# Patient Record
Sex: FEMALE | Race: WHITE | NOT HISPANIC OR LATINO | Employment: FULL TIME | URBAN - METROPOLITAN AREA
[De-identification: names, ages, dates, MRNs, and addresses within clinical notes are randomized per-mention and may not be internally consistent; named-entity substitution may affect disease eponyms.]

---

## 2020-06-12 ENCOUNTER — OFFICE VISIT (OUTPATIENT)
Dept: URGENT CARE | Facility: CLINIC | Age: 50
End: 2020-06-12
Payer: COMMERCIAL

## 2020-06-12 VITALS
HEART RATE: 63 BPM | OXYGEN SATURATION: 100 % | WEIGHT: 145 LBS | TEMPERATURE: 98 F | HEIGHT: 68 IN | DIASTOLIC BLOOD PRESSURE: 91 MMHG | BODY MASS INDEX: 21.98 KG/M2 | RESPIRATION RATE: 19 BRPM | SYSTOLIC BLOOD PRESSURE: 133 MMHG

## 2020-06-12 DIAGNOSIS — Z20.828 EXPOSURE TO VIRAL DISEASE: Primary | ICD-10-CM

## 2020-06-12 DIAGNOSIS — Z76.89 ENCOUNTER TO ESTABLISH CARE: ICD-10-CM

## 2020-06-12 PROCEDURE — 99201 PR OFFICE/OUTPT VISIT,NEW,LEVL I: ICD-10-PCS | Mod: S$GLB,,, | Performed by: NURSE PRACTITIONER

## 2020-06-12 PROCEDURE — 99201 PR OFFICE/OUTPT VISIT,NEW,LEVL I: CPT | Mod: S$GLB,,, | Performed by: NURSE PRACTITIONER

## 2020-06-12 PROCEDURE — U0003 INFECTIOUS AGENT DETECTION BY NUCLEIC ACID (DNA OR RNA); SEVERE ACUTE RESPIRATORY SYNDROME CORONAVIRUS 2 (SARS-COV-2) (CORONAVIRUS DISEASE [COVID-19]), AMPLIFIED PROBE TECHNIQUE, MAKING USE OF HIGH THROUGHPUT TECHNOLOGIES AS DESCRIBED BY CMS-2020-01-R: HCPCS

## 2020-06-12 RX ORDER — ESCITALOPRAM OXALATE 10 MG/1
TABLET ORAL
COMMUNITY
Start: 2020-05-28 | End: 2020-07-24 | Stop reason: SDUPTHER

## 2020-06-12 NOTE — PROGRESS NOTES
"Subjective:       Patient ID: Naima Jeffries is a 49 y.o. female.    Vitals:  height is 5' 7.5" (1.715 m) and weight is 65.8 kg (145 lb). Her temperature is 98.1 °F (36.7 °C). Her blood pressure is 133/91 (abnormal) and her pulse is 63. Her respiration is 19 and oxygen saturation is 100%.     Chief Complaint: COVID-19 Concerns    Pt states she was exposed on Wednesday by a friend. She denies having any symptoms. Pt saw them 5 days ago.    No fever or chills. No cough or SOB. No GI related symptoms, including, N/v/D or constipation. No anosmia or ageusia.    Patient requests referral to Family Provider to establish PCP.      Constitution: Negative for chills, fatigue and fever.   HENT: Negative for congestion and sore throat.    Neck: Negative for painful lymph nodes.   Cardiovascular: Negative for chest pain and leg swelling.   Eyes: Negative for double vision and blurred vision.   Respiratory: Negative for cough and shortness of breath.    Gastrointestinal: Negative for nausea, vomiting and diarrhea.   Genitourinary: Negative for dysuria, frequency, urgency and history of kidney stones.   Musculoskeletal: Negative for joint pain, joint swelling, muscle cramps and muscle ache.   Skin: Negative for color change, pale, rash and bruising.   Allergic/Immunologic: Negative for seasonal allergies.   Neurological: Negative for dizziness, history of vertigo, light-headedness, passing out and headaches.   Hematologic/Lymphatic: Negative for swollen lymph nodes.   Psychiatric/Behavioral: Negative for nervous/anxious, sleep disturbance and depression. The patient is not nervous/anxious.        Objective:      Physical Exam   Constitutional: She is oriented to person, place, and time. Vital signs are normal. No distress.   HENT:   Right Ear: Hearing normal.   Left Ear: Hearing normal.   Neck: Trachea normal and phonation normal.   Cardiovascular: Normal rate.   Pulmonary/Chest: Effort normal and breath sounds normal. She has no " decreased breath sounds. She has no wheezes.   No decreased breath sounds or audible wheezes on phone exam   Neurological: She is alert and oriented to person, place, and time. She is not disoriented. Gait normal.   Psychiatric: She has a normal mood and affect. Her speech is normal and behavior is normal. Judgment and thought content normal. Cognition and memory are normal.     Physical exam not performed due to potential for viral transmission during pandemic.  Patient seen telephonically.        Assessment:       1. Exposure to viral disease    2. Encounter to establish care        Plan:         Exposure to viral disease    Encounter to establish care  -     Ambulatory referral/consult to Family Practice      Patient Instructions   Instructions for Patients with Confirmed or Suspected COVID-19    If you are awaiting your test result, you will either be called or it will be released to the patient portal.  If you have any questions about your test, please visit [email or web address] or call our COVID-19 information line at [phone].      Preventing the Spread of Coronavirus Disease 2019 (COVID-19) in Homes and Residential Communities -- Patients     Prevention steps for people with confirmed or suspected COVID-19 (including persons under investigation) who do not need to be hospitalized and people with confirmed COVID-19 who were hospitalized and determined to be medically stable to go home.    Stay home except to get medical care.    Separate yourself from other people and animals in your home.    Call ahead before visiting your doctor.    Wear a face mask.    Cover your coughs and sneezes.    Clean your hands often.    Avoid sharing personal household items.    Clean all high-touch surfaces every day.    Monitor your symptoms. Seek prompt medical attention if your illness is worsening (e.g., difficulty breathing). Before seeking care, call your healthcare provider.    If you have a  medical emergency and must call 911, notify the dispatcher that you have or are being evaluated for COVID-19. If possible, put on a face mask before emergency medical services arrive.    Use the following symptom-based strategy to return to normal activity following a suspected or confirmed case of COVID-19. Continue isolation until:   o At least 3 days (72 hours) have passed since recovery defined as resolution of fever without the use of fever-reducing medications and improvement in respiratory symptoms (e.g. cough, shortness of breath), and   o At least 10 days have passed since symptoms first appeared.     Precautions for household members, intimate partners and caregivers in a non-healthcare setting of a patient with symptomatic laboratory-confirmed COVID-19 or a patient under investigation.     Household members, intimate partners and caregivers in a non-healthcare setting may have close contact with a person with symptomatic, laboratory-confirmed COVID-19 or a person under investigation. Close contacts should monitor their health; they should call their healthcare provider right away if they develop symptoms suggestive of COVID-19 (e.g., fever, cough, shortness of breath). Close contacts should also follow these recommendations:      Make sure that you understand and can help the patient follow their healthcare providers instructions for medication(s) and care. You should help the patient with basic needs in the home and provide support for getting groceries, prescriptions, and other personal needs.    Monitor the patients symptoms. If the patient is getting sicker, call his or her healthcare provider and tell them that the patient has laboratory-confirmed COVID-19. This will help the healthcare providers office take steps to keep people in the office or waiting room from getting infected. Ask the healthcare provider to call the local or [facility] health department for additional guidance. If the patient  has a medical emergency and you need to call 911, notify the dispatch personnel that the patient has or is being evaluated for COVID-19.    Household members should stay in another room or be  from the patient as much as possible. Household members should use a separate bedroom and bathroom, if available.    Prohibit visitors who do not have an essential need to be in the home.    Household members should care for any pets. Do not handle pets or other animals while sick.    Make sure that shared spaces in the home have good air flow, such as by an air conditioner.    Perform hand hygiene frequently. Wash your hands often with soap and water for at least 20 seconds or use an alcohol-based hand  that contains 60 to 95% alcohol, covering all surfaces of your hands and rubbing them together until they feel dry. Soap and water are preferred if hands are visibly dirty.    Avoid touching your eyes, nose and mouth with unwashed hands.    The patient should wear a face mask when you are around other people. If the patient is not able to wear a face mask (for example, because it causes trouble breathing), you, as the caregiver, should wear a mask when you are in the same room as the patient.    Wear a disposable face mask and gloves when you touch or have contact with the patients blood, stool or body fluids, such as saliva, sputum, nasal mucus, vomit and urine.   o Throw out disposable face masks and gloves after using them. Do not reuse.   o When removing personal protective equipment, first remove and dispose of gloves. Then, immediately clean your hands with soap and water or alcohol-based hand . Next, remove and dispose of face mask, and immediately clean your hands again with soap and water or alcohol-based hand .    Avoid sharing household items with the patient. You should not share dishes, drinking glasses, cups, eating utensils, towels, bedding or other items. After the  patient uses these items, you should wash them thoroughly (see below Wash laundry thoroughly).    Clean all high-touch surfaces, such as counters, tabletops, doorknobs, bathroom fixtures, toilets, phones, keyboards, tablets and bedside tables, every day. Also, clean any surfaces that may have blood, stool or body fluids on them.   o Use a household cleaning spray or wipe, according to the label instructions. Labels contain instructions for safe and effective use of the cleaning product including precautions you should take when applying the product, such as wearing gloves and making sure you have good ventilation during use of the product.    Wash laundry thoroughly.   o Immediately remove and wash clothes or bedding that have blood, stool or body fluids on them.  o Wear disposable gloves while handling soiled items and keep soiled items away from your body. Clean your hands (with soap and water or an alcohol-based hand ) immediately after removing your gloves.   o Read and follow directions on labels of laundry or clothing items and detergent. In general, using a normal laundry detergent according to washing machine instructions and dry thoroughly using the warmest temperatures recommended on the clothing label.    Place all used disposable gloves, face masks and other contaminated items in a lined container before disposing of them with other household waste. Clean your hands (with soap and water or an alcohol-based hand ) immediately after handling these items. Soap and water should be used preferentially if hands are visibly dirty.   Discuss any additional questions with your state or local health department

## 2020-06-12 NOTE — PATIENT INSTRUCTIONS
Instructions for Patients with Confirmed or Suspected COVID-19    If you are awaiting your test result, you will either be called or it will be released to the patient portal.  If you have any questions about your test, please visit [email or web address] or call our COVID-19 information line at [phone].      Preventing the Spread of Coronavirus Disease 2019 (COVID-19) in Homes and Residential Communities -- Patients     Prevention steps for people with confirmed or suspected COVID-19 (including persons under investigation) who do not need to be hospitalized and people with confirmed COVID-19 who were hospitalized and determined to be medically stable to go home.    Stay home except to get medical care.    Separate yourself from other people and animals in your home.    Call ahead before visiting your doctor.    Wear a face mask.    Cover your coughs and sneezes.    Clean your hands often.    Avoid sharing personal household items.    Clean all high-touch surfaces every day.    Monitor your symptoms. Seek prompt medical attention if your illness is worsening (e.g., difficulty breathing). Before seeking care, call your healthcare provider.    If you have a medical emergency and must call 911, notify the dispatcher that you have or are being evaluated for COVID-19. If possible, put on a face mask before emergency medical services arrive.    Use the following symptom-based strategy to return to normal activity following a suspected or confirmed case of COVID-19. Continue isolation until:   o At least 3 days (72 hours) have passed since recovery defined as resolution of fever without the use of fever-reducing medications and improvement in respiratory symptoms (e.g. cough, shortness of breath), and   o At least 10 days have passed since symptoms first appeared.     Precautions for household members, intimate partners and caregivers in a non-healthcare setting of a patient with symptomatic  laboratory-confirmed COVID-19 or a patient under investigation.     Household members, intimate partners and caregivers in a non-healthcare setting may have close contact with a person with symptomatic, laboratory-confirmed COVID-19 or a person under investigation. Close contacts should monitor their health; they should call their healthcare provider right away if they develop symptoms suggestive of COVID-19 (e.g., fever, cough, shortness of breath). Close contacts should also follow these recommendations:      Make sure that you understand and can help the patient follow their healthcare providers instructions for medication(s) and care. You should help the patient with basic needs in the home and provide support for getting groceries, prescriptions, and other personal needs.    Monitor the patients symptoms. If the patient is getting sicker, call his or her healthcare provider and tell them that the patient has laboratory-confirmed COVID-19. This will help the healthcare providers office take steps to keep people in the office or waiting room from getting infected. Ask the healthcare provider to call the local or [facility] health department for additional guidance. If the patient has a medical emergency and you need to call 911, notify the dispatch personnel that the patient has or is being evaluated for COVID-19.    Household members should stay in another room or be  from the patient as much as possible. Household members should use a separate bedroom and bathroom, if available.    Prohibit visitors who do not have an essential need to be in the home.    Household members should care for any pets. Do not handle pets or other animals while sick.    Make sure that shared spaces in the home have good air flow, such as by an air conditioner.    Perform hand hygiene frequently. Wash your hands often with soap and water for at least 20 seconds or use an alcohol-based hand  that contains 60 to  95% alcohol, covering all surfaces of your hands and rubbing them together until they feel dry. Soap and water are preferred if hands are visibly dirty.    Avoid touching your eyes, nose and mouth with unwashed hands.    The patient should wear a face mask when you are around other people. If the patient is not able to wear a face mask (for example, because it causes trouble breathing), you, as the caregiver, should wear a mask when you are in the same room as the patient.    Wear a disposable face mask and gloves when you touch or have contact with the patients blood, stool or body fluids, such as saliva, sputum, nasal mucus, vomit and urine.   o Throw out disposable face masks and gloves after using them. Do not reuse.   o When removing personal protective equipment, first remove and dispose of gloves. Then, immediately clean your hands with soap and water or alcohol-based hand . Next, remove and dispose of face mask, and immediately clean your hands again with soap and water or alcohol-based hand .    Avoid sharing household items with the patient. You should not share dishes, drinking glasses, cups, eating utensils, towels, bedding or other items. After the patient uses these items, you should wash them thoroughly (see below Wash laundry thoroughly).    Clean all high-touch surfaces, such as counters, tabletops, doorknobs, bathroom fixtures, toilets, phones, keyboards, tablets and bedside tables, every day. Also, clean any surfaces that may have blood, stool or body fluids on them.   o Use a household cleaning spray or wipe, according to the label instructions. Labels contain instructions for safe and effective use of the cleaning product including precautions you should take when applying the product, such as wearing gloves and making sure you have good ventilation during use of the product.    Wash laundry thoroughly.   o Immediately remove and wash clothes or bedding that have  blood, stool or body fluids on them.  o Wear disposable gloves while handling soiled items and keep soiled items away from your body. Clean your hands (with soap and water or an alcohol-based hand ) immediately after removing your gloves.   o Read and follow directions on labels of laundry or clothing items and detergent. In general, using a normal laundry detergent according to washing machine instructions and dry thoroughly using the warmest temperatures recommended on the clothing label.    Place all used disposable gloves, face masks and other contaminated items in a lined container before disposing of them with other household waste. Clean your hands (with soap and water or an alcohol-based hand ) immediately after handling these items. Soap and water should be used preferentially if hands are visibly dirty.   Discuss any additional questions with your state or local health department

## 2020-06-13 ENCOUNTER — TELEPHONE (OUTPATIENT)
Dept: URGENT CARE | Facility: CLINIC | Age: 50
End: 2020-06-13

## 2020-06-13 ENCOUNTER — NURSE TRIAGE (OUTPATIENT)
Dept: ADMINISTRATIVE | Facility: CLINIC | Age: 50
End: 2020-06-13

## 2020-06-13 LAB — SARS-COV-2 RNA RESP QL NAA+PROBE: NOT DETECTED

## 2020-06-13 NOTE — TELEPHONE ENCOUNTER
Pt states she was exposed, but tested negative for covid about a week ago.  Per protocol, advice given.  Pt stated understanding,    Reason for Disposition   [1] COVID-19 exposure AND [2] NO symptoms   [1] COVID-19 EXPOSURE (Close Contact) AND [2] within last 14 days BUT [3] NO symptoms    Additional Information   Negative: SEVERE difficulty breathing (e.g., struggling for each breath, speaks in single words)   Negative: Difficult to awaken or acting confused (e.g., disoriented, slurred speech)   Negative: Bluish (or gray) lips or face now   Negative: Shock suspected (e.g., cold/pale/clammy skin, too weak to stand, low BP, rapid pulse)   Negative: Sounds like a life-threatening emergency to the triager   Negative: COVID-19 has been diagnosed by a healthcare provider (HCP)   Negative: COVID-19 lab test positive   Negative: [1] Symptoms of COVID-19 (e.g., cough, fever, SOB, or others) AND [2] lives in an area with community spread   Negative: [1] Symptoms of COVID-19 (e.g., cough, fever, SOB, or others) AND [2] within 14 days of EXPOSURE (close contact) with diagnosed or suspected COVID-19 patient   Negative: [1] Symptoms of COVID-19 (e.g., cough, fever, SOB, or others) AND [2] within 14 days of travel from high-risk area for COVID-19 community spread (identified by CDC)   Negative: [1] Difficulty breathing (shortness of breath) occurs AND [2] onset > 14 days after COVID-19 EXPOSURE (Close Contact) AND [3] no community spread where patient lives   Negative: [1] Dry cough occurs AND [2] onset > 14 days after COVID-19 EXPOSURE AND [3] no community spread where patient lives   Negative: [1] Wet cough (i.e., white-yellow, yellow, green, or janey colored sputum) AND [2] onset > 14 days after COVID-19 EXPOSURE AND [3] no community spread where patient lives   Negative: [1] Common cold symptoms AND [2] onset > 14 days after COVID-19 EXPOSURE AND [3] no community spread where patient lives   Negative: [1]  COVID-19 EXPOSURE (Close Contact) within last 14 days AND [2] needs COVID-19 lab test to return to work AND [3] NO symptoms   Negative: [1] COVID-19 EXPOSURE (Close Contact) within last 14 days AND [2] 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

## 2020-06-14 ENCOUNTER — TELEPHONE (OUTPATIENT)
Dept: URGENT CARE | Facility: CLINIC | Age: 50
End: 2020-06-14

## 2020-06-18 ENCOUNTER — TELEPHONE (OUTPATIENT)
Dept: INTERNAL MEDICINE | Facility: CLINIC | Age: 50
End: 2020-06-18

## 2020-06-18 NOTE — TELEPHONE ENCOUNTER
----- Message from Naima Wang sent at 6/18/2020 12:24 PM CDT -----  Regarding: Encounter to establish care    Pt has a referral for a PCP. Pt is requesting Dr Florian as her PCP. Pt's mom Darcy Jeffries and uncle Cesario Douglas both see Dr Florian. Pt can be reached [phone]. Thanks

## 2020-07-24 ENCOUNTER — OFFICE VISIT (OUTPATIENT)
Dept: INTERNAL MEDICINE | Facility: CLINIC | Age: 50
End: 2020-07-24
Payer: COMMERCIAL

## 2020-07-24 VITALS
SYSTOLIC BLOOD PRESSURE: 118 MMHG | HEART RATE: 62 BPM | HEIGHT: 68 IN | OXYGEN SATURATION: 99 % | BODY MASS INDEX: 23.36 KG/M2 | WEIGHT: 154.13 LBS | DIASTOLIC BLOOD PRESSURE: 80 MMHG

## 2020-07-24 DIAGNOSIS — Z00.00 WELLNESS EXAMINATION: Primary | ICD-10-CM

## 2020-07-24 DIAGNOSIS — Z97.5 IUD (INTRAUTERINE DEVICE) IN PLACE: ICD-10-CM

## 2020-07-24 DIAGNOSIS — F43.21 SITUATIONAL DEPRESSION: ICD-10-CM

## 2020-07-24 DIAGNOSIS — R92.8 MAMMOGRAM ABNORMAL: ICD-10-CM

## 2020-07-24 PROCEDURE — 99396 PREV VISIT EST AGE 40-64: CPT | Mod: S$GLB,,, | Performed by: INTERNAL MEDICINE

## 2020-07-24 PROCEDURE — 99999 PR PBB SHADOW E&M-EST. PATIENT-LVL V: ICD-10-PCS | Mod: PBBFAC,,, | Performed by: INTERNAL MEDICINE

## 2020-07-24 PROCEDURE — 99999 PR PBB SHADOW E&M-EST. PATIENT-LVL V: CPT | Mod: PBBFAC,,, | Performed by: INTERNAL MEDICINE

## 2020-07-24 PROCEDURE — 99396 PR PREVENTIVE VISIT,EST,40-64: ICD-10-PCS | Mod: S$GLB,,, | Performed by: INTERNAL MEDICINE

## 2020-07-24 RX ORDER — ESCITALOPRAM OXALATE 10 MG/1
10 TABLET ORAL DAILY
Qty: 90 TABLET | Refills: 1 | Status: SHIPPED | OUTPATIENT
Start: 2020-07-24 | End: 2021-07-28 | Stop reason: SDUPTHER

## 2020-07-24 RX ORDER — MELOXICAM 15 MG/1
15 TABLET ORAL DAILY
Qty: 20 TABLET | Status: SHIPPED | OUTPATIENT
Start: 2020-07-24 | End: 2021-11-05 | Stop reason: SDUPTHER

## 2020-07-24 NOTE — PROGRESS NOTES
Subjective:       Patient ID: Naima Jeffries is a 49 y.o. female.    Chief Complaint: Establish Care    [place] is new to me.  She is moving here.  I care for her mother and uncle.  SHe just went thru a divorce and her mother had a significant stroke.  She is concerned about her own stroke risk.  Reports a possible abn MMG in the past but no biopsy needed.  No CP or SOB.    Review of Systems   Respiratory: Negative for shortness of breath (PND or orthopnea).    Cardiovascular: Negative for chest pain (arm pain or jaw pain).   Gastrointestinal: Negative for abdominal pain, diarrhea, nausea and vomiting.   Genitourinary: Negative for dysuria.   Neurological: Negative for seizures, syncope and headaches.       Objective:      Physical Exam  Constitutional:       General: She is not in acute distress.     Appearance: She is well-developed.   HENT:      Head: Normocephalic.   Eyes:      Pupils: Pupils are equal, round, and reactive to light.   Neck:      Musculoskeletal: Neck supple.      Thyroid: No thyromegaly.      Vascular: No JVD.   Cardiovascular:      Rate and Rhythm: Normal rate and regular rhythm.      Heart sounds: Normal heart sounds. No murmur. No friction rub. No gallop.    Pulmonary:      Effort: Pulmonary effort is normal.      Breath sounds: Normal breath sounds. No wheezing or rales.   Abdominal:      General: Bowel sounds are normal. There is no distension.      Palpations: Abdomen is soft. There is no mass.      Tenderness: There is no abdominal tenderness. There is no guarding or rebound.   Lymphadenopathy:      Cervical: No cervical adenopathy.   Skin:     General: Skin is warm and dry.   Neurological:      Mental Status: She is alert and oriented to person, place, and time.      Deep Tendon Reflexes: Reflexes are normal and symmetric.   Psychiatric:         Behavior: Behavior normal.         Thought Content: Thought content normal.         Judgment: Judgment normal.         Assessment:       1. Wellness  examination    2. Mammogram abnormal    3. Situational depression    4. IUD (intrauterine device) in place        Plan:   Wellness examination  -     CBC auto differential; Future; Expected date: 07/24/2020  -     Comprehensive metabolic panel; Future; Expected date: 07/24/2020  -     Lipid Panel; Future; Expected date: 07/24/2020  -     TSH; Future; Expected date: 07/24/2020  -     Hemoglobin A1C; Future; Expected date: 07/24/2020  -     Vitamin D; Future; Expected date: 07/24/2020  -     Ambulatory referral/consult to Gynecology; Future; Expected date: 07/31/2020  -     Ambulatory referral/consult to Dermatology; Future; Expected date: 07/31/2020  -     Ambulatory referral/consult to Plastic Surgery; Future; Expected date: 07/31/2020  -     Ambulatory referral/consult to Optometry; Future; Expected date: 07/31/2020    Mammogram abnormal  -     Mammo Digital Diagnostic Bilateral With CAD; Future; Expected date: 07/24/2020    Situational depression  Controlled - continue current meds    IUD (intrauterine device) in place    Other orders  -     escitalopram oxalate (LEXAPRO) 10 MG tablet; Take 1 tablet (10 mg total) by mouth once daily.  Dispense: 90 tablet; Refill: 1  -     levonorgestreL (MIRENA) 20 mcg/24 hours (5 yrs) 52 mg IUD; 1 Intra Uterine Device by Intrauterine route once. for 1 dose  Dispense: 1 Intra Uterine Device; Refill: 0  -     meloxicam (MOBIC) 15 MG tablet; Take 1 tablet (15 mg total) by mouth once daily. (Patient not taking: Reported on 8/10/2020)  Dispense: 20 tablet; Refill: -    Needs to see GYN because of Mirena    Both mother and uncle have Atrial fibrillation - advised that stroke was due to that and risk of Afib increase over time not genetic - we will need to watch BP and cholesterol - she was reassured    Get records

## 2020-08-10 ENCOUNTER — OFFICE VISIT (OUTPATIENT)
Dept: OBSTETRICS AND GYNECOLOGY | Facility: CLINIC | Age: 50
End: 2020-08-10
Payer: COMMERCIAL

## 2020-08-10 VITALS — WEIGHT: 158.5 LBS | SYSTOLIC BLOOD PRESSURE: 112 MMHG | BODY MASS INDEX: 24.46 KG/M2 | DIASTOLIC BLOOD PRESSURE: 68 MMHG

## 2020-08-10 DIAGNOSIS — Z12.4 SCREENING FOR MALIGNANT NEOPLASM OF CERVIX: ICD-10-CM

## 2020-08-10 DIAGNOSIS — B00.1 COLD SORE: ICD-10-CM

## 2020-08-10 DIAGNOSIS — Z00.00 WELLNESS EXAMINATION: Primary | ICD-10-CM

## 2020-08-10 DIAGNOSIS — Z12.39 SCREENING FOR MALIGNANT NEOPLASM OF BREAST: ICD-10-CM

## 2020-08-10 DIAGNOSIS — Z12.11 SCREENING FOR MALIGNANT NEOPLASM OF COLON: ICD-10-CM

## 2020-08-10 PROCEDURE — 99386 PREV VISIT NEW AGE 40-64: CPT | Mod: 25,S$GLB,, | Performed by: NURSE PRACTITIONER

## 2020-08-10 PROCEDURE — 99999 PR PBB SHADOW E&M-EST. PATIENT-LVL III: ICD-10-PCS | Mod: PBBFAC,,, | Performed by: NURSE PRACTITIONER

## 2020-08-10 PROCEDURE — 87624 HPV HI-RISK TYP POOLED RSLT: CPT

## 2020-08-10 PROCEDURE — 88141 PR  CYTOPATH CERV/VAG INTERPRET: ICD-10-PCS | Mod: ,,, | Performed by: PATHOLOGY

## 2020-08-10 PROCEDURE — 99386 PR PREVENTIVE VISIT,NEW,40-64: ICD-10-PCS | Mod: 25,S$GLB,, | Performed by: NURSE PRACTITIONER

## 2020-08-10 PROCEDURE — 58301 REMOVE INTRAUTERINE DEVICE: CPT | Mod: S$GLB,,, | Performed by: NURSE PRACTITIONER

## 2020-08-10 PROCEDURE — 58301 PR REMOVE, INTRAUTERINE DEVICE: ICD-10-PCS | Mod: S$GLB,,, | Performed by: NURSE PRACTITIONER

## 2020-08-10 PROCEDURE — 88141 CYTOPATH C/V INTERPRET: CPT | Mod: ,,, | Performed by: PATHOLOGY

## 2020-08-10 PROCEDURE — 99999 PR PBB SHADOW E&M-EST. PATIENT-LVL III: CPT | Mod: PBBFAC,,, | Performed by: NURSE PRACTITIONER

## 2020-08-10 PROCEDURE — 88175 CYTOPATH C/V AUTO FLUID REDO: CPT | Performed by: PATHOLOGY

## 2020-08-10 RX ORDER — VALACYCLOVIR HYDROCHLORIDE 500 MG/1
500 TABLET, FILM COATED ORAL 2 TIMES DAILY
Qty: 6 TABLET | Refills: 0 | Status: SHIPPED | OUTPATIENT
Start: 2020-08-10 | End: 2021-11-05

## 2020-08-10 NOTE — PROCEDURES
Procedures     PROCEDURE:     PRE-IUD REMOVAL COUNSELING:  The patient was advised of minimal risks of bleeding and pain and she agrees to proceed.    PROCEDURE:  TIME OUT PERFORMED.  IUD strings were visualized at the os and grasped. IUD removed with gentle traction.  The patient tolerated the procedure well      POST IUD REMOVAL COUNSELING:  Expect period-like flow to occur after Mirena IUD removal and periods to return to pre-IUD pattern.  Manage post IUD removal cramping with NSAIDs, Tylenol or Rx per [employer].    POST IUD REMOVAL CONTRACEPTION: Nothing    Counseling lasted approximately 15 minutes and all her questions were answered.    FOLLOW-UP: With me for annual gyn exam or prn.

## 2020-08-10 NOTE — LETTER
August 10, 2020      Isela Florian MD  [address]  [facility] [address]           [facility] - Obstetrics and Gynecology  [address]  [facility] [address]  Phone: [phone]  Fax: [phone]          Patient: Naima Jeffries   MR Number: [id number]   YOB: 1970   Date of Visit: 8/10/2020       Dear Dr. Isela Florian:    Thank you for referring Naima Jeffries to me for evaluation. Attached you will find relevant portions of my assessment and plan of care.    If you have questions, please do not hesitate to call me. I look forward to following Naima Jeffries along with you.    Sincerely,    Anne Gonzalez, NP    Enclosure  CC:  No Recipients    If you would like to receive this communication electronically, please contact [email or web address] or [phone] to request more information on [employer] Link access.    For providers and/or their staff who would like to refer a patient to [facility], please contact us through our one-stop-shop provider referral line, [facility], at [phone].    If you feel you have received this communication in error or would no longer like to receive these types of communications, please e-mail [email or web address]

## 2020-08-10 NOTE — PROGRESS NOTES
CC: Well woman exam    SUBJECTIVE:   Naima Jeffries is a 49 y.o. female   for annual routine Pap and checkup. Patient's last menstrual period was 2020..  She has no unusual complaints.      Pt reports h/o of a cold sore and thinks she is having a flare up. Requests valtrex.     She describes her periods as regular with normal flow.  denies break through bleeding.   denies vaginal itching or irritation.  denies vaginal discharge.    She is not currently sexually active. Recently went through a divorce.   She uses IUD for contraception. Mirena was placed in .     History of abnormal pap: Yes - in , pt unsure of the abnormality. None since.   Last Pap: Pt cannot remember  Last MMG: Yes - 1 year ago  Last Colonoscopy:  No    Past Medical History:   Diagnosis Date    S/P D&C (status post dilation and curettage)        Past Surgical History:   Procedure Laterality Date    EXCISION OF PTERYGIUM Right        OB History    Para Term  AB Living   4       2 2   SAB TAB Ectopic Multiple Live Births                  # Outcome Date GA Lbr Solomon/2nd Weight Sex Delivery Anes PTL Lv   4             3             2 AB            1 AB                Family History   Problem Relation Age of Onset    Stroke Mother     Breast cancer Paternal Aunt     Colon cancer Neg Hx     Ovarian cancer Neg Hx        Social History     Tobacco Use    Smoking status: Never Smoker    Smokeless tobacco: Never Used   Substance Use Topics    Alcohol use: Yes     Comment: socially    Drug use: Never       /68   Wt 71.9 kg (158 lb 8.2 oz)   LMP 2020   BMI 24.46 kg/m²     ROS:  GENERAL: Denies weight gain or weight loss. Feeling well overall.   SKIN: Denies rash or lesions.   HEAD: Denies head injury or headache.   NODES: Denies enlarged lymph nodes.   CHEST: Denies chest pain or shortness of breath.   CARDIOVASCULAR: Denies palpitations or left sided chest pain.   ABDOMEN: No abdominal  pain, constipation, diarrhea, nausea, vomiting or rectal bleeding.   URINARY: No frequency, dysuria, hematuria, or burning on urination.  REPRODUCTIVE: See HPI.   BREASTS: The patient performs breast self-examination and denies pain, lumps, or nipple discharge.   HEMATOLOGIC: No easy bruisability or excessive bleeding.  MUSCULOSKELETAL: Denies joint pain or swelling.   NEUROLOGIC: Denies syncope or weakness.   PSYCHIATRIC: Denies depression, anxiety or mood swings.    Physical Exam:  APPEARANCE: Well nourished, well developed, in no acute distress.  AFFECT: WNL, alert and oriented x 3  SKIN: No acne or hirsutism  NECK: Neck symmetric without masses or thyromegaly  NODES: No inguinal, cervical, axillary, or femoral lymph node enlargement  CHEST: Good respiratory effect  ABDOMEN: Soft.  No tenderness or masses.  No hepatosplenomegaly.  No hernias.  BREASTS: Symmetrical, no skin changes or visible lesions.  No palpable masses, nipple discharge bilaterally.  PELVIC: Normal external genitalia without lesions.  Normal hair distribution.  Adequate perineal body, normal urethral meatus.  Vagina moist and well rugated without lesions or discharge.  Cervix pink, without lesions, discharge or tenderness.  IUD strings visualized extending about 3 cm from cervical os. Iud removed- see procedure note.   No significant cystocele or rectocele.  Bimanual exam shows uterus to be normal size, regular, mobile and nontender.  Adnexa without masses or tenderness.    EXTREMITIES: No edema.    ASSESSMENT AND PLAN  1. Wellness examination  Ambulatory referral/consult to Gynecology   2. Cold sore  valACYclovir (VALTREX) 500 MG tablet   3. Screening for malignant neoplasm of cervix  Liquid-Based Pap Smear, Screening    HPV High Risk Genotypes, PCR   4. Screening for malignant neoplasm of breast  Mammo Digital Screening Bilat w/ Eder   5. Screening for malignant neoplasm of colon  Case request GI: COLONOSCOPY       -Patient was counseled today  on diet, exercise and A.C.S. Pap guidelines and recommendations for yearly pelvic exams, mammograms and monthly self breast exams; to see her PCP for other health maintenance.  -Pap with HPV co-testing today. If NILM HPV - repeat in 5 years per current ASCCP guidelines.   -Screening mmg ordered  -IUD removed as it is . Pt does not wish to use any type of birth control at this time.   -Rx for valtrex sent  -Colonoscopy ordered as pt will be 50 in a few days      F/u 1 year or PRN      KRISTIE Degroot      Answers for HPI/ROS submitted by the patient on 8/10/2020   Gynecologic exam  genital itching: No  genital lesions: No  genital odor: No  genital rash: No  missed menses: No  pelvic pain: Yes  vaginal bleeding: No  vaginal discharge: No  Chronicity: new  Onset: in the past 7 days  Frequency: rarely  Progression since onset: gradually improving  Pain severity: mild  Pregnant now?: No  abdominal pain: Yes  anorexia: No  back pain: Yes  chills: No  constipation: No  diarrhea: No  discolored urine: No  dysuria: No  fever: No  flank pain: Yes  frequency: No  headaches: No  hematuria: No  nausea: No  painful intercourse: No  rash: No  urgency: No  vomiting: No  Please select the characteristics of your discharge: : normal  Vaginal bleeding: no bleeding  Passing clots?: No  Passing tissue?: No  Aggravated by: tactile pressure  treatments tried: nothing  Sexual activity: sexually active  Partner with STD symptoms: no  Birth control: an IUD  Menstrual history: irregular  STD: No  abdominal surgery: No   section: No  Ectopic pregnancy: No  Endometriosis: No  herpes simplex: No  gynecological surgery: Yes  menorrhagia: No  metrorrhagia: No  miscarriage: Yes  ovarian cysts: No  perineal abscess: No  PID: No  terminated pregnancy: No  vaginosis: No

## 2020-08-14 ENCOUNTER — OFFICE VISIT (OUTPATIENT)
Dept: DERMATOLOGY | Facility: CLINIC | Age: 50
End: 2020-08-14
Payer: COMMERCIAL

## 2020-08-14 DIAGNOSIS — L81.4 LENTIGO: ICD-10-CM

## 2020-08-14 DIAGNOSIS — L53.9 ERYTHEMA: ICD-10-CM

## 2020-08-14 DIAGNOSIS — L80 VITILIGO: ICD-10-CM

## 2020-08-14 DIAGNOSIS — L24.9 IRRITANT CONTACT DERMATITIS, UNSPECIFIED TRIGGER: ICD-10-CM

## 2020-08-14 DIAGNOSIS — L82.1 SK (SEBORRHEIC KERATOSIS): Primary | ICD-10-CM

## 2020-08-14 DIAGNOSIS — Z12.83 SCREENING EXAM FOR SKIN CANCER: ICD-10-CM

## 2020-08-14 LAB
HPV HR 12 DNA SPEC QL NAA+PROBE: NEGATIVE
HPV16 AG SPEC QL: NEGATIVE
HPV18 DNA SPEC QL NAA+PROBE: NEGATIVE

## 2020-08-14 PROCEDURE — 99999 PR PBB SHADOW E&M-EST. PATIENT-LVL III: CPT | Mod: PBBFAC,,, | Performed by: DERMATOLOGY

## 2020-08-14 PROCEDURE — 99999 PR PBB SHADOW E&M-EST. PATIENT-LVL III: ICD-10-PCS | Mod: PBBFAC,,, | Performed by: DERMATOLOGY

## 2020-08-14 PROCEDURE — 99203 OFFICE O/P NEW LOW 30 MIN: CPT | Mod: S$GLB,,, | Performed by: DERMATOLOGY

## 2020-08-14 PROCEDURE — 99203 PR OFFICE/OUTPT VISIT, NEW, LEVL III, 30-44 MIN: ICD-10-PCS | Mod: S$GLB,,, | Performed by: DERMATOLOGY

## 2020-08-14 RX ORDER — ALCLOMETASONE DIPROPIONATE 0.5 MG/G
OINTMENT TOPICAL
Qty: 30 G | Refills: 1 | Status: SHIPPED | OUTPATIENT
Start: 2020-08-14 | End: 2021-12-07

## 2020-08-14 NOTE — PROGRESS NOTES
Subjective:       Patient ID:  Naima Jeffries is a 49 y.o. 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   Hematologic/Lymphatic: Does not bruise/bleed easily.        Objective:    Physical Exam   Constitutional: She appears well-developed and well-nourished. No distress.   Neurological: She is alert and oriented to person, place, and time. She is not disoriented.   Psychiatric: She has a normal mood and affect.   Skin:   Areas Examined (abnormalities noted in diagram):   Scalp / Hair Palpated and Inspected  Head / Face Inspection Performed  Neck Inspection Performed  Chest / Axilla Inspection Performed  Abdomen Inspection Performed  Genitals / Buttocks / Groin Inspection Performed  Back Inspection Performed  RUE Inspected  LUE Inspection Performed  RLE Inspected  LLE Inspection Performed  Nails and Digits Inspection Performed                       Diagram Legend     Erythematous scaling macule/papule c/w actinic keratosis       Vascular papule c/w angioma      Pigmented verrucoid papule/plaque c/w seborrheic keratosis      Yellow umbilicated papule c/w sebaceous hyperplasia      Irregularly shaped tan macule c/w lentigo     1-2 mm smooth white papules consistent with Milia      Movable subcutaneous cyst with punctum c/w epidermal inclusion cyst      Subcutaneous movable cyst c/w pilar cyst      Firm pink to brown papule c/w dermatofibroma       Pedunculated fleshy papule(s) c/w skin tag(s)      Evenly pigmented macule c/w junctional nevus     Mildly variegated pigmented, slightly irregular-bordered macule c/w mildly atypical nevus      Flesh colored to evenly pigmented papule c/w intradermal nevus       Pink pearly papule/plaque c/w basal cell carcinoma      Erythematous hyperkeratotic cursted plaque c/w SCC      Surgical scar with no sign of skin cancer recurrence      Open and closed comedones      Inflammatory papules and pustules      Verrucoid papule consistent consistent with wart     Erythematous eczematous patches and plaques     Dystrophic onycholytic nail with subungual debris c/w onychomycosis     Umbilicated papule    Erythematous-base heme-crusted tan verrucoid plaque consistent with inflamed seborrheic keratosis     Erythematous Silvery Scaling Plaque c/w Psoriasis     See annotation      Assessment / Plan:        SK (seborrheic keratosis)  -     Ambulatory referral/consult to Dermatology  These are benign inherited growths without a malignant potential. Reassurance given to patient. No treatment is necessary.       Lentigo  This is a benign hyperpigmented sun induced lesion. Daily sun protection will reduce the number of new lesions. Treatment of these benign lesions are considered cosmetic.      Erythema 2/2 pressure from glasses  Need to have glasses readjusted    Irritant contact dermatitis, unspecified trigger  -     alclomethasone (ACLOVATE) 0.05 % ointment; AAA lip bid  Dispense: 30 g; Refill: 1  It is recommended that patient discontinue all lip balms and use only Vaseline petroleum jelly applied frequently to lips.    Screening exam for skin cancer    Total body skin examination performed today including at least 12 points as noted in physical examination. No lesions suspicious for malignancy noted.      Vitiligo  Defer               Follow up if symptoms worsen or fail to improve.

## 2020-08-14 NOTE — PATIENT INSTRUCTIONS
It is recommended that patient discontinue all lip balms and use only Vaseline petroleum jelly applied frequently to lips.

## 2020-08-14 NOTE — LETTER
August 14, 2020      Isela Florian MD  [address]  [facility] [address]           [facility] - Dermatology [address]  [address]  [facility] [address]  Phone: [phone]  Fax: [phone]          Patient: Naima Jeffries   MR Number: [id number]   YOB: 1970   Date of Visit: 8/14/2020       Dear Dr. Isela Florian:    Thank you for referring Naima Jeffries to me for evaluation. Attached you will find relevant portions of my assessment and plan of care.    If you have questions, please do not hesitate to call me. I look forward to following Naima Jeffries along with you.    Sincerely,    Nhi Dang MD    Enclosure  CC:  No Recipients    If you would like to receive this communication electronically, please contact [email or web address] or [phone] to request more information on [employer] Link access.    For providers and/or their staff who would like to refer a patient to [facility], please contact us through our one-stop-shop provider referral line, [facility], at [phone].    If you feel you have received this communication in error or would no longer like to receive these types of communications, please e-mail [email or web address]

## 2020-08-24 LAB
FINAL PATHOLOGIC DIAGNOSIS: NORMAL
Lab: NORMAL

## 2020-08-25 ENCOUNTER — HOSPITAL ENCOUNTER (OUTPATIENT)
Dept: RADIOLOGY | Facility: HOSPITAL | Age: 50
Discharge: HOME | End: 2020-08-25
Attending: NURSE PRACTITIONER
Payer: COMMERCIAL

## 2020-08-25 DIAGNOSIS — Z12.39 SCREENING FOR MALIGNANT NEOPLASM OF BREAST: ICD-10-CM

## 2020-08-25 PROCEDURE — 77067 SCR MAMMO BI INCL CAD: CPT | Mod: TC,PN

## 2020-08-25 PROCEDURE — 77067 MAMMO DIGITAL SCREENING BILAT WITH TOMOSYNTHESIS_CAD: ICD-10-PCS | Mod: 26,,, | Performed by: RADIOLOGY

## 2020-08-25 PROCEDURE — 77063 MAMMO DIGITAL SCREENING BILAT WITH TOMOSYNTHESIS_CAD: ICD-10-PCS | Mod: 26,,, | Performed by: RADIOLOGY

## 2020-08-25 PROCEDURE — 77067 SCR MAMMO BI INCL CAD: CPT | Mod: 26,,, | Performed by: RADIOLOGY

## 2020-08-25 PROCEDURE — 77063 BREAST TOMOSYNTHESIS BI: CPT | Mod: 26,,, | Performed by: RADIOLOGY

## 2020-09-02 ENCOUNTER — TELEPHONE (OUTPATIENT)
Dept: RADIOLOGY | Facility: HOSPITAL | Age: 50
End: 2020-09-02

## 2020-09-03 ENCOUNTER — TELEPHONE (OUTPATIENT)
Dept: RADIOLOGY | Facility: HOSPITAL | Age: 50
End: 2020-09-03

## 2020-09-03 NOTE — TELEPHONE ENCOUNTER
Spoke with patient and explained mammogram findings.Patient expressed understanding of results. Patient scheduled abnormal mammogram follow up appointment at [facility] on 9/16/2020.

## 2020-09-08 ENCOUNTER — LAB VISIT (OUTPATIENT)
Dept: LAB | Facility: HOSPITAL | Age: 50
End: 2020-09-08
Attending: INTERNAL MEDICINE
Payer: COMMERCIAL

## 2020-09-08 DIAGNOSIS — Z00.00 WELLNESS EXAMINATION: ICD-10-CM

## 2020-09-08 LAB
25(OH)D3+25(OH)D2 SERPL-MCNC: 36 NG/ML (ref 30–96)
ALBUMIN SERPL BCP-MCNC: 4.6 G/DL (ref 3.5–5.2)
ALP SERPL-CCNC: 38 U/L (ref 55–135)
ALT SERPL W/O P-5'-P-CCNC: 16 U/L (ref 10–44)
ANION GAP SERPL CALC-SCNC: 9 MMOL/L (ref 8–16)
AST SERPL-CCNC: 22 U/L (ref 10–40)
BASOPHILS # BLD AUTO: 0.03 K/UL (ref 0–0.2)
BASOPHILS NFR BLD: 0.7 % (ref 0–1.9)
BILIRUB SERPL-MCNC: 0.5 MG/DL (ref 0.1–1)
BUN SERPL-MCNC: 13 MG/DL (ref 6–20)
CALCIUM SERPL-MCNC: 9.7 MG/DL (ref 8.7–10.5)
CHLORIDE SERPL-SCNC: 102 MMOL/L (ref 95–110)
CHOLEST SERPL-MCNC: 216 MG/DL (ref 120–199)
CHOLEST/HDLC SERPL: 2.1 {RATIO} (ref 2–5)
CO2 SERPL-SCNC: 28 MMOL/L (ref 23–29)
CREAT SERPL-MCNC: 0.9 MG/DL (ref 0.5–1.4)
DIFFERENTIAL METHOD: NORMAL
EOSINOPHIL # BLD AUTO: 0.1 K/UL (ref 0–0.5)
EOSINOPHIL NFR BLD: 2.5 % (ref 0–8)
ERYTHROCYTE [DISTWIDTH] IN BLOOD BY AUTOMATED COUNT: 13.1 % (ref 11.5–14.5)
EST. GFR  (AFRICAN AMERICAN): >60 ML/MIN/1.73 M^2
EST. GFR  (NON AFRICAN AMERICAN): >60 ML/MIN/1.73 M^2
ESTIMATED AVG GLUCOSE: 100 MG/DL (ref 68–131)
GLUCOSE SERPL-MCNC: 93 MG/DL (ref 70–110)
HBA1C MFR BLD HPLC: 5.1 % (ref 4–5.6)
HCT VFR BLD AUTO: 43.9 % (ref 37–48.5)
HDLC SERPL-MCNC: 104 MG/DL (ref 40–75)
HDLC SERPL: 48.1 % (ref 20–50)
HGB BLD-MCNC: 14.1 G/DL (ref 12–16)
IMM GRANULOCYTES # BLD AUTO: 0.01 K/UL (ref 0–0.04)
IMM GRANULOCYTES NFR BLD AUTO: 0.2 % (ref 0–0.5)
LDLC SERPL CALC-MCNC: 100.2 MG/DL (ref 63–159)
LYMPHOCYTES # BLD AUTO: 1.2 K/UL (ref 1–4.8)
LYMPHOCYTES NFR BLD: 26.7 % (ref 18–48)
MCH RBC QN AUTO: 30.2 PG (ref 27–31)
MCHC RBC AUTO-ENTMCNC: 32.1 G/DL (ref 32–36)
MCV RBC AUTO: 94 FL (ref 82–98)
MONOCYTES # BLD AUTO: 0.4 K/UL (ref 0.3–1)
MONOCYTES NFR BLD: 8.4 % (ref 4–15)
NEUTROPHILS # BLD AUTO: 2.7 K/UL (ref 1.8–7.7)
NEUTROPHILS NFR BLD: 61.5 % (ref 38–73)
NONHDLC SERPL-MCNC: 112 MG/DL
NRBC BLD-RTO: 0 /100 WBC
PLATELET # BLD AUTO: 203 K/UL (ref 150–350)
PMV BLD AUTO: 11.6 FL (ref 9.2–12.9)
POTASSIUM SERPL-SCNC: 4.5 MMOL/L (ref 3.5–5.1)
PROT SERPL-MCNC: 7.9 G/DL (ref 6–8.4)
RBC # BLD AUTO: 4.67 M/UL (ref 4–5.4)
SODIUM SERPL-SCNC: 139 MMOL/L (ref 136–145)
TRIGL SERPL-MCNC: 59 MG/DL (ref 30–150)
TSH SERPL DL<=0.005 MIU/L-ACNC: 2.03 UIU/ML (ref 0.4–4)
WBC # BLD AUTO: 4.39 K/UL (ref 3.9–12.7)

## 2020-09-08 PROCEDURE — 85025 COMPLETE CBC W/AUTO DIFF WBC: CPT

## 2020-09-08 PROCEDURE — 82306 VITAMIN D 25 HYDROXY: CPT

## 2020-09-08 PROCEDURE — 80053 COMPREHEN METABOLIC PANEL: CPT

## 2020-09-08 PROCEDURE — 84443 ASSAY THYROID STIM HORMONE: CPT

## 2020-09-08 PROCEDURE — 80061 LIPID PANEL: CPT

## 2020-09-08 PROCEDURE — 36415 COLL VENOUS BLD VENIPUNCTURE: CPT

## 2020-09-08 PROCEDURE — 83036 HEMOGLOBIN GLYCOSYLATED A1C: CPT

## 2020-09-10 ENCOUNTER — OFFICE VISIT (OUTPATIENT)
Dept: URGENT CARE | Facility: CLINIC | Age: 50
End: 2020-09-10
Payer: COMMERCIAL

## 2020-09-10 VITALS
TEMPERATURE: 98 F | WEIGHT: 158 LBS | HEIGHT: 67 IN | SYSTOLIC BLOOD PRESSURE: 129 MMHG | HEART RATE: 67 BPM | OXYGEN SATURATION: 97 % | RESPIRATION RATE: 17 BRPM | DIASTOLIC BLOOD PRESSURE: 87 MMHG | BODY MASS INDEX: 24.8 KG/M2

## 2020-09-10 DIAGNOSIS — R11.0 NAUSEA: ICD-10-CM

## 2020-09-10 DIAGNOSIS — Z20.822 EXPOSURE TO COVID-19 VIRUS: Primary | ICD-10-CM

## 2020-09-10 LAB
CTP QC/QA: YES
SARS-COV-2 RDRP RESP QL NAA+PROBE: NEGATIVE

## 2020-09-10 PROCEDURE — 99213 OFFICE O/P EST LOW 20 MIN: CPT | Mod: S$GLB,,, | Performed by: STUDENT IN AN ORGANIZED HEALTH CARE EDUCATION/TRAINING PROGRAM

## 2020-09-10 PROCEDURE — U0002 COVID-19 LAB TEST NON-CDC: HCPCS | Mod: S$GLB,,, | Performed by: STUDENT IN AN ORGANIZED HEALTH CARE EDUCATION/TRAINING PROGRAM

## 2020-09-10 PROCEDURE — 99213 PR OFFICE/OUTPT VISIT, EST, LEVL III, 20-29 MIN: ICD-10-PCS | Mod: S$GLB,,, | Performed by: STUDENT IN AN ORGANIZED HEALTH CARE EDUCATION/TRAINING PROGRAM

## 2020-09-10 PROCEDURE — U0002: ICD-10-PCS | Mod: S$GLB,,, | Performed by: STUDENT IN AN ORGANIZED HEALTH CARE EDUCATION/TRAINING PROGRAM

## 2020-09-10 RX ORDER — ONDANSETRON 4 MG/1
4 TABLET, FILM COATED ORAL EVERY 6 HOURS PRN
Qty: 12 TABLET | Refills: 0 | Status: SHIPPED | OUTPATIENT
Start: 2020-09-10 | End: 2020-09-13

## 2020-09-10 NOTE — PROGRESS NOTES
"Subjective:       Patient ID: Naima Jeffries is a 50 y.o. female.    Vitals:  height is 5' 7" (1.702 m) and weight is 71.7 kg (158 lb). Her temperature is 98 °F (36.7 °C). Her blood pressure is 129/87 and her pulse is 67. Her respiration is 17 and oxygen saturation is 97%.     Chief Complaint: Nausea    Pt states nausea and body aches x 2 days. Pt states she came in contact with someone who tested positive for COVID. She was around this person twice in the past week and the lady just found out she was positive yesterday.     Nausea  This is a new problem. The current episode started in the past 7 days. The problem occurs constantly. The problem has been gradually improving. Associated symptoms include myalgias and nausea. Pertinent negatives include no abdominal pain, arthralgias, chest pain, chills, congestion, coughing, fatigue, fever, headaches, joint swelling, rash, sore throat, vertigo, visual change, vomiting or weakness. Associated symptoms comments: One episode of loose stools yesterday. Nothing aggravates the symptoms. She has tried nothing for the symptoms.       Constitution: Negative for chills, fatigue and fever.   HENT: Negative for congestion and sore throat.    Neck: Negative for painful lymph nodes.   Cardiovascular: Negative for chest pain and leg swelling.   Eyes: Negative for double vision and blurred vision.   Respiratory: Negative for cough and shortness of breath.    Gastrointestinal: Positive for nausea. Negative for abdominal pain, vomiting and diarrhea.   Genitourinary: Negative for dysuria, frequency, urgency and history of kidney stones.   Musculoskeletal: Positive for muscle ache. Negative for joint pain, joint swelling and muscle cramps.   Skin: Negative for color change, pale, rash and bruising.   Allergic/Immunologic: Negative for seasonal allergies.   Neurological: Negative for dizziness, history of vertigo, light-headedness, passing out and headaches.   Hematologic/Lymphatic: Negative " for swollen lymph nodes.   Psychiatric/Behavioral: Negative for nervous/anxious, sleep disturbance and depression. The patient is not nervous/anxious.        Objective:      Physical Exam   Constitutional: She is oriented to person, place, and time.  Non-toxic appearance. She does not appear ill. No distress.   HENT:   Head: Normocephalic.   Eyes: Conjunctivae are normal. Right eye exhibits no discharge.   Neck: Normal range of motion.   Pulmonary/Chest: Effort normal. No respiratory distress.   Abdominal: Normal appearance.   Neurological: She is alert and oriented to person, place, and time. Psychiatric: Her behavior is normal. Mood normal.   Nursing note and vitals reviewed.        Assessment:       1. Exposure to Covid-19 Virus    2. Nausea        Plan:         Exposure to Covid-19 Virus  -     POCT COVID-19 Rapid Screening    Nausea  -     ondansetron (ZOFRAN) 4 MG tablet; Take 1 tablet (4 mg total) by mouth every 6 (six) hours as needed for Nausea.  Dispense: 12 tablet; Refill: 0         Vitals stable. No evidence of respiratory distress noted, able to speak in complete sentences without pause.    Requesting COVID-19 testing post exposure and given symptoms and risk factors.   Tested for COVID-19 today. Rapid test was Negative. Educated on COVID-19 and COVID-19 testing. Advised on COVID-19 precautions. Discussed supportive care and OTC meds for symptom relief. Prescribed zofran for nausea, use as directed. Advised on return/follow-up precautions. Advised on ER precautions. Answered all patient questions. Patient verbalized understanding and voiced agreement with current treatment plan.

## 2020-09-10 NOTE — PATIENT INSTRUCTIONS
"  Viral Syndrome (Adult)  A viral illness may cause a number of symptoms. The symptoms depend on the part of the body that the virus affects. If it settles in your nose, throat, and lungs, it may cause cough, sore throat, congestion, and sometimes headache. If it settles in your stomach and intestinal tract, it may cause vomiting and diarrhea. Sometimes it causes vague symptoms like "aching all over," feeling tired, loss of appetite, or fever.  A viral illness usually lasts 1 to 2 weeks, but sometimes it lasts longer. In some cases, a more serious infection can look like a viral syndrome in the first few days of the illness. You may need another exam and additional tests to know the difference. Watch for the warning signs listed below.  Home care  Follow these guidelines for taking care of yourself at home:  · If symptoms are severe, rest at home for the first 2 to 3 days.  · Stay away from cigarette smoke - both your smoke and the smoke from others.  · You may use over-the-counter acetaminophen or ibuprofen for fever, muscle aching, and headache, unless another medicine was prescribed for this. If you have chronic liver or kidney disease or ever had a stomach ulcer or GI bleeding, talk with your doctor before using these medicines. No one who is younger than 18 and ill with a fever should take aspirin. It may cause severe disease or death.  · Your appetite may be poor, so a light diet is fine. Avoid dehydration by drinking 8 to 12 8-ounce glasses of fluids each day. This may include water; orange juice; lemonade; apple, grape, and cranberry juice; clear fruit drinks; electrolyte replacement and sports drinks; and decaffeinated teas and coffee. If you have been diagnosed with a kidney disease, ask your doctor how much and what types of fluids you should drink to prevent dehydration. If you have kidney disease, drinking too much fluid can cause it build up in the your body and be dangerous to your " health.  · Over-the-counter remedies won't shorten the length of the illness but may be helpful for cough, sore throat; and nasal and sinus congestion. Don't use decongestants if you have high blood pressure.  Follow-up care  Follow up with your healthcare provider if you do not improve over the next week.  Call 911  Get emergency medical care if any of the following occur:  · Convulsion  · Feeling weak, dizzy, or like you are going to faint  · Chest pain, shortness of breath, wheezing, or difficulty breathing  When to seek medical advice  Call your healthcare provider right away if any of these occur:  · Cough with lots of colored sputum (mucus) or blood in your sputum  · Chest pain, shortness of breath, wheezing, or difficulty breathing  · Severe headache; face, neck, or ear pain  · Severe, constant pain in the lower right side of your belly (abdominal)  · Continued vomiting (cant keep liquids down)  · Frequent diarrhea (more than 5 times a day); blood (red or black color) or mucus in diarrhea  · Feeling weak, dizzy, or like you are going to faint  · Extreme thirst  · Fever of 100.4°F (38°C) or higher, or as directed by your healthcare provider  Date Last Reviewed: 9/25/2015  © 0681-6693 [employer]. [address] [place] [address], [place] [address]. All rights reserved. This information is not intended as a substitute for professional medical care. Always follow your healthcare professional's instructions.

## 2020-09-16 ENCOUNTER — HOSPITAL ENCOUNTER (OUTPATIENT)
Dept: RADIOLOGY | Facility: HOSPITAL | Age: 50
Discharge: HOME | End: 2020-09-16
Attending: NURSE PRACTITIONER
Payer: COMMERCIAL

## 2020-09-16 DIAGNOSIS — R92.8 ABNORMAL MAMMOGRAM: ICD-10-CM

## 2020-09-16 PROCEDURE — 77065 DX MAMMO INCL CAD UNI: CPT | Mod: TC,LT

## 2020-09-16 PROCEDURE — 77061 MAMMO DIGITAL DIAGNOSTIC LEFT WITH TOMOSYNTHESIS_CAD: ICD-10-PCS | Mod: 26,LT,, | Performed by: RADIOLOGY

## 2020-09-16 PROCEDURE — 77061 BREAST TOMOSYNTHESIS UNI: CPT | Mod: TC,LT

## 2020-09-16 PROCEDURE — 77065 DX MAMMO INCL CAD UNI: CPT | Mod: 26,LT,, | Performed by: RADIOLOGY

## 2020-09-16 PROCEDURE — 77065 MAMMO DIGITAL DIAGNOSTIC LEFT WITH TOMOSYNTHESIS_CAD: ICD-10-PCS | Mod: 26,LT,, | Performed by: RADIOLOGY

## 2020-09-16 PROCEDURE — 77061 BREAST TOMOSYNTHESIS UNI: CPT | Mod: 26,LT,, | Performed by: RADIOLOGY

## 2020-09-21 ENCOUNTER — TELEPHONE (OUTPATIENT)
Dept: OPTOMETRY | Facility: CLINIC | Age: 50
End: 2020-09-21

## 2020-11-02 ENCOUNTER — PATIENT MESSAGE (OUTPATIENT)
Dept: INTERNAL MEDICINE | Facility: CLINIC | Age: 50
End: 2020-11-02

## 2020-11-12 ENCOUNTER — PATIENT MESSAGE (OUTPATIENT)
Dept: INTERNAL MEDICINE | Facility: CLINIC | Age: 50
End: 2020-11-12

## 2021-07-28 ENCOUNTER — PATIENT MESSAGE (OUTPATIENT)
Dept: INTERNAL MEDICINE | Facility: CLINIC | Age: 51
End: 2021-07-28

## 2021-07-28 ENCOUNTER — PATIENT MESSAGE (OUTPATIENT)
Dept: OBSTETRICS AND GYNECOLOGY | Facility: CLINIC | Age: 51
End: 2021-07-28

## 2021-07-28 RX ORDER — ESCITALOPRAM OXALATE 10 MG/1
10 TABLET ORAL DAILY
Qty: 90 TABLET | Refills: 0 | Status: SHIPPED | OUTPATIENT
Start: 2021-07-28 | End: 2021-11-05 | Stop reason: SDUPTHER

## 2021-07-29 ENCOUNTER — TELEPHONE (OUTPATIENT)
Dept: INTERNAL MEDICINE | Facility: CLINIC | Age: 51
End: 2021-07-29

## 2021-07-29 DIAGNOSIS — Z12.11 SCREENING FOR COLON CANCER: Primary | ICD-10-CM

## 2021-08-10 ENCOUNTER — OFFICE VISIT (OUTPATIENT)
Dept: OBSTETRICS AND GYNECOLOGY | Facility: CLINIC | Age: 51
End: 2021-08-10
Payer: COMMERCIAL

## 2021-08-10 ENCOUNTER — LAB VISIT (OUTPATIENT)
Dept: LAB | Facility: HOSPITAL | Age: 51
End: 2021-08-10
Attending: NURSE PRACTITIONER
Payer: COMMERCIAL

## 2021-08-10 VITALS — WEIGHT: 156 LBS | SYSTOLIC BLOOD PRESSURE: 126 MMHG | DIASTOLIC BLOOD PRESSURE: 70 MMHG | BODY MASS INDEX: 24.43 KG/M2

## 2021-08-10 DIAGNOSIS — N95.1 PERIMENOPAUSE: ICD-10-CM

## 2021-08-10 DIAGNOSIS — Z11.3 SCREEN FOR STD (SEXUALLY TRANSMITTED DISEASE): ICD-10-CM

## 2021-08-10 DIAGNOSIS — N89.8 VAGINAL DRYNESS: ICD-10-CM

## 2021-08-10 DIAGNOSIS — N93.0 PCB (POST COITAL BLEEDING): ICD-10-CM

## 2021-08-10 DIAGNOSIS — Z12.4 SCREENING FOR MALIGNANT NEOPLASM OF CERVIX: ICD-10-CM

## 2021-08-10 DIAGNOSIS — Z01.419 ENCOUNTER FOR ANNUAL ROUTINE GYNECOLOGICAL EXAMINATION: Primary | ICD-10-CM

## 2021-08-10 DIAGNOSIS — Z12.31 ENCOUNTER FOR SCREENING MAMMOGRAM FOR MALIGNANT NEOPLASM OF BREAST: ICD-10-CM

## 2021-08-10 DIAGNOSIS — Z30.011 ENCOUNTER FOR INITIAL PRESCRIPTION OF CONTRACEPTIVE PILLS: ICD-10-CM

## 2021-08-10 PROCEDURE — 99396 PR PREVENTIVE VISIT,EST,40-64: ICD-10-PCS | Mod: S$GLB,,, | Performed by: NURSE PRACTITIONER

## 2021-08-10 PROCEDURE — 83001 ASSAY OF GONADOTROPIN (FSH): CPT | Performed by: NURSE PRACTITIONER

## 2021-08-10 PROCEDURE — 99999 PR PBB SHADOW E&M-EST. PATIENT-LVL III: ICD-10-PCS | Mod: PBBFAC,,, | Performed by: NURSE PRACTITIONER

## 2021-08-10 PROCEDURE — 86592 SYPHILIS TEST NON-TREP QUAL: CPT | Performed by: NURSE PRACTITIONER

## 2021-08-10 PROCEDURE — 36415 COLL VENOUS BLD VENIPUNCTURE: CPT | Mod: PN | Performed by: NURSE PRACTITIONER

## 2021-08-10 PROCEDURE — 82670 ASSAY OF TOTAL ESTRADIOL: CPT | Performed by: NURSE PRACTITIONER

## 2021-08-10 PROCEDURE — 88175 CYTOPATH C/V AUTO FLUID REDO: CPT | Performed by: NURSE PRACTITIONER

## 2021-08-10 PROCEDURE — 80074 ACUTE HEPATITIS PANEL: CPT | Performed by: NURSE PRACTITIONER

## 2021-08-10 PROCEDURE — 87389 HIV-1 AG W/HIV-1&-2 AB AG IA: CPT | Performed by: NURSE PRACTITIONER

## 2021-08-10 PROCEDURE — 87591 N.GONORRHOEAE DNA AMP PROB: CPT | Performed by: NURSE PRACTITIONER

## 2021-08-10 PROCEDURE — 87624 HPV HI-RISK TYP POOLED RSLT: CPT | Performed by: NURSE PRACTITIONER

## 2021-08-10 PROCEDURE — 87491 CHLMYD TRACH DNA AMP PROBE: CPT | Performed by: NURSE PRACTITIONER

## 2021-08-10 PROCEDURE — 99999 PR PBB SHADOW E&M-EST. PATIENT-LVL III: CPT | Mod: PBBFAC,,, | Performed by: NURSE PRACTITIONER

## 2021-08-10 PROCEDURE — 99396 PREV VISIT EST AGE 40-64: CPT | Mod: S$GLB,,, | Performed by: NURSE PRACTITIONER

## 2021-08-10 PROCEDURE — 87481 CANDIDA DNA AMP PROBE: CPT | Mod: 59 | Performed by: NURSE PRACTITIONER

## 2021-08-10 RX ORDER — LEVONORGESTREL AND ETHINYL ESTRADIOL 0.1-0.02MG
1 KIT ORAL DAILY
Qty: 90 TABLET | Refills: 4 | Status: SHIPPED | OUTPATIENT
Start: 2021-08-10 | End: 2021-12-07

## 2021-08-10 RX ORDER — PRASTERONE 6.5 MG/1
6.5 INSERT VAGINAL NIGHTLY
Qty: 90 EACH | Refills: 4 | Status: SHIPPED | OUTPATIENT
Start: 2021-08-10 | End: 2021-11-05

## 2021-08-11 LAB
C TRACH DNA SPEC QL NAA+PROBE: NOT DETECTED
ESTRADIOL SERPL-MCNC: <10 PG/ML
FSH SERPL-ACNC: 37.78 MIU/ML
HAV IGM SERPL QL IA: NEGATIVE
HBV CORE IGM SERPL QL IA: NEGATIVE
HBV SURFACE AG SERPL QL IA: NEGATIVE
HCV AB SERPL QL IA: NEGATIVE
HIV 1+2 AB+HIV1 P24 AG SERPL QL IA: NEGATIVE
N GONORRHOEA DNA SPEC QL NAA+PROBE: NOT DETECTED
RPR SER QL: NORMAL

## 2021-08-13 LAB
BACTERIAL VAGINOSIS DNA: NEGATIVE
CANDIDA GLABRATA DNA: NEGATIVE
CANDIDA KRUSEI DNA: NEGATIVE
CANDIDA RRNA VAG QL PROBE: NEGATIVE
T VAGINALIS RRNA GENITAL QL PROBE: NEGATIVE

## 2021-08-17 LAB
FINAL PATHOLOGIC DIAGNOSIS: NORMAL
Lab: NORMAL

## 2021-09-21 ENCOUNTER — HOSPITAL ENCOUNTER (OUTPATIENT)
Dept: RADIOLOGY | Facility: HOSPITAL | Age: 51
Discharge: HOME | End: 2021-09-21
Attending: NURSE PRACTITIONER
Payer: COMMERCIAL

## 2021-09-21 VITALS — BODY MASS INDEX: 23.54 KG/M2 | WEIGHT: 150 LBS | HEIGHT: 67 IN

## 2021-09-21 DIAGNOSIS — Z12.31 ENCOUNTER FOR SCREENING MAMMOGRAM FOR MALIGNANT NEOPLASM OF BREAST: ICD-10-CM

## 2021-09-21 PROCEDURE — 77067 SCR MAMMO BI INCL CAD: CPT | Mod: TC,PN

## 2021-09-21 PROCEDURE — 77063 MAMMO DIGITAL SCREENING BILAT WITH TOMO: ICD-10-PCS | Mod: 26,,, | Performed by: RADIOLOGY

## 2021-09-21 PROCEDURE — 77067 SCR MAMMO BI INCL CAD: CPT | Mod: 26,,, | Performed by: RADIOLOGY

## 2021-09-21 PROCEDURE — 77067 MAMMO DIGITAL SCREENING BILAT WITH TOMO: ICD-10-PCS | Mod: 26,,, | Performed by: RADIOLOGY

## 2021-09-21 PROCEDURE — 77063 BREAST TOMOSYNTHESIS BI: CPT | Mod: 26,,, | Performed by: RADIOLOGY

## 2021-11-05 ENCOUNTER — LAB VISIT (OUTPATIENT)
Dept: LAB | Facility: HOSPITAL | Age: 51
End: 2021-11-05
Attending: INTERNAL MEDICINE
Payer: COMMERCIAL

## 2021-11-05 ENCOUNTER — IMMUNIZATION (OUTPATIENT)
Dept: INTERNAL MEDICINE | Facility: CLINIC | Age: 51
End: 2021-11-05
Payer: COMMERCIAL

## 2021-11-05 ENCOUNTER — OFFICE VISIT (OUTPATIENT)
Dept: INTERNAL MEDICINE | Facility: CLINIC | Age: 51
End: 2021-11-05
Payer: COMMERCIAL

## 2021-11-05 VITALS
WEIGHT: 156.5 LBS | TEMPERATURE: 98 F | DIASTOLIC BLOOD PRESSURE: 72 MMHG | HEART RATE: 63 BPM | SYSTOLIC BLOOD PRESSURE: 118 MMHG | BODY MASS INDEX: 24.56 KG/M2 | OXYGEN SATURATION: 99 % | HEIGHT: 67 IN

## 2021-11-05 DIAGNOSIS — Z00.00 WELLNESS EXAMINATION: ICD-10-CM

## 2021-11-05 DIAGNOSIS — R21 RASH: ICD-10-CM

## 2021-11-05 DIAGNOSIS — Z00.00 WELLNESS EXAMINATION: Primary | ICD-10-CM

## 2021-11-05 DIAGNOSIS — M54.9 DORSALGIA, UNSPECIFIED: ICD-10-CM

## 2021-11-05 LAB
25(OH)D3+25(OH)D2 SERPL-MCNC: 42 NG/ML (ref 30–96)
ALBUMIN SERPL BCP-MCNC: 4.1 G/DL (ref 3.5–5.2)
ALP SERPL-CCNC: 38 U/L (ref 55–135)
ALT SERPL W/O P-5'-P-CCNC: 13 U/L (ref 10–44)
ANION GAP SERPL CALC-SCNC: 9 MMOL/L (ref 8–16)
AST SERPL-CCNC: 21 U/L (ref 10–40)
BASOPHILS # BLD AUTO: 0.06 K/UL (ref 0–0.2)
BASOPHILS NFR BLD: 1.1 % (ref 0–1.9)
BILIRUB SERPL-MCNC: 0.2 MG/DL (ref 0.1–1)
BUN SERPL-MCNC: 13 MG/DL (ref 6–20)
CALCIUM SERPL-MCNC: 9.5 MG/DL (ref 8.7–10.5)
CHLORIDE SERPL-SCNC: 102 MMOL/L (ref 95–110)
CHOLEST SERPL-MCNC: 217 MG/DL (ref 120–199)
CHOLEST/HDLC SERPL: 2.1 {RATIO} (ref 2–5)
CO2 SERPL-SCNC: 27 MMOL/L (ref 23–29)
CREAT SERPL-MCNC: 0.9 MG/DL (ref 0.5–1.4)
DIFFERENTIAL METHOD: ABNORMAL
EOSINOPHIL # BLD AUTO: 0.2 K/UL (ref 0–0.5)
EOSINOPHIL NFR BLD: 4.1 % (ref 0–8)
ERYTHROCYTE [DISTWIDTH] IN BLOOD BY AUTOMATED COUNT: 13 % (ref 11.5–14.5)
EST. GFR  (AFRICAN AMERICAN): >60 ML/MIN/1.73 M^2
EST. GFR  (NON AFRICAN AMERICAN): >60 ML/MIN/1.73 M^2
ESTIMATED AVG GLUCOSE: 100 MG/DL (ref 68–131)
FSH SERPL-ACNC: 18.8 MIU/ML
GLUCOSE SERPL-MCNC: 87 MG/DL (ref 70–110)
HBA1C MFR BLD: 5.1 % (ref 4–5.6)
HCT VFR BLD AUTO: 42.7 % (ref 37–48.5)
HDLC SERPL-MCNC: 102 MG/DL (ref 40–75)
HDLC SERPL: 47 % (ref 20–50)
HGB BLD-MCNC: 13.4 G/DL (ref 12–16)
IMM GRANULOCYTES # BLD AUTO: 0.01 K/UL (ref 0–0.04)
IMM GRANULOCYTES NFR BLD AUTO: 0.2 % (ref 0–0.5)
LDLC SERPL CALC-MCNC: 102.6 MG/DL (ref 63–159)
LYMPHOCYTES # BLD AUTO: 1.9 K/UL (ref 1–4.8)
LYMPHOCYTES NFR BLD: 36.2 % (ref 18–48)
MCH RBC QN AUTO: 29.8 PG (ref 27–31)
MCHC RBC AUTO-ENTMCNC: 31.4 G/DL (ref 32–36)
MCV RBC AUTO: 95 FL (ref 82–98)
MONOCYTES # BLD AUTO: 0.6 K/UL (ref 0.3–1)
MONOCYTES NFR BLD: 10.7 % (ref 4–15)
NEUTROPHILS # BLD AUTO: 2.5 K/UL (ref 1.8–7.7)
NEUTROPHILS NFR BLD: 47.7 % (ref 38–73)
NONHDLC SERPL-MCNC: 115 MG/DL
NRBC BLD-RTO: 0 /100 WBC
PLATELET # BLD AUTO: 268 K/UL (ref 150–450)
PMV BLD AUTO: 11.5 FL (ref 9.2–12.9)
POTASSIUM SERPL-SCNC: 4.4 MMOL/L (ref 3.5–5.1)
PROT SERPL-MCNC: 7.4 G/DL (ref 6–8.4)
RBC # BLD AUTO: 4.5 M/UL (ref 4–5.4)
SODIUM SERPL-SCNC: 138 MMOL/L (ref 136–145)
TRIGL SERPL-MCNC: 62 MG/DL (ref 30–150)
TSH SERPL DL<=0.005 MIU/L-ACNC: 1.7 UIU/ML (ref 0.4–4)
WBC # BLD AUTO: 5.31 K/UL (ref 3.9–12.7)

## 2021-11-05 PROCEDURE — 90471 IMMUNIZATION ADMIN: CPT | Mod: S$GLB,,, | Performed by: INTERNAL MEDICINE

## 2021-11-05 PROCEDURE — 85025 COMPLETE CBC W/AUTO DIFF WBC: CPT | Performed by: INTERNAL MEDICINE

## 2021-11-05 PROCEDURE — 80053 COMPREHEN METABOLIC PANEL: CPT | Performed by: INTERNAL MEDICINE

## 2021-11-05 PROCEDURE — 80061 LIPID PANEL: CPT | Performed by: INTERNAL MEDICINE

## 2021-11-05 PROCEDURE — 84443 ASSAY THYROID STIM HORMONE: CPT | Performed by: INTERNAL MEDICINE

## 2021-11-05 PROCEDURE — 99999 PR PBB SHADOW E&M-EST. PATIENT-LVL V: CPT | Mod: PBBFAC,,, | Performed by: INTERNAL MEDICINE

## 2021-11-05 PROCEDURE — 90686 IIV4 VACC NO PRSV 0.5 ML IM: CPT | Mod: S$GLB,,, | Performed by: INTERNAL MEDICINE

## 2021-11-05 PROCEDURE — 99396 PREV VISIT EST AGE 40-64: CPT | Mod: 25,S$GLB,, | Performed by: INTERNAL MEDICINE

## 2021-11-05 PROCEDURE — 90686 FLU VACCINE (QUAD) GREATER THAN OR EQUAL TO 3YO PRESERVATIVE FREE IM: ICD-10-PCS | Mod: S$GLB,,, | Performed by: INTERNAL MEDICINE

## 2021-11-05 PROCEDURE — 90471 FLU VACCINE (QUAD) GREATER THAN OR EQUAL TO 3YO PRESERVATIVE FREE IM: ICD-10-PCS | Mod: S$GLB,,, | Performed by: INTERNAL MEDICINE

## 2021-11-05 PROCEDURE — 82306 VITAMIN D 25 HYDROXY: CPT | Performed by: INTERNAL MEDICINE

## 2021-11-05 PROCEDURE — 83001 ASSAY OF GONADOTROPIN (FSH): CPT | Performed by: INTERNAL MEDICINE

## 2021-11-05 PROCEDURE — 36415 COLL VENOUS BLD VENIPUNCTURE: CPT | Performed by: INTERNAL MEDICINE

## 2021-11-05 PROCEDURE — 99396 PR PREVENTIVE VISIT,EST,40-64: ICD-10-PCS | Mod: 25,S$GLB,, | Performed by: INTERNAL MEDICINE

## 2021-11-05 PROCEDURE — 99999 PR PBB SHADOW E&M-EST. PATIENT-LVL V: ICD-10-PCS | Mod: PBBFAC,,, | Performed by: INTERNAL MEDICINE

## 2021-11-05 PROCEDURE — 83036 HEMOGLOBIN GLYCOSYLATED A1C: CPT | Performed by: INTERNAL MEDICINE

## 2021-11-05 RX ORDER — MELOXICAM 15 MG/1
15 TABLET ORAL DAILY
Qty: 20 TABLET | Refills: 0 | Status: SHIPPED | OUTPATIENT
Start: 2021-11-05 | End: 2022-06-10

## 2021-11-05 RX ORDER — ESCITALOPRAM OXALATE 10 MG/1
10 TABLET ORAL DAILY
Qty: 90 TABLET | Refills: 3 | Status: SHIPPED | OUTPATIENT
Start: 2021-11-05 | End: 2023-01-05

## 2021-11-05 RX ORDER — NYSTATIN AND TRIAMCINOLONE ACETONIDE 100000; 1 [USP'U]/G; MG/G
CREAM TOPICAL 2 TIMES DAILY
Qty: 60 G | Refills: 0 | Status: SHIPPED | OUTPATIENT
Start: 2021-11-05 | End: 2022-06-10

## 2021-11-08 ENCOUNTER — HOSPITAL ENCOUNTER (OUTPATIENT)
Dept: RADIOLOGY | Facility: HOSPITAL | Age: 51
Discharge: HOME | End: 2021-11-08
Attending: INTERNAL MEDICINE
Payer: COMMERCIAL

## 2021-11-08 DIAGNOSIS — Z00.00 WELLNESS EXAMINATION: ICD-10-CM

## 2021-11-08 PROCEDURE — 70450 CT HEAD/BRAIN W/O DYE: CPT | Mod: 26,,, | Performed by: RADIOLOGY

## 2021-11-08 PROCEDURE — 70450 CT HEAD/BRAIN W/O DYE: CPT | Mod: TC

## 2021-11-08 PROCEDURE — 70450 CT HEAD WITHOUT CONTRAST: ICD-10-PCS | Mod: 26,,, | Performed by: RADIOLOGY

## 2021-11-09 ENCOUNTER — PATIENT MESSAGE (OUTPATIENT)
Dept: INTERNAL MEDICINE | Facility: CLINIC | Age: 51
End: 2021-11-09
Payer: COMMERCIAL

## 2021-11-10 ENCOUNTER — HOSPITAL ENCOUNTER (OUTPATIENT)
Dept: RADIOLOGY | Facility: HOSPITAL | Age: 51
Discharge: HOME | End: 2021-11-10
Attending: INTERNAL MEDICINE
Payer: COMMERCIAL

## 2021-11-10 DIAGNOSIS — Z00.00 WELLNESS EXAMINATION: ICD-10-CM

## 2021-11-10 PROCEDURE — 73521 XR HIPS BILATERAL 2 VIEW INCL AP PELVIS: ICD-10-PCS | Mod: 26,,, | Performed by: RADIOLOGY

## 2021-11-10 PROCEDURE — 73521 X-RAY EXAM HIPS BI 2 VIEWS: CPT | Mod: 26,,, | Performed by: RADIOLOGY

## 2021-11-10 PROCEDURE — 73521 X-RAY EXAM HIPS BI 2 VIEWS: CPT | Mod: TC,PN

## 2021-12-03 ENCOUNTER — HOSPITAL ENCOUNTER (OUTPATIENT)
Dept: RADIOLOGY | Facility: HOSPITAL | Age: 51
Discharge: HOME | End: 2021-12-03
Attending: INTERNAL MEDICINE
Payer: COMMERCIAL

## 2021-12-03 DIAGNOSIS — M54.9 DORSALGIA, UNSPECIFIED: ICD-10-CM

## 2021-12-03 PROCEDURE — 72148 MRI LUMBAR SPINE WITHOUT CONTRAST: ICD-10-PCS | Mod: 26,,, | Performed by: RADIOLOGY

## 2021-12-03 PROCEDURE — 72148 MRI LUMBAR SPINE W/O DYE: CPT | Mod: TC

## 2021-12-03 PROCEDURE — 72148 MRI LUMBAR SPINE W/O DYE: CPT | Mod: 26,,, | Performed by: RADIOLOGY

## 2021-12-07 ENCOUNTER — OFFICE VISIT (OUTPATIENT)
Dept: OBSTETRICS AND GYNECOLOGY | Facility: CLINIC | Age: 51
End: 2021-12-07
Payer: COMMERCIAL

## 2021-12-07 VITALS
HEIGHT: 67 IN | DIASTOLIC BLOOD PRESSURE: 74 MMHG | BODY MASS INDEX: 24.65 KG/M2 | WEIGHT: 157.06 LBS | SYSTOLIC BLOOD PRESSURE: 108 MMHG

## 2021-12-07 DIAGNOSIS — Z71.89 COUNSELING FOR HORMONE REPLACEMENT THERAPY: Primary | ICD-10-CM

## 2021-12-07 DIAGNOSIS — Z00.00 WELLNESS EXAMINATION: ICD-10-CM

## 2021-12-07 PROCEDURE — 99999 PR PBB SHADOW E&M-EST. PATIENT-LVL III: ICD-10-PCS | Mod: PBBFAC,,, | Performed by: OBSTETRICS & GYNECOLOGY

## 2021-12-07 PROCEDURE — 99214 PR OFFICE/OUTPT VISIT, EST, LEVL IV, 30-39 MIN: ICD-10-PCS | Mod: S$GLB,,, | Performed by: OBSTETRICS & GYNECOLOGY

## 2021-12-07 PROCEDURE — 99214 OFFICE O/P EST MOD 30 MIN: CPT | Mod: S$GLB,,, | Performed by: OBSTETRICS & GYNECOLOGY

## 2021-12-07 PROCEDURE — 99999 PR PBB SHADOW E&M-EST. PATIENT-LVL III: CPT | Mod: PBBFAC,,, | Performed by: OBSTETRICS & GYNECOLOGY

## 2022-01-08 ENCOUNTER — OFFICE VISIT (OUTPATIENT)
Dept: URGENT CARE | Facility: CLINIC | Age: 52
End: 2022-01-08
Payer: COMMERCIAL

## 2022-01-08 VITALS
DIASTOLIC BLOOD PRESSURE: 81 MMHG | TEMPERATURE: 98 F | RESPIRATION RATE: 18 BRPM | OXYGEN SATURATION: 98 % | HEART RATE: 80 BPM | SYSTOLIC BLOOD PRESSURE: 119 MMHG

## 2022-01-08 DIAGNOSIS — R05.9 COUGH: ICD-10-CM

## 2022-01-08 DIAGNOSIS — U07.1 COVID-19: ICD-10-CM

## 2022-01-08 DIAGNOSIS — J02.9 SORE THROAT: Primary | ICD-10-CM

## 2022-01-08 LAB
CTP QC/QA: YES
SARS-COV-2 RDRP RESP QL NAA+PROBE: POSITIVE

## 2022-01-08 PROCEDURE — 99213 PR OFFICE/OUTPT VISIT, EST, LEVL III, 20-29 MIN: ICD-10-PCS | Mod: S$GLB,,, | Performed by: FAMILY MEDICINE

## 2022-01-08 PROCEDURE — 1159F MED LIST DOCD IN RCRD: CPT | Mod: CPTII,S$GLB,, | Performed by: FAMILY MEDICINE

## 2022-01-08 PROCEDURE — 1160F RVW MEDS BY RX/DR IN RCRD: CPT | Mod: CPTII,S$GLB,, | Performed by: FAMILY MEDICINE

## 2022-01-08 PROCEDURE — 3074F PR MOST RECENT SYSTOLIC BLOOD PRESSURE < 130 MM HG: ICD-10-PCS | Mod: CPTII,S$GLB,, | Performed by: FAMILY MEDICINE

## 2022-01-08 PROCEDURE — 99213 OFFICE O/P EST LOW 20 MIN: CPT | Mod: S$GLB,,, | Performed by: FAMILY MEDICINE

## 2022-01-08 PROCEDURE — 3079F DIAST BP 80-89 MM HG: CPT | Mod: CPTII,S$GLB,, | Performed by: FAMILY MEDICINE

## 2022-01-08 PROCEDURE — 1160F PR REVIEW ALL MEDS BY PRESCRIBER/CLIN PHARMACIST DOCUMENTED: ICD-10-PCS | Mod: CPTII,S$GLB,, | Performed by: FAMILY MEDICINE

## 2022-01-08 PROCEDURE — 1159F PR MEDICATION LIST DOCUMENTED IN MEDICAL RECORD: ICD-10-PCS | Mod: CPTII,S$GLB,, | Performed by: FAMILY MEDICINE

## 2022-01-08 PROCEDURE — U0002: ICD-10-PCS | Mod: QW,S$GLB,, | Performed by: FAMILY MEDICINE

## 2022-01-08 PROCEDURE — 3079F PR MOST RECENT DIASTOLIC BLOOD PRESSURE 80-89 MM HG: ICD-10-PCS | Mod: CPTII,S$GLB,, | Performed by: FAMILY MEDICINE

## 2022-01-08 PROCEDURE — 3074F SYST BP LT 130 MM HG: CPT | Mod: CPTII,S$GLB,, | Performed by: FAMILY MEDICINE

## 2022-01-08 PROCEDURE — U0002 COVID-19 LAB TEST NON-CDC: HCPCS | Mod: QW,S$GLB,, | Performed by: FAMILY MEDICINE

## 2022-01-08 NOTE — PATIENT INSTRUCTIONS
Patient Education       COVID-19 Discharge Instructions   About this topic   Coronavirus disease 2019 is also known as COVID-19. It is a viral illness that infects the lungs. It is caused by a virus called SARS-associated coronavirus (SARS-CoV-2).  The signs of COVID-19 most often start a few days after you have been infected. In some people, it takes longer to show signs. Others never show signs of the infection. You may have a cough, fever, shaking chills and it may be hard to breathe. You may be very tired, have muscle aches, a headache or sore throat. Some people have an upset stomach or loose stools. Others lose their sense of smell or taste. You may not have these signs all the time and they may come and go while you are sick.  The virus spreads easily through droplets when you talk, sneeze, or cough. You can pass the virus to others when you are talking close together, singing, hugging, sharing food, or shaking hands. Doctors believe the germs also survive on surfaces like tables, door handles, and telephones. However, this is not a common way that COVID-19 spreads. Doctors believe you can also spread the infection even if you dont have any symptoms, but they do not know how that happens. This is why getting vaccinated is one of the best ways to keep you healthy and slow the spread of the virus.  Some people have a mild case of COVID-19 and are able to stay at home and away from others until they feel better. Others may need to be in the hospital if they are very sick. Some people with COVID-19 can have some symptoms for weeks or months. People with COVID-19 must isolate themselves. You can start to be around others when your doctor says it is safe to do so.       What care is needed at home?   · Ask your doctor what you need to do when you go home. Make sure you ask questions if you do not understand what the doctor says.  · Drink lots of water, juice, or broth to replace fluids lost from a fever.  · You  may use cool mist humidifiers to help ease congestion and coughing.  · Use 2 to 3 pillows to prop yourself up when you lie down to make it easier to breathe and sleep.  · Do not smoke and do not drink beer, wine, and mixed drinks (alcohol).  · To lower the chance of passing the infection to others, get a COVID-19 vaccine after your infection has resolved.  · If you have not been fully vaccinated:  ? Wear a mask over your mouth and nose if you are around others who are not sick. Cloth masks work best if they have more than one layer of fabric.  ? Wash your hands often.  ? Stay home in a separate room, if possible, away from others. Only go out to get medical care.  ? Use a separate bathroom if possible.  ? Do not make food for others.  What follow-up care is needed?   · Your doctor may ask you to make visits to the office to check on your progress. Be sure to keep these visits. Make sure you wear a mask at these visits.  · If you can, tell the staff you have COVID-19 ahead of time so they can take extra care to stop the disease from spreading.  · It may take a few weeks before your health returns to normal.  What drugs may be needed?   The doctor may order drugs to:  · Help with breathing  · Help with fever  · Help with swelling in your airways and lungs  · Control coughing  · Ease a sore throat  · Help a runny or stuffy nose  Will physical activity be limited?   You may have to limit your physical activity. Talk to your doctor about the right amount of activity for you. If you have been very sick with COVID-19, it can take some time to get your strength back.  Will there be any other care needed?   Doctors do not know how long you can pass the virus on to others after you are sick. This is why it is important to stay in a separate room, if possible, when you are sick. For now, doctors are giving general guidelines for you to follow after you have been sick. Before you go around other people, you should:  · Be fever  free for 24 hours without taking any drugs to lower the fever  · Have no symptoms of cough or shortness of breath  · Wait at least 10 days after first having symptoms or your first positive test, and you need to be symptom free as above. Some experts suggest waiting 20 days if you have had a more severe infection.  Talk with your doctor about getting a COVID-19 vaccine.  What problems could happen?   · Fluid loss. This is dehydration.  · Short-term or long-term lung damage  · Heart problems  · Death  When do I need to call the doctor?   · You are having so much trouble breathing that you can only say one or two words at a time.  · You need to sit upright at all times to be able to breathe and/or cannot lie down.  · You are very confused or cannot stay awake.  · Your lips or skin start to turn blue or grey.  · You think you might be having a medical emergency. Some examples of medical emergencies are:  ? Severe chest pain.  ? Not able to speak or move normally.  · You have trouble breathing when talking or sitting still.  · You have new shortness of breath.  · You become weak or dizzy.  · You have very dark urine or do not pass urine for more than 8 hours.  · You have new or worsening COVID-19 symptoms like:  ? Fever  ? Cough  ? Feeling very tired  ? Shaking chills  ? Headache  ? Trouble swallowing  ? Throwing up  ? Loose stools  ? Reddish purple spots on your fingers or toes  Teach Back: Helping You Understand   The Teach Back Method helps you understand the information we are giving you. After you talk with the staff, tell them in your own words what you learned. This helps to make sure the staff has described each thing clearly. It also helps to explain things that may have been confusing. Before going home, make sure you can do these:  · I can tell you about my condition.  · I can tell you what may help ease my breathing.  · I can tell you what I can do to help avoid passing the infection to others.  · I can tell  you what I will do if I have trouble breathing; feel sleepy or confused; or my fingertips, fingernails, skin, or lips are blue.  Where can I learn more?   Centers for Disease Control and Prevention  https://www.cdc.gov/coronavirus/2019-ncov/about/index.html   Centers for Disease Control and Prevention  https://www.cdc.gov/coronavirus/2019-ncov/hcp/disposition-in-home-patients.html   World Health Organization  https://www.who.int/news-room/q-a-detail/z-o-uwkuvyyrrumyh   Last Reviewed Date   2021-10-05  Consumer Information Use and Disclaimer   This information is not specific medical advice and does not replace information you receive from your health care provider. This is only a brief summary of general information. It does NOT include all information about conditions, illnesses, injuries, tests, procedures, treatments, therapies, discharge instructions or life-style choices that may apply to you. You must talk with your health care provider for complete information about your health and treatment options. This information should not be used to decide whether or not to accept your health care providers advice, instructions or recommendations. Only your health care provider has the knowledge and training to provide advice that is right for you.  Copyright   Copyright © 2021 [employer] and its affiliates and/or licensors. All rights reserved.

## 2022-01-08 NOTE — PROGRESS NOTES
Subjective:       Patient ID: Naima Jeffries is a 51 y.o. female.    Vitals:  temperature is 98.4 °F (36.9 °C). Her blood pressure is 119/81 and her pulse is 80. Her respiration is 18 and oxygen saturation is 98%.     Chief Complaint: Sore Throat    Patient presents with c/o sore throat, sinus congestion and cough that started a week ago. Pt reports a +ve home covid test 6 days ago. States the symptoms are gradually getting better. Deneis fever, chills, CP, SOB, HA, body aches, weakness/dizziness, N/V, diarrhea, abdominal pain, dysuria, loss of smell or taste.   She requests another covid test today as she needs a neg to go back to work.     Sore Throat   This is a new problem. The current episode started in the past 7 days. The problem has been unchanged. Associated symptoms include coughing. She has tried nothing for the symptoms.       HENT: Positive for sore throat.    Respiratory: Positive for cough.        Objective:      Physical Exam   Constitutional: She is oriented to person, place, and time. She appears well-developed and well-nourished. She is cooperative.  Non-toxic appearance. She does not appear ill. No distress.   HENT:   Head: Normocephalic and atraumatic.   Ears:   Right Ear: Hearing normal.   Left Ear: Hearing normal.   Nose: Congestion present. No mucosal edema, rhinorrhea or nasal deformity. No epistaxis. Right sinus exhibits no maxillary sinus tenderness and no frontal sinus tenderness. Left sinus exhibits no maxillary sinus tenderness and no frontal sinus tenderness.   Mouth/Throat: Uvula is midline, oropharynx is clear and moist and mucous membranes are normal. No trismus in the jaw. Normal dentition. No uvula swelling. No posterior oropharyngeal erythema.   Eyes: Conjunctivae and lids are normal. Right eye exhibits no discharge. Left eye exhibits no discharge. No scleral icterus.   Neck: Trachea normal and phonation normal. Neck supple.   Cardiovascular: Normal rate, regular rhythm, normal  heart sounds, intact distal pulses and normal pulses.   No murmur heard.  Pulmonary/Chest: Effort normal and breath sounds normal. No stridor. No respiratory distress. She has no wheezes. She has no rhonchi. She has no rales.   Abdominal: Normal appearance and bowel sounds are normal. She exhibits no distension and no mass. Soft. There is no abdominal tenderness.   Musculoskeletal: Normal range of motion.         General: No deformity or edema. Normal range of motion.      Cervical back: She exhibits no tenderness.   Lymphadenopathy:     She has no cervical adenopathy.   Neurological: She is alert and oriented to person, place, and time. She exhibits normal muscle tone. Coordination normal.   Skin: Skin is warm, dry, intact, not diaphoretic and not pale.   Psychiatric: She has a normal mood and affect. Her speech is normal and behavior is normal. Judgment and thought content normal.   Nursing note and vitals reviewed.        Assessment:       1. Sore throat    2. COVID-19    3. Cough          Plan:     Counseled patient and answered questions in regards to COVID-19 testing and diagnosis. Quarantine precautions, home care with plenty of fluids and use of OTC meds discussed. Use of pulse oxymeter discussed. Advised the patient to inform the PCP for +ve COVID status. ER precautions discussed. Patient verbalized understanding.        Sore throat  -     POCT COVID-19 Rapid Screening    COVID-19    Cough        COVID-19 Discharge Instructions   About this topic   Coronavirus disease 2019 is also known as COVID-19. It is a viral illness that infects the lungs. It is caused by a virus called SARS-associated coronavirus (SARS-CoV-2).  The signs of COVID-19 most often start a few days after you have been infected. In some people, it takes longer to show signs. Others never show signs of the infection. You may have a cough, fever, shaking chills and it may be hard to breathe. You may be very tired, have muscle aches, a  headache or sore throat. Some people have an upset stomach or loose stools. Others lose their sense of smell or taste. You may not have these signs all the time and they may come and go while you are sick.  The virus spreads easily through droplets when you talk, sneeze, or cough. You can pass the virus to others when you are talking close together, singing, hugging, sharing food, or shaking hands. Doctors believe the germs also survive on surfaces like tables, door handles, and telephones. However, this is not a common way that COVID-19 spreads. Doctors believe you can also spread the infection even if you dont have any symptoms, but they do not know how that happens. This is why getting vaccinated is one of the best ways to keep you healthy and slow the spread of the virus.  Some people have a mild case of COVID-19 and are able to stay at home and away from others until they feel better. Others may need to be in the hospital if they are very sick. Some people with COVID-19 can have some symptoms for weeks or months. People with COVID-19 must isolate themselves. You can start to be around others when your doctor says it is safe to do so.       What care is needed at home?   · Ask your doctor what you need to do when you go home. Make sure you ask questions if you do not understand what the doctor says.  · Drink lots of water, juice, or broth to replace fluids lost from a fever.  · You may use cool mist humidifiers to help ease congestion and coughing.  · Use 2 to 3 pillows to prop yourself up when you lie down to make it easier to breathe and sleep.  · Do not smoke and do not drink beer, wine, and mixed drinks (alcohol).  · To lower the chance of passing the infection to others, get a COVID-19 vaccine after your infection has resolved.  · If you have not been fully vaccinated:  ? Wear a mask over your mouth and nose if you are around others who are not sick. Cloth masks work best if they have more than one layer  of fabric.  ? Wash your hands often.  ? Stay home in a separate room, if possible, away from others. Only go out to get medical care.  ? Use a separate bathroom if possible.  ? Do not make food for others.  What follow-up care is needed?   · Your doctor may ask you to make visits to the office to check on your progress. Be sure to keep these visits. Make sure you wear a mask at these visits.  · If you can, tell the staff you have COVID-19 ahead of time so they can take extra care to stop the disease from spreading.  · It may take a few weeks before your health returns to normal.  What drugs may be needed?   The doctor may order drugs to:  · Help with breathing  · Help with fever  · Help with swelling in your airways and lungs  · Control coughing  · Ease a sore throat  · Help a runny or stuffy nose  Will physical activity be limited?   You may have to limit your physical activity. Talk to your doctor about the right amount of activity for you. If you have been very sick with COVID-19, it can take some time to get your strength back.  Will there be any other care needed?   Doctors do not know how long you can pass the virus on to others after you are sick. This is why it is important to stay in a separate room, if possible, when you are sick. For now, doctors are giving general guidelines for you to follow after you have been sick. Before you go around other people, you should:  · Be fever free for 24 hours without taking any drugs to lower the fever  · Have no symptoms of cough or shortness of breath  · Wait at least 10 days after first having symptoms or your first positive test, and you need to be symptom free as above. Some experts suggest waiting 20 days if you have had a more severe infection.  Talk with your doctor about getting a COVID-19 vaccine.  What problems could happen?   · Fluid loss. This is dehydration.  · Short-term or long-term lung damage  · Heart problems  · Death  When do I need to call the  doctor?   · You are having so much trouble breathing that you can only say one or two words at a time.  · You need to sit upright at all times to be able to breathe and/or cannot lie down.  · You are very confused or cannot stay awake.  · Your lips or skin start to turn blue or grey.  · You think you might be having a medical emergency. Some examples of medical emergencies are:  ? Severe chest pain.  ? Not able to speak or move normally.  · You have trouble breathing when talking or sitting still.  · You have new shortness of breath.  · You become weak or dizzy.  · You have very dark urine or do not pass urine for more than 8 hours.  · You have new or worsening COVID-19 symptoms like:  ? Fever  ? Cough  ? Feeling very tired  ? Shaking chills  ? Headache  ? Trouble swallowing  ? Throwing up  ? Loose stools  ? Reddish purple spots on your fingers or toes  Teach Back: Helping You Understand   The Teach Back Method helps you understand the information we are giving you. After you talk with the staff, tell them in your own words what you learned. This helps to make sure the staff has described each thing clearly. It also helps to explain things that may have been confusing. Before going home, make sure you can do these:  · I can tell you about my condition.  · I can tell you what may help ease my breathing.  · I can tell you what I can do to help avoid passing the infection to others.  · I can tell you what I will do if I have trouble breathing; feel sleepy or confused; or my fingertips, fingernails, skin, or lips are blue.  Where can I learn more?   Centers for Disease Control and Prevention  https://www.cdc.gov/coronavirus/2019-ncov/about/index.html   Centers for Disease Control and Prevention  https://www.cdc.gov/coronavirus/2019-ncov/hcp/disposition-in-home-patients.html   World Health Organization  https://www.who.int/news-room/q-a-detail/d-s-ljscdpgqnbebn   Last Reviewed Date   2021-10-05  Consumer Information Use  and Disclaimer   This information is not specific medical advice and does not replace information you receive from your health care provider. This is only a brief summary of general information. It does NOT include all information about conditions, illnesses, injuries, tests, procedures, treatments, therapies, discharge instructions or life-style choices that may apply to you. You must talk with your health care provider for complete information about your health and treatment options. This information should not be used to decide whether or not to accept your health care providers advice, instructions or recommendations. Only your health care provider has the knowledge and training to provide advice that is right for you.  Copyright   Copyright © 2021 [employer], [employer] and its affiliates and/or licensors. All rights reserved.

## 2022-01-08 NOTE — LETTER
January 8, 2022      [facility] [place]  [address]  [place] [address]  Phone: [phone]  Fax: [phone]       Patient: Naima Jeffries   YOB: 1970  Date of Visit: 01/08/2022    To Whom It May Concern:    Leda Jeffries  was at [facility] on 01/08/2022. The patient may return to work/school on 1/10/2022 with no restrictions. If you have any questions or concerns, or if I can be of further assistance, please do not hesitate to contact me.    Sincerely,    Ivan Esqueda MD

## 2022-03-07 ENCOUNTER — OFFICE VISIT (OUTPATIENT)
Dept: DERMATOLOGY | Facility: CLINIC | Age: 52
End: 2022-03-07
Payer: COMMERCIAL

## 2022-03-07 DIAGNOSIS — Z12.83 SKIN EXAM, SCREENING FOR CANCER: ICD-10-CM

## 2022-03-07 DIAGNOSIS — R21 RASH: ICD-10-CM

## 2022-03-07 DIAGNOSIS — L72.0 MILIA: Primary | ICD-10-CM

## 2022-03-07 DIAGNOSIS — L81.4 LENTIGO: ICD-10-CM

## 2022-03-07 DIAGNOSIS — T14.8XXA BITES: ICD-10-CM

## 2022-03-07 DIAGNOSIS — L90.5 SCAR: ICD-10-CM

## 2022-03-07 PROCEDURE — 1160F RVW MEDS BY RX/DR IN RCRD: CPT | Mod: CPTII,S$GLB,, | Performed by: DERMATOLOGY

## 2022-03-07 PROCEDURE — 99214 OFFICE O/P EST MOD 30 MIN: CPT | Mod: S$GLB,,, | Performed by: DERMATOLOGY

## 2022-03-07 PROCEDURE — 99214 PR OFFICE/OUTPT VISIT, EST, LEVL IV, 30-39 MIN: ICD-10-PCS | Mod: S$GLB,,, | Performed by: DERMATOLOGY

## 2022-03-07 PROCEDURE — 1159F MED LIST DOCD IN RCRD: CPT | Mod: CPTII,S$GLB,, | Performed by: DERMATOLOGY

## 2022-03-07 PROCEDURE — 1159F PR MEDICATION LIST DOCUMENTED IN MEDICAL RECORD: ICD-10-PCS | Mod: CPTII,S$GLB,, | Performed by: DERMATOLOGY

## 2022-03-07 PROCEDURE — 99999 PR PBB SHADOW E&M-EST. PATIENT-LVL III: ICD-10-PCS | Mod: PBBFAC,,, | Performed by: DERMATOLOGY

## 2022-03-07 PROCEDURE — 1160F PR REVIEW ALL MEDS BY PRESCRIBER/CLIN PHARMACIST DOCUMENTED: ICD-10-PCS | Mod: CPTII,S$GLB,, | Performed by: DERMATOLOGY

## 2022-03-07 PROCEDURE — 99999 PR PBB SHADOW E&M-EST. PATIENT-LVL III: CPT | Mod: PBBFAC,,, | Performed by: DERMATOLOGY

## 2022-03-07 NOTE — PROGRESS NOTES
Subjective:       Patient ID:  Naima Jeffries is a 51 y.o. female who presents for   Chief Complaint   Patient presents with    Skin Check     Tbse     HPI    Review of Systems   Constitutional: Negative for fever, chills and fatigue.   HENT: Negative for sore throat.    Respiratory: Negative for cough.         Objective:    Physical Exam   Constitutional: She appears well-developed and well-nourished. No distress.   Eyes: No conjunctival no injection.   Neurological: She is alert and oriented to person, place, and time. She is not disoriented.   Psychiatric: She has a normal mood and affect.   Skin:   Areas Examined (abnormalities noted in diagram):   Scalp / Hair Palpated and Inspected  Head / Face Inspection Performed  Neck Inspection Performed  Chest / Axilla Inspection Performed  Abdomen Inspection Performed  Back Inspection Performed  RUE Inspected  LUE Inspection Performed  RLE Inspected  LLE Inspection Performed  Nails and Digits Inspection Performed                   Diagram Legend     Erythematous scaling macule/papule c/w actinic keratosis       Vascular papule c/w angioma      Pigmented verrucoid papule/plaque c/w seborrheic keratosis      Yellow umbilicated papule c/w sebaceous hyperplasia      Irregularly shaped tan macule c/w lentigo     1-2 mm smooth white papules consistent with Milia      Movable subcutaneous cyst with punctum c/w epidermal inclusion cyst      Subcutaneous movable cyst c/w pilar cyst      Firm pink to brown papule c/w dermatofibroma      Pedunculated fleshy papule(s) c/w skin tag(s)      Evenly pigmented macule c/w junctional nevus     Mildly variegated pigmented, slightly irregular-bordered macule c/w mildly atypical nevus      Flesh colored to evenly pigmented papule c/w intradermal nevus       Pink pearly papule/plaque c/w basal cell carcinoma      Erythematous hyperkeratotic cursted plaque c/w SCC      Surgical scar with no sign of skin cancer recurrence      Open and closed  comedones      Inflammatory papules and pustules      Verrucoid papule consistent consistent with wart     Erythematous eczematous patches and plaques     Dystrophic onycholytic nail with subungual debris c/w onychomycosis     Umbilicated papule    Erythematous-base heme-crusted tan verrucoid plaque consistent with inflamed seborrheic keratosis     Erythematous Silvery Scaling Plaque c/w Psoriasis     See annotation      Assessment / Plan:        Milia  Discussed with patient the benign nature of these lesions and that no treatment is indicated.    Chronic nature of this condition discussed with patient.  Prn cosmetic hyfrecation of milia on the face and around eyes.  Costs $300.  Scar and recurrence reviewed.  Also consider consult with dr. Samaniego.    Rash  -     Ambulatory referral/consult to Dermatology  None noted today.    Lentigo  Discussed with patient the etiology and pathogenesis of the disease or skin lesion(s) and possible treatments and aggravators.    Patient instructed in importance in daily sun protection. Sun avoidance and topical protection discussed.     Patient encouraged to wear hat for all outdoor exposure.     Also discussed sun protective clothing.  Offered hq4% but pt wants laser tx.  Patient to consider consultation with [facility] for lasers and light.    Skin exam, screening for cancer  No other seriously suspicious lesions noted for body areas examined today.  Patient to inform of us if they notice any dark or changing or suspicious spots in areas not examined today.  Follow up for routine monitoring recommended to patient.    Instructed patient to watch out for dark spots, bleeding spots, crusty spots, sores that break out repeatedly in the same spot.  These characteristics are risk factors for skin cancer, and patient is to notify us if they experience any of these symptoms.    Scar  Discussed with the patient the risk of color scars, erythema, or hyperpigmentation that could take  months to resolve.    Bites  This is suspected.  Discussed with patient that clinical presentation and lesions are consistent with insect bites.  Insect bites can cause itching and persistent lesions for weeks due to a person's sensitivity and immune system.  Spots can appear in a delayed fashion out of sync with each other over weeks.  Other members of the household may not have any signs.  We cannot tell what type of insect is causing the problem, and the patient may need to review their household environment for bed bugs, ants, fleas, gnats, et cetera.             Follow up in about 1 year (around 3/7/2023).

## 2022-03-07 NOTE — PATIENT INSTRUCTIONS
Patient instructed in importance in daily sun protection. Sun avoidance and topical protection discussed.     Patient encouraged to wear hat for all outdoor exposure.     Also discussed sun protective clothing.     Recommend sun block with plain zinc oxide or sun block products with some zinc or titanium as their base with an spf of at least 30 to be applied every 2-3 hours of outdoor exposure.

## 2022-03-11 ENCOUNTER — OFFICE VISIT (OUTPATIENT)
Dept: OBSTETRICS AND GYNECOLOGY | Facility: CLINIC | Age: 52
End: 2022-03-11
Payer: COMMERCIAL

## 2022-03-11 VITALS
BODY MASS INDEX: 25.08 KG/M2 | DIASTOLIC BLOOD PRESSURE: 86 MMHG | SYSTOLIC BLOOD PRESSURE: 118 MMHG | HEIGHT: 67 IN | WEIGHT: 159.81 LBS

## 2022-03-11 DIAGNOSIS — N89.8 VAGINAL IRRITATION: Primary | ICD-10-CM

## 2022-03-11 PROCEDURE — 3008F BODY MASS INDEX DOCD: CPT | Mod: CPTII,S$GLB,, | Performed by: OBSTETRICS & GYNECOLOGY

## 2022-03-11 PROCEDURE — 99999 PR PBB SHADOW E&M-EST. PATIENT-LVL III: ICD-10-PCS | Mod: PBBFAC,,, | Performed by: OBSTETRICS & GYNECOLOGY

## 2022-03-11 PROCEDURE — 3074F SYST BP LT 130 MM HG: CPT | Mod: CPTII,S$GLB,, | Performed by: OBSTETRICS & GYNECOLOGY

## 2022-03-11 PROCEDURE — 99213 PR OFFICE/OUTPT VISIT, EST, LEVL III, 20-29 MIN: ICD-10-PCS | Mod: S$GLB,,, | Performed by: OBSTETRICS & GYNECOLOGY

## 2022-03-11 PROCEDURE — 99999 PR PBB SHADOW E&M-EST. PATIENT-LVL III: CPT | Mod: PBBFAC,,, | Performed by: OBSTETRICS & GYNECOLOGY

## 2022-03-11 PROCEDURE — 3079F DIAST BP 80-89 MM HG: CPT | Mod: CPTII,S$GLB,, | Performed by: OBSTETRICS & GYNECOLOGY

## 2022-03-11 PROCEDURE — 3008F PR BODY MASS INDEX (BMI) DOCUMENTED: ICD-10-PCS | Mod: CPTII,S$GLB,, | Performed by: OBSTETRICS & GYNECOLOGY

## 2022-03-11 PROCEDURE — 87481 CANDIDA DNA AMP PROBE: CPT | Mod: 59 | Performed by: OBSTETRICS & GYNECOLOGY

## 2022-03-11 PROCEDURE — 99213 OFFICE O/P EST LOW 20 MIN: CPT | Mod: S$GLB,,, | Performed by: OBSTETRICS & GYNECOLOGY

## 2022-03-11 PROCEDURE — 1159F MED LIST DOCD IN RCRD: CPT | Mod: CPTII,S$GLB,, | Performed by: OBSTETRICS & GYNECOLOGY

## 2022-03-11 PROCEDURE — 3074F PR MOST RECENT SYSTOLIC BLOOD PRESSURE < 130 MM HG: ICD-10-PCS | Mod: CPTII,S$GLB,, | Performed by: OBSTETRICS & GYNECOLOGY

## 2022-03-11 PROCEDURE — 3079F PR MOST RECENT DIASTOLIC BLOOD PRESSURE 80-89 MM HG: ICD-10-PCS | Mod: CPTII,S$GLB,, | Performed by: OBSTETRICS & GYNECOLOGY

## 2022-03-11 PROCEDURE — 87591 N.GONORRHOEAE DNA AMP PROB: CPT | Performed by: OBSTETRICS & GYNECOLOGY

## 2022-03-11 PROCEDURE — 1159F PR MEDICATION LIST DOCUMENTED IN MEDICAL RECORD: ICD-10-PCS | Mod: CPTII,S$GLB,, | Performed by: OBSTETRICS & GYNECOLOGY

## 2022-03-11 PROCEDURE — 87491 CHLMYD TRACH DNA AMP PROBE: CPT | Mod: 59 | Performed by: OBSTETRICS & GYNECOLOGY

## 2022-03-11 RX ORDER — FLUCONAZOLE 150 MG/1
150 TABLET ORAL DAILY
Qty: 3 TABLET | Refills: 2 | Status: SHIPPED | OUTPATIENT
Start: 2022-03-11 | End: 2022-03-14

## 2022-03-11 NOTE — PROGRESS NOTES
History & Physical  Gynecology      SUBJECTIVE:     Chief Complaint: vaginal burning (Pt has been having vaginal burning and itching  since yesterday. Pt says that she is sexual active and had intercourse  /)       History of Present Illness:  Pt is a 50 y/o that presents with complaints of vaginal burning and itching that started yesterday.  Recently had unprotected intercourse ().  No bleeding.  Has not noticed any discharge.  Irritation is both inside and outside.  No hx of STDs.       Review of patient's allergies indicates:  No Known Allergies    Past Medical History:   Diagnosis Date    Anxiety     Depression     Fever blister     S/P D&C (status post dilation and curettage)      Past Surgical History:   Procedure Laterality Date    BREAST BIOPSY Right 2018    benign    EXCISION OF PTERYGIUM Right      OB History        4    Para   2    Term   1       1    AB   2    Living   2       SAB   1    IAB        Ectopic        Multiple        Live Births   2               Family History   Problem Relation Age of Onset    Stroke Mother     Breast cancer Paternal Aunt     Breast cancer Paternal Grandfather     Breast cancer Cousin     Colon cancer Neg Hx     Ovarian cancer Neg Hx      Social History     Tobacco Use    Smoking status: Never Smoker    Smokeless tobacco: Never Used   Substance Use Topics    Alcohol use: Yes     Comment: socially    Drug use: Never       Current Outpatient Medications   Medication Sig    EScitalopram oxalate (LEXAPRO) 10 MG tablet Take 1 tablet (10 mg total) by mouth once daily.    estradiol-norethindrone (COMBIPATCH) 0.05-0.25 mg/24 hr Every Monday/Thursday (Patient not taking: Reported on 3/7/2022)    fluconazole (DIFLUCAN) 150 MG Tab Take 1 tablet (150 mg total) by mouth once daily. for 3 days    meloxicam (MOBIC) 15 MG tablet Take 1 tablet (15 mg total) by mouth once daily. (Patient not taking: Reported on 3/7/2022)     nystatin-triamcinolone (MYCOLOG II) cream Apply topically 2 (two) times daily. (Patient not taking: Reported on 3/7/2022)     No current facility-administered medications for this visit.         Review of Systems:  Review of Systems   Constitutional: Negative for activity change, appetite change, chills, fatigue, fever and unexpected weight change.   Respiratory: Negative for cough, shortness of breath and wheezing.    Cardiovascular: Negative for chest pain and leg swelling.   Gastrointestinal: Negative for abdominal pain, constipation, diarrhea, nausea and vomiting.   Endocrine: Negative for hair loss and hot flashes.   Genitourinary: Positive for vaginal discharge and vaginal pain (vaginal irritation). Negative for decreased libido, dyspareunia, dysuria, frequency, menstrual problem, pelvic pain and vaginal bleeding.   Integumentary:  Negative for acne, hair changes, nipple discharge and breast skin changes.   Neurological: Negative for headaches.   Psychiatric/Behavioral: Negative for sleep disturbance.   Breast: Negative for mastodynia, nipple discharge and skin changes       OBJECTIVE:     Physical Exam:  Physical Exam  Constitutional:       Appearance: She is well-developed.   HENT:      Head: Normocephalic and atraumatic.   Eyes:      General: No scleral icterus.        Right eye: No discharge.         Left eye: No discharge.      Conjunctiva/sclera: Conjunctivae normal.   Neck:      Thyroid: No thyromegaly.   Cardiovascular:      Rate and Rhythm: Normal rate.   Pulmonary:      Effort: Pulmonary effort is normal.      Breath sounds: No stridor.   Abdominal:      General: There is no distension.      Palpations: Abdomen is soft.      Tenderness: There is no abdominal tenderness.   Genitourinary:     Labia:         Right: No rash, tenderness, lesion or injury.         Left: No rash, tenderness, lesion or injury.       Vagina: Vaginal discharge present. No bleeding.      Cervix: No cervical motion tenderness,  discharge or friability.      Uterus: Not enlarged and not tender.       Adnexa:         Right: No mass, tenderness or fullness.          Left: No mass, tenderness or fullness.        Comments: Normal external genitalia.  Normal hair distribution.  Urethral meatus normal. No cervical lesions or masses. Copious thick, white vaginal discharge. No vaginal bleeding noted.  No adnexal or uterine tenderness.  No palpable adnexal masses.  Musculoskeletal:         General: Normal range of motion.   Skin:     General: Skin is warm and dry.   Neurological:      Mental Status: She is alert and oriented to person, place, and time.   Psychiatric:         Behavior: Behavior normal.         Thought Content: Thought content normal.         Judgment: Judgment normal.           ASSESSMENT:       ICD-10-CM ICD-9-CM    1. Vaginal irritation  N89.8 623.9 Vaginosis Screen by DNA Probe      C. trachomatis/N. gonorrhoeae by AMP DNA      fluconazole (DIFLUCAN) 150 MG Tab          Plan:      Naima was seen today for vaginal burning.    Diagnoses and all orders for this visit:    Vaginal irritation  - Clinically looks like yeast  - Will get affirm and GC/CT to assess for STDs  - Will treat with diflucan  -     Vaginosis Screen by DNA Probe  -     C. trachomatis/N. gonorrhoeae by AMP DNA  -     fluconazole (DIFLUCAN) 150 MG Tab; Take 1 tablet (150 mg total) by mouth once daily. for 3 days        Orders Placed This Encounter   Procedures    Vaginosis Screen by DNA Probe    C. trachomatis/N. gonorrhoeae by AMP DNA       Follow up if symptoms worsen or fail to improve.     Face to Face time with patient: 15 min    20 minutes of total time spent on the encounter, which includes face to face time and non-face to face time preparing to see the patient (eg, review of tests), Obtaining and/or reviewing separately obtained history, Documenting clinical information in the electronic or other health record, Independently interpreting results (not  separately reported) and communicating results to the patient/family/caregiver, or Care coordination (not separately reported).      Nini Hernández

## 2022-03-15 LAB
BACTERIAL VAGINOSIS DNA: NEGATIVE
CANDIDA GLABRATA DNA: NEGATIVE
CANDIDA KRUSEI DNA: NEGATIVE
CANDIDA RRNA VAG QL PROBE: POSITIVE
T VAGINALIS RRNA GENITAL QL PROBE: NEGATIVE

## 2022-03-16 LAB
C TRACH DNA SPEC QL NAA+PROBE: NOT DETECTED
N GONORRHOEA DNA SPEC QL NAA+PROBE: NOT DETECTED

## 2022-05-03 ENCOUNTER — PATIENT MESSAGE (OUTPATIENT)
Dept: RESEARCH | Facility: HOSPITAL | Age: 52
End: 2022-05-03
Payer: COMMERCIAL

## 2022-05-24 ENCOUNTER — TELEPHONE (OUTPATIENT)
Dept: ENDOSCOPY | Facility: HOSPITAL | Age: 52
End: 2022-05-24
Payer: COMMERCIAL

## 2022-05-24 DIAGNOSIS — Z12.11 COLON CANCER SCREENING: Primary | ICD-10-CM

## 2022-05-24 RX ORDER — POLYETHYLENE GLYCOL 3350, SODIUM SULFATE ANHYDROUS, SODIUM BICARBONATE, SODIUM CHLORIDE, POTASSIUM CHLORIDE 236; 22.74; 6.74; 5.86; 2.97 G/4L; G/4L; G/4L; G/4L; G/4L
4 POWDER, FOR SOLUTION ORAL ONCE
Qty: 4000 ML | Refills: 0 | Status: SHIPPED | OUTPATIENT
Start: 2022-05-24 | End: 2022-05-24

## 2022-06-07 ENCOUNTER — OFFICE VISIT (OUTPATIENT)
Dept: OBSTETRICS AND GYNECOLOGY | Facility: CLINIC | Age: 52
End: 2022-06-07
Payer: COMMERCIAL

## 2022-06-07 ENCOUNTER — LAB VISIT (OUTPATIENT)
Dept: LAB | Facility: HOSPITAL | Age: 52
End: 2022-06-07
Attending: OBSTETRICS & GYNECOLOGY
Payer: COMMERCIAL

## 2022-06-07 VITALS
DIASTOLIC BLOOD PRESSURE: 77 MMHG | WEIGHT: 158.31 LBS | SYSTOLIC BLOOD PRESSURE: 116 MMHG | BODY MASS INDEX: 24.85 KG/M2 | HEIGHT: 67 IN

## 2022-06-07 DIAGNOSIS — N95.0 POSTMENOPAUSAL BLEEDING: ICD-10-CM

## 2022-06-07 DIAGNOSIS — N89.8 VAGINAL DISCHARGE: Primary | ICD-10-CM

## 2022-06-07 LAB
ESTRADIOL SERPL-MCNC: 10 PG/ML
FSH SERPL-ACNC: 23.79 MIU/ML

## 2022-06-07 PROCEDURE — 3078F DIAST BP <80 MM HG: CPT | Mod: CPTII,S$GLB,, | Performed by: OBSTETRICS & GYNECOLOGY

## 2022-06-07 PROCEDURE — 83001 ASSAY OF GONADOTROPIN (FSH): CPT | Performed by: OBSTETRICS & GYNECOLOGY

## 2022-06-07 PROCEDURE — 99213 PR OFFICE/OUTPT VISIT, EST, LEVL III, 20-29 MIN: ICD-10-PCS | Mod: S$GLB,,, | Performed by: OBSTETRICS & GYNECOLOGY

## 2022-06-07 PROCEDURE — 3008F PR BODY MASS INDEX (BMI) DOCUMENTED: ICD-10-PCS | Mod: CPTII,S$GLB,, | Performed by: OBSTETRICS & GYNECOLOGY

## 2022-06-07 PROCEDURE — 36415 COLL VENOUS BLD VENIPUNCTURE: CPT | Mod: PN | Performed by: OBSTETRICS & GYNECOLOGY

## 2022-06-07 PROCEDURE — 99999 PR PBB SHADOW E&M-EST. PATIENT-LVL III: ICD-10-PCS | Mod: PBBFAC,,, | Performed by: OBSTETRICS & GYNECOLOGY

## 2022-06-07 PROCEDURE — 99999 PR PBB SHADOW E&M-EST. PATIENT-LVL III: CPT | Mod: PBBFAC,,, | Performed by: OBSTETRICS & GYNECOLOGY

## 2022-06-07 PROCEDURE — 3074F PR MOST RECENT SYSTOLIC BLOOD PRESSURE < 130 MM HG: ICD-10-PCS | Mod: CPTII,S$GLB,, | Performed by: OBSTETRICS & GYNECOLOGY

## 2022-06-07 PROCEDURE — 1159F MED LIST DOCD IN RCRD: CPT | Mod: CPTII,S$GLB,, | Performed by: OBSTETRICS & GYNECOLOGY

## 2022-06-07 PROCEDURE — 87481 CANDIDA DNA AMP PROBE: CPT | Mod: 59 | Performed by: OBSTETRICS & GYNECOLOGY

## 2022-06-07 PROCEDURE — 82670 ASSAY OF TOTAL ESTRADIOL: CPT | Performed by: OBSTETRICS & GYNECOLOGY

## 2022-06-07 PROCEDURE — 3008F BODY MASS INDEX DOCD: CPT | Mod: CPTII,S$GLB,, | Performed by: OBSTETRICS & GYNECOLOGY

## 2022-06-07 PROCEDURE — 1160F RVW MEDS BY RX/DR IN RCRD: CPT | Mod: CPTII,S$GLB,, | Performed by: OBSTETRICS & GYNECOLOGY

## 2022-06-07 PROCEDURE — 1159F PR MEDICATION LIST DOCUMENTED IN MEDICAL RECORD: ICD-10-PCS | Mod: CPTII,S$GLB,, | Performed by: OBSTETRICS & GYNECOLOGY

## 2022-06-07 PROCEDURE — 1160F PR REVIEW ALL MEDS BY PRESCRIBER/CLIN PHARMACIST DOCUMENTED: ICD-10-PCS | Mod: CPTII,S$GLB,, | Performed by: OBSTETRICS & GYNECOLOGY

## 2022-06-07 PROCEDURE — 99213 OFFICE O/P EST LOW 20 MIN: CPT | Mod: S$GLB,,, | Performed by: OBSTETRICS & GYNECOLOGY

## 2022-06-07 PROCEDURE — 3074F SYST BP LT 130 MM HG: CPT | Mod: CPTII,S$GLB,, | Performed by: OBSTETRICS & GYNECOLOGY

## 2022-06-07 PROCEDURE — 3078F PR MOST RECENT DIASTOLIC BLOOD PRESSURE < 80 MM HG: ICD-10-PCS | Mod: CPTII,S$GLB,, | Performed by: OBSTETRICS & GYNECOLOGY

## 2022-06-07 PROCEDURE — 87801 DETECT AGNT MULT DNA AMPLI: CPT | Performed by: OBSTETRICS & GYNECOLOGY

## 2022-06-07 NOTE — PROGRESS NOTES
Chief Complaint   Patient presents with    gyn follow up       HPI:   51 y.o.  here today for GYN follow up. Today she reports low back pain, vaginal itching, odor, irritation, and has been noticing vaginal discharge for the past few months. Also with irregular vaginal bleeding/spotting. Also notes occasional bleeding after sex, has happened four or five times. No pelvic pain. Partner tested for STIs. When I previously saw her, recommended switching from OCPs to HRT. Declined intravaginal estrogen despite not having bothersome vasomotor symptoms of menopause.      Was seen 3/2022 and treated for yeast. Notes the symptoms are similar.     LMP Dates from Last 1 Encounters:   LMP: 2022     Labs / Significant Studies  Pap (2021): NILM/HPV neg    Office Visit on 2022   Component Date Value Ref Range Status    Trichomonas vaginalis 2022 Negative  Negative Final    Candida sp 2022 Positive (A) Negative Final    Comment: Camelia species group includes: Candida albicans, Candida tropicalis,  Candida parapsiolosis, Camelia dubliniensis      Camelia glabrata DNA 2022 Negative  Negative Final    Camelia krusei DNA 2022 Negative  Negative Final    Bacterial vaginosis DNA 2022 Negative  Negative Final    Chlamydia, Amplified DNA 2022 Not Detected  Not Detected Final    N gonorrhoeae, amplified DNA 2022 Not Detected  Not Detected Final        Past Medical History:   Diagnosis Date    Anxiety     Depression     Fever blister     S/P D&C (status post dilation and curettage)      Past Surgical History:   Procedure Laterality Date    BREAST BIOPSY Right 2018    benign    EXCISION OF PTERYGIUM Right        Current Outpatient Medications:     EScitalopram oxalate (LEXAPRO) 10 MG tablet, Take 1 tablet (10 mg total) by mouth once daily., Disp: 90 tablet, Rfl: 3  Review of patient's allergies indicates:  No Known Allergies  OB History    Para Term   AB Living   4 2 1 1 2 2   SAB IAB Ectopic Multiple Live Births   1       2      # Outcome Date GA Lbr Solomon/2nd Weight Sex Delivery Anes PTL Lv   4 Term /15    M Vag-Spont   JOE   3 SAB      SAB      2  09/10/11 35w0d   M Vag-Spont   JOE   1 AB              Social History     Tobacco Use    Smoking status: Never Smoker    Smokeless tobacco: Never Used   Substance Use Topics    Alcohol use: Yes     Comment: socially    Drug use: Never     Family History   Problem Relation Age of Onset    Stroke Mother     Breast cancer Paternal Aunt     Breast cancer Paternal Grandfather     Breast cancer Cousin     Colon cancer Neg Hx     Ovarian cancer Neg Hx        Review of Systems   Negative except as in HPI      Physical Exam   Vitals:    22 0911   BP: 116/77     Body mass index is 24.79 kg/m².    Physical Exam  Constitutional:       General: She is not in acute distress.     Appearance: Normal appearance.   Genitourinary:      Vulva, bladder and urethral meatus normal.      No vaginal discharge (no significant discharge seen on exam) or bleeding.        Right Adnexa: not tender, not full and no mass present.     Left Adnexa: not tender, not full and no mass present.     No cervical motion tenderness or lesion.      Uterus is not tender.      No uterine mass detected.     Uterus is anteverted.      Bladder is not tender.       Pelvic exam was performed with patient in the lithotomy position.   HENT:      Head: Normocephalic and atraumatic.   Pulmonary:      Effort: Pulmonary effort is normal.   Abdominal:      General: Bowel sounds are normal. There is no distension.      Palpations: Abdomen is soft.      Tenderness: There is no abdominal tenderness. There is no guarding or rebound.   Musculoskeletal:      Cervical back: Normal range of motion.   Neurological:      General: No focal deficit present.      Mental Status: She is alert and oriented to person, place, and time.   Skin:      General: Skin is warm and dry.   Psychiatric:         Mood and Affect: Mood normal.         Behavior: Behavior normal.         Thought Content: Thought content normal.   Exam conducted with a chaperone present.        Labs reviewed: Vaginosis, GC/CT, Pap, HPV    ASSESSMENT:   Patient Active Problem List   Diagnosis    Situational depression    IUD (intrauterine device) in place    Counseling for hormone replacement therapy    Vaginal discharge       PLAN:  Problem List Items Addressed This Visit        Renal/    Vaginal discharge - Primary    Current Assessment & Plan     Affirm collected today, will treat based on results. Having irregular bleeding and uncertain whether truly postmenopausal, as levels in the past year have varied based on whether or not she was taking OCPs at the time. Will obtain hormone levels (FSH/E2) and TVUS to assess for endometrial thickening. If lis-menopausal, consider treatment with HRT vs vaginal estrogen/Intrarosa based on patient's most bothersome symptoms (currently vaginal atrophy and irritation). If postmenopausal with irregular bleeding, will perform EMBx.            Relevant Orders    Vaginosis Screen by DNA Probe      Other Visit Diagnoses     Postmenopausal bleeding        Relevant Orders    FOLLICLE STIMULATING HORMONE (Completed)    Estradiol (Completed)    US Pelvis Comp with Transvag NON-OB (xpd           Total time spent on this encounter was 20 minutes.  This includes preparing to see the patient;  obtaining/reviewing separately obtained history;  performing a medical exam and/or evaluation;   counseling/educating the patient;  ordering medications, tests, of procedures;   referring/communicating with other health care professionals;  EMR documentation;  interpreting/communicating results to the patient;  and/or care coordination.    Follow up in about 4 weeks (around 7/5/2022) for Virtual Visit/Results.       Jane Collins MD  Department of Obstetrics &  Gynecology  [facility]

## 2022-06-10 ENCOUNTER — PATIENT MESSAGE (OUTPATIENT)
Dept: OBSTETRICS AND GYNECOLOGY | Facility: CLINIC | Age: 52
End: 2022-06-10
Payer: COMMERCIAL

## 2022-06-10 NOTE — ASSESSMENT & PLAN NOTE
Affirm collected today, will treat based on results. Having irregular bleeding and uncertain whether truly postmenopausal, as levels in the past year have varied based on whether or not she was taking OCPs at the time. Will obtain hormone levels (FSH/E2) and TVUS to assess for endometrial thickening. If lis-menopausal, consider treatment with HRT vs vaginal estrogen/Intrarosa based on patient's most bothersome symptoms (currently vaginal atrophy and irritation). If postmenopausal with irregular bleeding, will perform EMBx.

## 2022-06-15 ENCOUNTER — PATIENT MESSAGE (OUTPATIENT)
Dept: OBSTETRICS AND GYNECOLOGY | Facility: CLINIC | Age: 52
End: 2022-06-15
Payer: COMMERCIAL

## 2022-06-15 LAB
BACTERIAL VAGINOSIS DNA: NEGATIVE
CANDIDA GLABRATA DNA: POSITIVE
CANDIDA KRUSEI DNA: NEGATIVE
CANDIDA RRNA VAG QL PROBE: POSITIVE
T VAGINALIS RRNA GENITAL QL PROBE: NEGATIVE

## 2022-06-16 ENCOUNTER — HOSPITAL ENCOUNTER (OUTPATIENT)
Dept: RADIOLOGY | Facility: HOSPITAL | Age: 52
Discharge: HOME | End: 2022-06-16
Attending: OBSTETRICS & GYNECOLOGY
Payer: COMMERCIAL

## 2022-06-16 DIAGNOSIS — N95.0 POSTMENOPAUSAL BLEEDING: ICD-10-CM

## 2022-06-16 DIAGNOSIS — B37.9 CANDIDA GLABRATA INFECTION: Primary | ICD-10-CM

## 2022-06-16 PROCEDURE — 76830 TRANSVAGINAL US NON-OB: CPT | Mod: TC

## 2022-06-16 PROCEDURE — 76856 US EXAM PELVIC COMPLETE: CPT | Mod: 26,,, | Performed by: STUDENT IN AN ORGANIZED HEALTH CARE EDUCATION/TRAINING PROGRAM

## 2022-06-16 PROCEDURE — 76856 US PELVIS COMP WITH TRANSVAG NON-OB (XPD): ICD-10-PCS | Mod: 26,,, | Performed by: STUDENT IN AN ORGANIZED HEALTH CARE EDUCATION/TRAINING PROGRAM

## 2022-06-16 PROCEDURE — 76830 TRANSVAGINAL US NON-OB: CPT | Mod: 26,,, | Performed by: STUDENT IN AN ORGANIZED HEALTH CARE EDUCATION/TRAINING PROGRAM

## 2022-06-16 PROCEDURE — 76830 US PELVIS COMP WITH TRANSVAG NON-OB (XPD): ICD-10-PCS | Mod: 26,,, | Performed by: STUDENT IN AN ORGANIZED HEALTH CARE EDUCATION/TRAINING PROGRAM

## 2022-06-16 RX ORDER — FLUCONAZOLE 150 MG/1
150 TABLET ORAL
Qty: 3 TABLET | Refills: 0 | Status: SHIPPED | OUTPATIENT
Start: 2022-06-16 | End: 2022-06-23

## 2022-06-16 RX ORDER — NYSTATIN 100000 U/G
OINTMENT TOPICAL 2 TIMES DAILY
Qty: 15 G | Refills: 0 | Status: SHIPPED | OUTPATIENT
Start: 2022-06-16 | End: 2022-06-29 | Stop reason: SDUPTHER

## 2022-06-27 ENCOUNTER — TELEPHONE (OUTPATIENT)
Dept: OBSTETRICS AND GYNECOLOGY | Facility: CLINIC | Age: 52
End: 2022-06-27
Payer: COMMERCIAL

## 2022-06-27 NOTE — TELEPHONE ENCOUNTER
----- Message from Chela Maki sent at 6/27/2022  3:19 PM CDT -----  Regarding: Call BAck  Name of Who is Calling:SIVLIA MAE [[id number]]              What is the request in detail:  Patient requesting a call back to move her  virtual appointment from 06- 1:00p to 06- to 12 noon. Please assist              Can the clinic reply by [facility]: No              What Number to Call Back if not in [facility]:[phone]

## 2022-06-29 ENCOUNTER — OFFICE VISIT (OUTPATIENT)
Dept: OBSTETRICS AND GYNECOLOGY | Facility: CLINIC | Age: 52
End: 2022-06-29
Payer: COMMERCIAL

## 2022-06-29 DIAGNOSIS — B37.9 CANDIDA GLABRATA INFECTION: ICD-10-CM

## 2022-06-29 DIAGNOSIS — N95.2 VAGINAL ATROPHY: Primary | ICD-10-CM

## 2022-06-29 PROCEDURE — 99213 PR OFFICE/OUTPT VISIT, EST, LEVL III, 20-29 MIN: ICD-10-PCS | Mod: 95,,, | Performed by: OBSTETRICS & GYNECOLOGY

## 2022-06-29 PROCEDURE — 99213 OFFICE O/P EST LOW 20 MIN: CPT | Mod: 95,,, | Performed by: OBSTETRICS & GYNECOLOGY

## 2022-06-29 PROCEDURE — 1160F RVW MEDS BY RX/DR IN RCRD: CPT | Mod: CPTII,95,, | Performed by: OBSTETRICS & GYNECOLOGY

## 2022-06-29 PROCEDURE — 1159F PR MEDICATION LIST DOCUMENTED IN MEDICAL RECORD: ICD-10-PCS | Mod: CPTII,95,, | Performed by: OBSTETRICS & GYNECOLOGY

## 2022-06-29 PROCEDURE — 1159F MED LIST DOCD IN RCRD: CPT | Mod: CPTII,95,, | Performed by: OBSTETRICS & GYNECOLOGY

## 2022-06-29 PROCEDURE — 1160F PR REVIEW ALL MEDS BY PRESCRIBER/CLIN PHARMACIST DOCUMENTED: ICD-10-PCS | Mod: CPTII,95,, | Performed by: OBSTETRICS & GYNECOLOGY

## 2022-06-29 RX ORDER — NYSTATIN 100000 U/G
OINTMENT TOPICAL 2 TIMES DAILY
Qty: 15 G | Refills: 0 | Status: SHIPPED | OUTPATIENT
Start: 2022-06-29 | End: 2023-03-16

## 2022-06-29 RX ORDER — PRASTERONE 6.5 MG/1
6.5 INSERT VAGINAL NIGHTLY
Qty: 28 EACH | Refills: 11 | Status: SHIPPED | OUTPATIENT
Start: 2022-06-29 | End: 2023-03-16

## 2022-06-29 NOTE — ASSESSMENT & PLAN NOTE
No concern for postmenopausal bleeding, thin endometrial stripe and perimenopausal hormone levels. Discussed treating candida glabrata infection to reduce inflammation as well as use of intravaginal estrogen vs Intrarosa. Previously prescribed Intrarosa by Anne Gonzalez and still has some at home. Another Rx was sent to pharmacy today for Nystatin and Intrarosa. Patient to use for several months and let me know if symptoms do not improve. Would repeat exam and consider endometrial biopsy. Discussed postmenopausal status with >12 months without a menstrual cycle.

## 2022-06-29 NOTE — PROGRESS NOTES
The patient location is: [facility]  The chief complaint leading to consultation is:   Visit type: audiovisual  Total time spent with patient: 10 minutes    Each patient to whom he or she provides medical services by telemedicine is:  (1) informed of the relationship between the physician and patient and the respective role of any other health care provider with respect to management of the patient; and (2) notified that he or she may decline to receive medical services by telemedicine and may withdraw from such care at any time.      Chief Complaint: Discuss results     HPI:      Naima Jeffries is a 51 y.o.  who presents via virtual visit to discuss results. Was seen earlier this month c/o vaginal irritation and bleeding during and after intercourse. Workup showed normal EMS (1 mm), perimenopausal hormone levels, and candida glabrata infection. She is not complaining of vaginal discharge or odor. No cycle for at least the past six months but she is unsure exactly when. Denies hot flashes, night sweats, insomnia, or mood changes. No other new complaints or concerns today.    Recently got back from [place] visiting friends, hiking and outdoor activities.       ROS:     GENERAL: Denies fevers or chills. Feeling well overall.   ABDOMEN: Denies abdominal pain, constipation, diarrhea, nausea, vomiting, change in appetite.   URINARY: Denies frequency, dysuria, hematuria.  GYNECOLOGIC: See HPI.  NEUROLOGIC: Denies syncope or weakness.     Physical Exam:      PHYSICAL EXAM:  There were no vitals taken for this visit.  There is no height or weight on file to calculate BMI.     APPEARANCE: Well nourished, well developed, in no acute distress.  Exam deferred due to televisit    Results:      FSH: 23  E2: 10  TVUS (22): Uterus 7x4x3 cm, EMS 1 mm, LOV with a minimally complex cystic structure measuring 1.5 cm and two additional simple cystic structures adjacent to the ovary in the left adnexa, probable para-ovarian  cysts.   Affirm: Camelia Glabrata  Pap (8/2021): NILM/HPV neg    Assessment/Plan:     Problem List Items Addressed This Visit        Renal/    Vaginal atrophy - Primary    Current Assessment & Plan     No concern for postmenopausal bleeding, thin endometrial stripe and perimenopausal hormone levels. Discussed treating candida glabrata infection to reduce inflammation as well as use of intravaginal estrogen vs Intrarosa. Previously prescribed Intrarosa by Anne Gonzalez and still has some at home. Another Rx was sent to pharmacy today for Nystatin and Intrarosa. Patient to use for several months and let me know if symptoms do not improve. Would repeat exam and consider endometrial biopsy. Discussed postmenopausal status with >12 months without a menstrual cycle.            Relevant Medications    prasterone, dhea, (INTRAROSA) 6.5 mg Inst       ID    Candida glabrata infection    Relevant Medications    nystatin (MYCOSTATIN) ointment          Counseling:       Use of the [facility] Patient Portal discussed and encouraged during today's visit.       Jane Collins MD  Department of Obstetrics & Gynecology  [facility]

## 2022-07-25 ENCOUNTER — TELEPHONE (OUTPATIENT)
Dept: ENDOSCOPY | Facility: HOSPITAL | Age: 52
End: 2022-07-25
Payer: COMMERCIAL

## 2022-07-25 NOTE — TELEPHONE ENCOUNTER
Returning patient's voicemail to endoscopy scheduling. No answer. Left message for patient to call Endoscopy Scheduling.  Phone number provided.

## 2022-10-06 DIAGNOSIS — Z12.31 OTHER SCREENING MAMMOGRAM: ICD-10-CM

## 2022-10-10 ENCOUNTER — PATIENT MESSAGE (OUTPATIENT)
Dept: ADMINISTRATIVE | Facility: HOSPITAL | Age: 52
End: 2022-10-10
Payer: COMMERCIAL

## 2022-10-22 DIAGNOSIS — Z12.11 SCREEN FOR COLON CANCER: Primary | ICD-10-CM

## 2022-11-09 ENCOUNTER — HOSPITAL ENCOUNTER (OUTPATIENT)
Facility: HOSPITAL | Age: 52
Discharge: HOME | End: 2022-11-09
Attending: INTERNAL MEDICINE | Admitting: INTERNAL MEDICINE
Payer: COMMERCIAL

## 2022-11-09 ENCOUNTER — ANESTHESIA (OUTPATIENT)
Dept: ENDOSCOPY | Facility: HOSPITAL | Age: 52
End: 2022-11-09
Payer: COMMERCIAL

## 2022-11-09 ENCOUNTER — ANESTHESIA EVENT (OUTPATIENT)
Dept: ENDOSCOPY | Facility: HOSPITAL | Age: 52
End: 2022-11-09
Payer: COMMERCIAL

## 2022-11-09 VITALS
OXYGEN SATURATION: 99 % | DIASTOLIC BLOOD PRESSURE: 72 MMHG | RESPIRATION RATE: 16 BRPM | TEMPERATURE: 98 F | HEART RATE: 56 BPM | HEIGHT: 67 IN | SYSTOLIC BLOOD PRESSURE: 123 MMHG | WEIGHT: 155 LBS | BODY MASS INDEX: 24.33 KG/M2

## 2022-11-09 DIAGNOSIS — Z12.11 SCREENING FOR COLON CANCER: ICD-10-CM

## 2022-11-09 DIAGNOSIS — Z12.11 COLON CANCER SCREENING: Primary | ICD-10-CM

## 2022-11-09 LAB
B-HCG UR QL: NEGATIVE
CTP QC/QA: YES

## 2022-11-09 PROCEDURE — 25000003 PHARM REV CODE 250: Performed by: NURSE ANESTHETIST, CERTIFIED REGISTERED

## 2022-11-09 PROCEDURE — 25000003 PHARM REV CODE 250: Performed by: INTERNAL MEDICINE

## 2022-11-09 PROCEDURE — G0121 COLON CA SCRN NOT HI RSK IND: HCPCS | Performed by: INTERNAL MEDICINE

## 2022-11-09 PROCEDURE — E9220 PRA ENDO ANESTHESIA: HCPCS | Mod: ,,, | Performed by: NURSE ANESTHETIST, CERTIFIED REGISTERED

## 2022-11-09 PROCEDURE — G0121 COLON CA SCRN NOT HI RSK IND: ICD-10-PCS | Mod: ,,, | Performed by: INTERNAL MEDICINE

## 2022-11-09 PROCEDURE — 81025 URINE PREGNANCY TEST: CPT | Performed by: INTERNAL MEDICINE

## 2022-11-09 PROCEDURE — 37000008 HC ANESTHESIA 1ST 15 MINUTES: Performed by: INTERNAL MEDICINE

## 2022-11-09 PROCEDURE — 37000009 HC ANESTHESIA EA ADD 15 MINS: Performed by: INTERNAL MEDICINE

## 2022-11-09 PROCEDURE — 63600175 PHARM REV CODE 636 W HCPCS: Performed by: NURSE ANESTHETIST, CERTIFIED REGISTERED

## 2022-11-09 PROCEDURE — E9220 PRA ENDO ANESTHESIA: ICD-10-PCS | Mod: ,,, | Performed by: NURSE ANESTHETIST, CERTIFIED REGISTERED

## 2022-11-09 PROCEDURE — G0121 COLON CA SCRN NOT HI RSK IND: HCPCS | Mod: ,,, | Performed by: INTERNAL MEDICINE

## 2022-11-09 RX ORDER — PROPOFOL 10 MG/ML
VIAL (ML) INTRAVENOUS CONTINUOUS PRN
Status: DISCONTINUED | OUTPATIENT
Start: 2022-11-09 | End: 2022-11-09

## 2022-11-09 RX ORDER — SODIUM CHLORIDE 9 MG/ML
INJECTION, SOLUTION INTRAVENOUS CONTINUOUS
Status: DISCONTINUED | OUTPATIENT
Start: 2022-11-09 | End: 2022-11-09 | Stop reason: HOSPADM

## 2022-11-09 RX ORDER — LIDOCAINE HYDROCHLORIDE 20 MG/ML
INJECTION INTRAVENOUS
Status: DISCONTINUED | OUTPATIENT
Start: 2022-11-09 | End: 2022-11-09

## 2022-11-09 RX ORDER — PROPOFOL 10 MG/ML
VIAL (ML) INTRAVENOUS
Status: DISCONTINUED | OUTPATIENT
Start: 2022-11-09 | End: 2022-11-09

## 2022-11-09 RX ADMIN — PROPOFOL 30 MG: 10 INJECTION, EMULSION INTRAVENOUS at 09:11

## 2022-11-09 RX ADMIN — PROPOFOL 150 MCG/KG/MIN: 10 INJECTION, EMULSION INTRAVENOUS at 09:11

## 2022-11-09 RX ADMIN — SODIUM CHLORIDE: 0.9 INJECTION, SOLUTION INTRAVENOUS at 08:11

## 2022-11-09 RX ADMIN — LIDOCAINE HYDROCHLORIDE 50 MG: 20 INJECTION INTRAVENOUS at 09:11

## 2022-11-09 RX ADMIN — PROPOFOL 70 MG: 10 INJECTION, EMULSION INTRAVENOUS at 09:11

## 2022-11-09 NOTE — H&P
Short Stay Endoscopy History and Physical    PCP - Isela Florian MD    Procedure - Colonoscopy  Sedation: GA  ASA - per anesthesia  Mallampati - per anesthesia  History of Anesthesia problems - no  Family history Anesthesia problems -  no     HPI:  This is a 52 y.o. female here for evaluation of : Screening for CRC    Reflux - no  Dysphagia - no  Abdominal pain - no  Diarrhea - no    ROS:  Constitutional: No fevers, chills, No weight loss  ENT: No allergies  CV: No chest pain  Pulm: No cough, No shortness of breath  Ophtho: No vision changes  GI: see HPI  Medical History:  has a past medical history of Anxiety, Depression, Fever blister, and S/P D&C (status post dilation and curettage).    Surgical History:  has a past surgical history that includes Excision of pterygium (Right); Breast biopsy (Right, 2018); and Colonoscopy (N/A, 7/28/2022).    Family History: family history includes Breast cancer in her cousin, paternal aunt, and paternal grandfather; Stroke in her mother.. Otherwise no colon cancer, inflammatory bowel disease, or GI malignancies.    Social History:  reports that she has never smoked. She has never used smokeless tobacco. She reports current alcohol use. She reports that she does not use drugs.    Review of patient's allergies indicates:  No Known Allergies    Medications:   Medications Prior to Admission   Medication Sig Dispense Refill Last Dose    EScitalopram oxalate (LEXAPRO) 10 MG tablet Take 1 tablet (10 mg total) by mouth once daily. 90 tablet 3 11/8/2022    nystatin (MYCOSTATIN) ointment Apply topically 2 (two) times daily. for 7 days 15 g 0     prasterone, dhea, (INTRAROSA) 6.5 mg Inst Place 6.5 mg vaginally every evening. 28 each 11        Objective Findings:    Vital Signs: Per nursing notes.    Physical Exam:  General Appearance: Well appearing in no acute distress  Head:   Normocephalic, without obvious abnormality  Eyes:    No scleral icterus  Airway: Open  Neck: No restriction  in mobility  Lungs: CTA bilaterally in anterior and posterior fields, no wheezes, no crackles.  Heart:  Regular rate and rhythm, S1, S2 normal, no murmurs heard  Abdomen: Soft, non tender, non distended      Labs:  Lab Results   Component Value Date    WBC 5.31 11/05/2021    HGB 13.4 11/05/2021    HCT 42.7 11/05/2021     11/05/2021    CHOL 217 (H) 11/05/2021    TRIG 62 11/05/2021     (H) 11/05/2021    ALT 13 11/05/2021    AST 21 11/05/2021     11/05/2021    K 4.4 11/05/2021     11/05/2021    CREATININE 0.9 11/05/2021    BUN 13 11/05/2021    CO2 27 11/05/2021    TSH 1.696 11/05/2021    HGBA1C 5.1 11/05/2021         I have explained the risks and benefits of endoscopy procedures to the patient including but not limited to bleeding, perforation, infection, and death.    Thank you so much for allowing me to participate in the care of Naima Rico MD

## 2022-11-09 NOTE — ANESTHESIA PREPROCEDURE EVALUATION
11/09/2022  Pre-operative evaluation for Procedure(s) (LRB):  COLONOSCOPY (N/A)    Naima Jeffries is a 52 y.o. female     Patient Active Problem List   Diagnosis    Situational depression    IUD (intrauterine device) in place    Counseling for hormone replacement therapy    Vaginal discharge    Vaginal atrophy    Candida glabrata infection       Review of patient's allergies indicates:  No Known Allergies    No current facility-administered medications on file prior to encounter.     Current Outpatient Medications on File Prior to Encounter   Medication Sig Dispense Refill    EScitalopram oxalate (LEXAPRO) 10 MG tablet Take 1 tablet (10 mg total) by mouth once daily. 90 tablet 3    nystatin (MYCOSTATIN) ointment Apply topically 2 (two) times daily. for 7 days 15 g 0    prasterone, dhea, (INTRAROSA) 6.5 mg Inst Place 6.5 mg vaginally every evening. 28 each 11       Past Surgical History:   Procedure Laterality Date    BREAST BIOPSY Right 2018    benign    COLONOSCOPY N/A 7/28/2022    Procedure: COLONOSCOPY;  Surgeon: Akua Haider MD;  Location: [address]);  Service: Endoscopy;  Laterality: N/A;  original order from Dr Florian on 11/5/21-unusable. new order placed        vaccinated-GT    EXCISION OF PTERYGIUM Right        Social History     Socioeconomic History    Marital status: Single   Tobacco Use    Smoking status: Never    Smokeless tobacco: Never   Substance and Sexual Activity    Alcohol use: Yes     Comment: socially    Drug use: Never    Sexual activity: Yes         CBC: No results for input(s): WBC, RBC, HGB, HCT, PLT, MCV, MCH, MCHC in the last 72 hours.    CMP: No results for input(s): NA, K, CL, CO2, BUN, CREATININE, GLU, MG, PHOS, CALCIUM, ALBUMIN, PROT, ALKPHOS, ALT, AST, BILITOT in the last 72 hours.    INR  No results for input(s): PT, INR, PROTIME, APTT in  the last 72 hours.        Diagnostic Studies:      EKD Echo:  No results found for this or any previous visit.        Pre-op Assessment    I have reviewed the Patient Summary Reports.     I have reviewed the Nursing Notes. I have reviewed the NPO Status.   I have reviewed the Medications.     Review of Systems  Anesthesia Hx:  No problems with previous Anesthesia  History of prior surgery of interest to airway management or planning: Denies Family Hx of Anesthesia complications.   Denies Personal Hx of Anesthesia complications.   Hematology/Oncology:         -- Denies Anemia:   Cardiovascular:  Cardiovascular Normal     Pulmonary:   Denies COPD.  Denies Asthma.  Denies Sleep Apnea.    Renal/:   Denies Chronic Renal Disease.     Hepatic/GI:   Denies GERD. Denies Liver Disease.    Neurological:  Neurology Normal    Endocrine:   Denies Diabetes.    Psych:   Psychiatric History          Physical Exam  General: Well nourished and Cooperative    Airway:  Mallampati: III / II  Mouth Opening: Normal  TM Distance: Normal  Tongue: Normal  Neck ROM: Normal ROM    Dental:  Intact    Chest/Lungs:  Normal Respiratory Rate, Clear to auscultation    Heart:  Rate: Normal  Rhythm: Regular Rhythm  Sounds: Normal        Anesthesia Plan  Type of Anesthesia, risks & benefits discussed:    Anesthesia Type: Gen Natural Airway  Intra-op Monitoring Plan: Standard ASA Monitors  Post Op Pain Control Plan: multimodal analgesia  Induction:  IV  Informed Consent: Informed consent signed with the Patient and all parties understand the risks and agree with anesthesia plan.  All questions answered.   ASA Score: 1  Day of Surgery Review of History & Physical: H&P Update referred to the surgeon/provider.    Ready For Surgery From Anesthesia Perspective.     .

## 2022-11-09 NOTE — TRANSFER OF CARE
"Anesthesia Transfer of Care Note    Patient: Naima Jeffries    Procedure(s) Performed: Procedure(s) (LRB):  COLONOSCOPY (N/A)    Patient location: GI    Anesthesia Type: general    Transport from OR: Transported from OR on room air with adequate spontaneous ventilation    Post pain: adequate analgesia    Post assessment: no apparent anesthetic complications and tolerated procedure well    Post vital signs: stable    Level of consciousness: awake, alert and oriented    Nausea/Vomiting: no nausea/vomiting    Complications: none    Transfer of care protocol was followed      Last vitals:   Visit Vitals  /74   Pulse 61   Temp 36.6 °C (97.9 °F)   Resp 18   Ht 5' 7" (1.702 m)   Wt 70.3 kg (155 lb)   SpO2 99%   Breastfeeding No   BMI 24.28 kg/m²     "

## 2022-11-09 NOTE — PROVATION PATIENT INSTRUCTIONS
Discharge Summary/Instructions after an Endoscopic Procedure  Patient Name: Naima Jeffries  Patient MRN: [id number]  Patient YOB: 1970 Wednesday, November 9, 2022  Gael Rico MD  Dear patient,  As a result of recent federal legislation (The Federal Cures Act), you may   receive lab or pathology results from your procedure in your [facility]   account before your physician is able to contact you. Your physician or   their representative will relay the results to you with their   recommendations at their soonest availability.  Thank you,  RESTRICTIONS:  During your procedure today, you received medications for sedation.  These   medications may affect your judgment, balance and coordination.  Therefore,   for 24 hours, you have the following restrictions:   - DO NOT drive a car, operate machinery, make legal/financial decisions,   sign important papers or drink alcohol.    ACTIVITY:  Today: no heavy lifting, straining or running due to procedural   sedation/anesthesia.  The following day: return to full activity including work.  DIET:  Eat and drink normally unless instructed otherwise.     TREATMENT FOR COMMON SIDE EFFECTS:  - Mild abdominal pain, nausea, belching, bloating or excessive gas:  rest,   eat lightly and use a heating pad.  - Sore Throat: treat with throat lozenges and/or gargle with warm salt   water.  - Because air was used during the procedure, expelling large amounts of air   from your rectum or belching is normal.  - If a bowel prep was taken, you may not have a bowel movement for 1-3 days.    This is normal.  SYMPTOMS TO WATCH FOR AND REPORT TO YOUR PHYSICIAN:  1. Abdominal pain or bloating, other than gas cramps.  2. Chest pain.  3. Back pain.  4. Signs of infection such as: chills or fever occurring within 24 hours   after the procedure.  5. Rectal bleeding, which would show as bright red, maroon, or black stools.   (A tablespoon of blood from the rectum is not serious, especially  if   hemorrhoids are present.)  6. Vomiting.  7. Weakness or dizziness.  GO DIRECTLY TO THE NEAREST EMERGENCY ROOM IF YOU HAVE ANY OF THE FOLLOWING:      Difficulty breathing              Chills and/or fever over 101 F   Persistent vomiting and/or vomiting blood   Severe abdominal pain   Severe chest pain   Black, tarry stools   Bleeding- more than one tablespoon   Any other symptom or condition that you feel may need urgent attention  Your doctor recommends these additional instructions:  If any biopsies were taken, your doctors clinic will contact you in 1 to 2   weeks with any results.  - Patient has a contact number available for emergencies.  The signs and   symptoms of potential delayed complications were discussed with the   patient.  Return to normal activities tomorrow.  Written discharge   instructions were provided to the patient.   - Discharge patient to home.   - Resume previous diet.   - Continue present medications.   - Repeat colonoscopy in 10 years for screening purposes.   For questions, problems or results please call your physician - Gael Rico MD at Work:  [phone].  [facility], EMERGENCY ROOM PHONE NUMBER: [phone]  IF A COMPLICATION OR EMERGENCY SITUATION ARISES AND YOU ARE UNABLE TO REACH   YOUR PHYSICIAN - GO DIRECTLY TO THE EMERGENCY ROOM.  Gael Rico MD  11/9/2022 9:36:03 AM  This report has been verified and signed electronically.  Dear patient,  As a result of recent federal legislation (The Federal Cures Act), you may   receive lab or pathology results from your procedure in your [facility]   account before your physician is able to contact you. Your physician or   their representative will relay the results to you with their   recommendations at their soonest availability.  Thank you,  PROVATION

## 2022-12-01 ENCOUNTER — APPOINTMENT (OUTPATIENT)
Dept: RADIOLOGY | Facility: OTHER | Age: 52
End: 2022-12-01
Attending: INTERNAL MEDICINE
Payer: COMMERCIAL

## 2022-12-01 VITALS — HEIGHT: 67 IN | WEIGHT: 155 LBS | BODY MASS INDEX: 24.33 KG/M2

## 2022-12-01 DIAGNOSIS — Z12.31 OTHER SCREENING MAMMOGRAM: ICD-10-CM

## 2022-12-01 PROCEDURE — 77067 MAMMO DIGITAL SCREENING BILAT WITH TOMO: ICD-10-PCS | Mod: 26,,, | Performed by: RADIOLOGY

## 2022-12-01 PROCEDURE — 77067 SCR MAMMO BI INCL CAD: CPT | Mod: 26,,, | Performed by: RADIOLOGY

## 2022-12-01 PROCEDURE — 77063 MAMMO DIGITAL SCREENING BILAT WITH TOMO: ICD-10-PCS | Mod: 26,,, | Performed by: RADIOLOGY

## 2022-12-01 PROCEDURE — 77063 BREAST TOMOSYNTHESIS BI: CPT | Mod: 26,,, | Performed by: RADIOLOGY

## 2022-12-01 PROCEDURE — 77067 SCR MAMMO BI INCL CAD: CPT | Mod: TC,PN

## 2022-12-01 PROCEDURE — 77063 BREAST TOMOSYNTHESIS BI: CPT | Mod: TC,PN

## 2023-01-03 NOTE — TELEPHONE ENCOUNTER
Care Due:                  Date            Visit Type   Department     Provider  --------------------------------------------------------------------------------                                MYCHART                              FOLLOWUP/OF  [facility] PRIMARY  Last Visit: 11-      FICE VISIT   CARE           Isela Florian  Next Visit: None Scheduled  None         None Found                                                            Last  Test          Frequency    Reason                     Performed    Due Date  --------------------------------------------------------------------------------    Office Visit  12 months..  EScitalopram.............  11-   10-    [facility] Embedded Care Gaps. Reference number: [id number]. 1/03/2023   5:54:26 PM CST

## 2023-01-05 RX ORDER — ESCITALOPRAM OXALATE 10 MG/1
TABLET ORAL
Qty: 90 TABLET | Refills: 1 | Status: SHIPPED | OUTPATIENT
Start: 2023-01-05 | End: 2023-03-16

## 2023-01-27 ENCOUNTER — PATIENT MESSAGE (OUTPATIENT)
Dept: RESEARCH | Facility: HOSPITAL | Age: 53
End: 2023-01-27
Payer: COMMERCIAL

## 2023-02-02 ENCOUNTER — HOSPITAL ENCOUNTER (OUTPATIENT)
Dept: RADIOLOGY | Facility: HOSPITAL | Age: 53
Discharge: HOME | End: 2023-02-02
Attending: FAMILY MEDICINE
Payer: COMMERCIAL

## 2023-02-02 ENCOUNTER — PATIENT MESSAGE (OUTPATIENT)
Dept: FAMILY MEDICINE | Facility: CLINIC | Age: 53
End: 2023-02-02

## 2023-02-02 ENCOUNTER — OFFICE VISIT (OUTPATIENT)
Dept: FAMILY MEDICINE | Facility: CLINIC | Age: 53
End: 2023-02-02
Payer: COMMERCIAL

## 2023-02-02 VITALS
DIASTOLIC BLOOD PRESSURE: 76 MMHG | WEIGHT: 160.06 LBS | TEMPERATURE: 99 F | HEART RATE: 72 BPM | SYSTOLIC BLOOD PRESSURE: 126 MMHG | HEIGHT: 67 IN | BODY MASS INDEX: 25.12 KG/M2 | OXYGEN SATURATION: 100 %

## 2023-02-02 DIAGNOSIS — R07.81 RIB PAIN ON LEFT SIDE: Primary | ICD-10-CM

## 2023-02-02 DIAGNOSIS — J06.9 VIRAL URI WITH COUGH: ICD-10-CM

## 2023-02-02 DIAGNOSIS — R07.81 RIB PAIN ON LEFT SIDE: ICD-10-CM

## 2023-02-02 PROCEDURE — 1159F PR MEDICATION LIST DOCUMENTED IN MEDICAL RECORD: ICD-10-PCS | Mod: CPTII,S$GLB,, | Performed by: FAMILY MEDICINE

## 2023-02-02 PROCEDURE — 3078F DIAST BP <80 MM HG: CPT | Mod: CPTII,S$GLB,, | Performed by: FAMILY MEDICINE

## 2023-02-02 PROCEDURE — 3074F PR MOST RECENT SYSTOLIC BLOOD PRESSURE < 130 MM HG: ICD-10-PCS | Mod: CPTII,S$GLB,, | Performed by: FAMILY MEDICINE

## 2023-02-02 PROCEDURE — 99999 PR PBB SHADOW E&M-EST. PATIENT-LVL III: ICD-10-PCS | Mod: PBBFAC,,, | Performed by: FAMILY MEDICINE

## 2023-02-02 PROCEDURE — 3078F PR MOST RECENT DIASTOLIC BLOOD PRESSURE < 80 MM HG: ICD-10-PCS | Mod: CPTII,S$GLB,, | Performed by: FAMILY MEDICINE

## 2023-02-02 PROCEDURE — 99214 PR OFFICE/OUTPT VISIT, EST, LEVL IV, 30-39 MIN: ICD-10-PCS | Mod: S$GLB,,, | Performed by: FAMILY MEDICINE

## 2023-02-02 PROCEDURE — 3074F SYST BP LT 130 MM HG: CPT | Mod: CPTII,S$GLB,, | Performed by: FAMILY MEDICINE

## 2023-02-02 PROCEDURE — 1159F MED LIST DOCD IN RCRD: CPT | Mod: CPTII,S$GLB,, | Performed by: FAMILY MEDICINE

## 2023-02-02 PROCEDURE — 3008F BODY MASS INDEX DOCD: CPT | Mod: CPTII,S$GLB,, | Performed by: FAMILY MEDICINE

## 2023-02-02 PROCEDURE — 71100 X-RAY EXAM RIBS UNI 2 VIEWS: CPT | Mod: 26,LT,, | Performed by: RADIOLOGY

## 2023-02-02 PROCEDURE — 71100 X-RAY EXAM RIBS UNI 2 VIEWS: CPT | Mod: TC,FY,LT

## 2023-02-02 PROCEDURE — 99214 OFFICE O/P EST MOD 30 MIN: CPT | Mod: S$GLB,,, | Performed by: FAMILY MEDICINE

## 2023-02-02 PROCEDURE — 3008F PR BODY MASS INDEX (BMI) DOCUMENTED: ICD-10-PCS | Mod: CPTII,S$GLB,, | Performed by: FAMILY MEDICINE

## 2023-02-02 PROCEDURE — 99999 PR PBB SHADOW E&M-EST. PATIENT-LVL III: CPT | Mod: PBBFAC,,, | Performed by: FAMILY MEDICINE

## 2023-02-02 PROCEDURE — 71100 XR RIBS 2 VIEW LEFT: ICD-10-PCS | Mod: 26,LT,, | Performed by: RADIOLOGY

## 2023-02-02 RX ORDER — METHOCARBAMOL 500 MG/1
500 TABLET, FILM COATED ORAL NIGHTLY PRN
Qty: 15 TABLET | Refills: 0 | Status: SHIPPED | OUTPATIENT
Start: 2023-02-02 | End: 2023-02-12

## 2023-02-02 RX ORDER — PROMETHAZINE HYDROCHLORIDE AND DEXTROMETHORPHAN HYDROBROMIDE 6.25; 15 MG/5ML; MG/5ML
5 SYRUP ORAL EVERY 4 HOURS PRN
Qty: 118 ML | Refills: 0 | Status: SHIPPED | OUTPATIENT
Start: 2023-02-02 | End: 2023-02-12

## 2023-02-02 NOTE — PROGRESS NOTES
(Portions of this note were dictated using voice recognition software and may contain dictation related errors in spelling/grammar/syntax not found on text review)    CC:   Chief Complaint   Patient presents with    Rib Injury     85 lb son walked on her back and she moved and heard a crack and her lower left rib hurts    Sore Throat    Nasal Congestion       HPI: 52 y.o. female, pt of Dr Florian, presented with rib pain and sinus symptoms for urgent care visit, she is new to me. She has medical history significant for anxiety, depression, fever blister. She reports she had back pain yesterday and asked her 7 year old son (85 lbs) to walk on her back, she was laying on her stomach, she moved while he was walking and heard a pop on left rib, ever since had pain in the left rib area, aggravates with movement and laying on that side, also has cough which causes increased pain, describes as sharp pain and achiness, non radiating. She has cold symptoms since last Friday, reports productive cough with yellow colored sputum, has runny nose, throat scratchiness and sinus headache, no fever, chills, body aches, ear symptoms, shortness of breath. She has 2 school going kids who has same symptoms. No other concerns.     Past Medical History:   Diagnosis Date    Anxiety     Depression     Fever blister     S/P D&C (status post dilation and curettage)        Past Surgical History:   Procedure Laterality Date    BREAST BIOPSY Right 2018    benign    COLONOSCOPY N/A 7/28/2022    Procedure: COLONOSCOPY;  Surgeon: Akua Haider MD;  Location: [facility] ([address] [place] [address]);  Service: Endoscopy;  Laterality: N/A;  original order from Dr Florian on 11/5/21-unusable. new order placed        vaccinated-GT    COLONOSCOPY N/A 11/9/2022    Procedure: COLONOSCOPY;  Surgeon: Gael Rico MD;  [facility]  Service: Endoscopy;  Laterality: N/A;  old case request unusable, new case request placed  fully vaccinated,  instructions emailed to [email or web address]  golytely. pre call complete A.s    EXCISION OF PTERYGIUM Right        Family History   Problem Relation Age of Onset    Stroke Mother     Breast cancer Paternal Aunt     Breast cancer Cousin     Colon cancer Neg Hx     Ovarian cancer Neg Hx        Social History     Tobacco Use    Smoking status: Never    Smokeless tobacco: Never   Substance Use Topics    Alcohol use: Yes     Comment: socially    Drug use: Never       Lab Results   Component Value Date    WBC 5.31 11/05/2021    HGB 13.4 11/05/2021    HCT 42.7 11/05/2021    MCV 95 11/05/2021     11/05/2021    CHOL 217 (H) 11/05/2021    TRIG 62 11/05/2021     (H) 11/05/2021    ALT 13 11/05/2021    AST 21 11/05/2021    BILITOT 0.2 11/05/2021    ALKPHOS 38 (L) 11/05/2021     11/05/2021    K 4.4 11/05/2021     11/05/2021    CREATININE 0.9 11/05/2021    ESTGFRAFRICA >60.0 11/05/2021    EGFRNONAA >60.0 11/05/2021    CALCIUM 9.5 11/05/2021    ALBUMIN 4.1 11/05/2021    BUN 13 11/05/2021    CO2 27 11/05/2021    TSH 1.696 11/05/2021    HGBA1C 5.1 11/05/2021    LDLCALC 102.6 11/05/2021    GLU 87 11/05/2021    [id number] 42 11/05/2021             Vital signs reviewed  PE:   APPEARANCE: Well nourished, well developed, in no acute distress.    HEAD: Normocephalic, atraumatic.  EYES: EOMI.  Conjunctivae noninjected.  NOSE: Mucosa pink. Airway clear.  MOUTH & THROAT: No tonsillar enlargement. No pharyngeal erythema or exudate.   NECK: Supple with no cervical lymphadenopathy.    CHEST: Good inspiratory effort. Lungs clear to auscultation with no wheezes or crackles. Mild TTP on left costal margin, no bruise  CARDIOVASCULAR: Normal S1, S2. No rubs, murmurs, or gallops.  ABDOMEN: Bowel sounds normal. Not distended. Soft. No tenderness or masses. No organomegaly.  EXTREMITIES: No edema, cyanosis, or clubbing.    Review of Systems   Constitutional:  Negative for chills, fatigue and fever.   HENT:  Positive for  nasal congestion, rhinorrhea and sore throat. Negative for ear discharge, ear pain, hearing loss, nosebleeds, postnasal drip, sinus pressure/congestion, sneezing, tinnitus, trouble swallowing and voice change.    Respiratory:  Positive for cough. Negative for shortness of breath and wheezing.    Cardiovascular:  Negative for chest pain, palpitations and leg swelling.   Gastrointestinal: Negative.    Genitourinary: Negative.    Neurological: Negative.    Psychiatric/Behavioral: Negative.     All other systems reviewed and are negative.    IMPRESSION  1. Rib pain on left side    2. Viral URI with cough          PLAN      1. Rib pain on left side  Seems like MSK, will do x ray rib to rule out fracture, muscle relaxants prescribes,advised to take ibuprofen prn    - X-Ray Ribs 2 View Left; Future    - methocarbamoL (ROBAXIN) 500 MG Tab; Take 1 tablet (500 mg total) by mouth nightly as needed.  Dispense: 15 tablet; Refill: 0      2. Viral URI with cough    - promethazine-dextromethorphan (PROMETHAZINE-DM) 6.25-15 mg/5 mL Syrp; Take 5 mLs by mouth every 4 (four) hours as needed.  Dispense: 118 mL; Refill: 0     Supportive treatment discussed  Mouth wash gargles, steam inhalation  Tylenol/ibuprofen as needed      Return ED/UC precautions discussed      Age/demographic appropriate health maintenance:    Health Maintenance Due   Topic Date Due    TETANUS VACCINE  Never done    Shingles Vaccine (1 of 2) Never done    COVID-19 Vaccine (3 - Booster for Pfizer series) 06/17/2021    Influenza Vaccine (1) 09/01/2022           Zachariah Weinstein   2/2/2023

## 2023-02-03 ENCOUNTER — PATIENT MESSAGE (OUTPATIENT)
Dept: FAMILY MEDICINE | Facility: CLINIC | Age: 53
End: 2023-02-03
Payer: COMMERCIAL

## 2023-03-07 ENCOUNTER — CLINICAL SUPPORT (OUTPATIENT)
Dept: ENDOSCOPY | Facility: HOSPITAL | Age: 53
End: 2023-03-07
Attending: INTERNAL MEDICINE
Payer: COMMERCIAL

## 2023-03-07 DIAGNOSIS — Z12.11 SCREEN FOR COLON CANCER: ICD-10-CM

## 2023-03-07 NOTE — PLAN OF CARE
Spoke with pt who states she thinks she has order for an Endoscopy. Pt informed no orders seen for an endoscopy but orders seen for a colonoscopy. Pt states she had colonoscopy done in 11/2022 and I also verified that with repeat  x 10 yrs. Pt was transferred and will f/u with PCP to get order for an Endoscopy. Pt verbalized understanding.

## 2023-03-16 ENCOUNTER — OFFICE VISIT (OUTPATIENT)
Dept: INTERNAL MEDICINE | Facility: CLINIC | Age: 53
End: 2023-03-16
Payer: COMMERCIAL

## 2023-03-16 VITALS
DIASTOLIC BLOOD PRESSURE: 72 MMHG | HEIGHT: 67 IN | HEART RATE: 62 BPM | BODY MASS INDEX: 24.67 KG/M2 | OXYGEN SATURATION: 99 % | WEIGHT: 157.19 LBS | SYSTOLIC BLOOD PRESSURE: 124 MMHG

## 2023-03-16 DIAGNOSIS — Z00.00 WELLNESS EXAMINATION: Primary | ICD-10-CM

## 2023-03-16 PROCEDURE — 1159F MED LIST DOCD IN RCRD: CPT | Mod: CPTII,S$GLB,, | Performed by: INTERNAL MEDICINE

## 2023-03-16 PROCEDURE — 3008F PR BODY MASS INDEX (BMI) DOCUMENTED: ICD-10-PCS | Mod: CPTII,S$GLB,, | Performed by: INTERNAL MEDICINE

## 2023-03-16 PROCEDURE — 93005 EKG 12-LEAD: ICD-10-PCS | Mod: S$GLB,,, | Performed by: INTERNAL MEDICINE

## 2023-03-16 PROCEDURE — 93005 ELECTROCARDIOGRAM TRACING: CPT | Mod: S$GLB,,, | Performed by: INTERNAL MEDICINE

## 2023-03-16 PROCEDURE — 93010 EKG 12-LEAD: ICD-10-PCS | Mod: S$GLB,,, | Performed by: INTERNAL MEDICINE

## 2023-03-16 PROCEDURE — 99396 PR PREVENTIVE VISIT,EST,40-64: ICD-10-PCS | Mod: S$GLB,,, | Performed by: INTERNAL MEDICINE

## 2023-03-16 PROCEDURE — 3078F DIAST BP <80 MM HG: CPT | Mod: CPTII,S$GLB,, | Performed by: INTERNAL MEDICINE

## 2023-03-16 PROCEDURE — 99396 PREV VISIT EST AGE 40-64: CPT | Mod: S$GLB,,, | Performed by: INTERNAL MEDICINE

## 2023-03-16 PROCEDURE — 1159F PR MEDICATION LIST DOCUMENTED IN MEDICAL RECORD: ICD-10-PCS | Mod: CPTII,S$GLB,, | Performed by: INTERNAL MEDICINE

## 2023-03-16 PROCEDURE — 99999 PR PBB SHADOW E&M-EST. PATIENT-LVL IV: CPT | Mod: PBBFAC,,, | Performed by: INTERNAL MEDICINE

## 2023-03-16 PROCEDURE — 3008F BODY MASS INDEX DOCD: CPT | Mod: CPTII,S$GLB,, | Performed by: INTERNAL MEDICINE

## 2023-03-16 PROCEDURE — 3074F SYST BP LT 130 MM HG: CPT | Mod: CPTII,S$GLB,, | Performed by: INTERNAL MEDICINE

## 2023-03-16 PROCEDURE — 93010 ELECTROCARDIOGRAM REPORT: CPT | Mod: S$GLB,,, | Performed by: INTERNAL MEDICINE

## 2023-03-16 PROCEDURE — 3074F PR MOST RECENT SYSTOLIC BLOOD PRESSURE < 130 MM HG: ICD-10-PCS | Mod: CPTII,S$GLB,, | Performed by: INTERNAL MEDICINE

## 2023-03-16 PROCEDURE — 3078F PR MOST RECENT DIASTOLIC BLOOD PRESSURE < 80 MM HG: ICD-10-PCS | Mod: CPTII,S$GLB,, | Performed by: INTERNAL MEDICINE

## 2023-03-16 PROCEDURE — 99999 PR PBB SHADOW E&M-EST. PATIENT-LVL IV: ICD-10-PCS | Mod: PBBFAC,,, | Performed by: INTERNAL MEDICINE

## 2023-03-16 RX ORDER — ESCITALOPRAM OXALATE 10 MG/1
5 TABLET ORAL DAILY
Qty: 90 TABLET | Refills: 1
Start: 2023-03-16 | End: 2024-01-16

## 2023-03-16 NOTE — PROGRESS NOTES
Subjective:       Patient ID: Naima Jeffries is a 52 y.o. female.    Chief Complaint: Annual Exam    HPIPt is doing okay - having a cycle monthly over the last few months.  No CP or SOB. Still has R hip pain intermittently. Some numbness in her R hand intermittently at night.   Review of Systems   Respiratory:  Negative for shortness of breath (PND or orthopnea).    Cardiovascular:  Negative for chest pain (arm pain or jaw pain).   Gastrointestinal:  Negative for abdominal pain, diarrhea, nausea and vomiting.   Genitourinary:  Negative for dysuria.   Neurological:  Negative for seizures, syncope and headaches.     Objective:      Physical Exam  Constitutional:       General: She is not in acute distress.     Appearance: She is well-developed.   HENT:      Head: Normocephalic.   Eyes:      Pupils: Pupils are equal, round, and reactive to light.   Neck:      Thyroid: No thyromegaly.      Vascular: No JVD.   Cardiovascular:      Rate and Rhythm: Normal rate and regular rhythm.      Heart sounds: Normal heart sounds. No murmur heard.    No friction rub. No gallop.   Pulmonary:      Effort: Pulmonary effort is normal.      Breath sounds: Normal breath sounds. No wheezing or rales.   Abdominal:      General: Bowel sounds are normal. There is no distension.      Palpations: Abdomen is soft. There is no mass.      Tenderness: There is no abdominal tenderness. There is no guarding or rebound.   Musculoskeletal:      Cervical back: Neck supple.   Lymphadenopathy:      Cervical: No cervical adenopathy.   Skin:     General: Skin is warm and dry.   Neurological:      Mental Status: She is alert and oriented to person, place, and time.      Deep Tendon Reflexes: Reflexes are normal and symmetric.   Psychiatric:         Behavior: Behavior normal.         Thought Content: Thought content normal.         Judgment: Judgment normal.       Assessment:       1. Wellness examination          Plan:   Wellness examination  -     CBC Auto  Differential; Future; Expected date: 03/16/2023  -     Comprehensive Metabolic Panel; Future; Expected date: 03/16/2023  -     Lipid Panel; Future; Expected date: 03/16/2023  -     TSH; Future; Expected date: 03/16/2023  -     Hemoglobin A1C; Future; Expected date: 03/16/2023  -     Vitamin D; Future; Expected date: 03/16/2023  -     Follicle Stimulating Hormone; Future; Expected date: 03/16/2023  -     ESTRADIOL; Future; Expected date: 03/16/2023  -     Vitamin B12; Future; Expected date: 04/16/2023  -     Methylmalonic Acid, Serum; Future; Expected date: 03/16/2023  -     Ambulatory referral/consult to Orthopedics; Future; Expected date: 03/23/2023  -     Ambulatory referral/consult to Psychiatry; Future; Expected date: 03/23/2023 - ADD testing  -     EKG 12-lead  -     Urinalysis; Future; Expected date: 03/16/2023  -     HIV 1/2 Ag/Ab (4th Gen); Future; Expected date: 03/16/2023  -     HEPATITIS PANEL, ACUTE; Future; Expected date: 03/16/2023  -     RPR; Future; Expected date: 03/16/2023    Other orders  -     EScitalopram oxalate (LEXAPRO) 10 MG tablet; Take 0.5 tablets (5 mg total) by mouth once daily.  Dispense: 90 tablet; Refill: 1

## 2023-03-21 DIAGNOSIS — M25.551 RIGHT HIP PAIN: Primary | ICD-10-CM

## 2023-03-22 ENCOUNTER — LAB VISIT (OUTPATIENT)
Dept: LAB | Facility: HOSPITAL | Age: 53
End: 2023-03-22
Attending: INTERNAL MEDICINE
Payer: COMMERCIAL

## 2023-03-22 DIAGNOSIS — Z00.00 WELLNESS EXAMINATION: ICD-10-CM

## 2023-03-22 LAB
25(OH)D3+25(OH)D2 SERPL-MCNC: 22 NG/ML (ref 30–96)
ALBUMIN SERPL BCP-MCNC: 4.5 G/DL (ref 3.5–5.2)
ALP SERPL-CCNC: 44 U/L (ref 55–135)
ALT SERPL W/O P-5'-P-CCNC: 15 U/L (ref 10–44)
ANION GAP SERPL CALC-SCNC: 12 MMOL/L (ref 8–16)
AST SERPL-CCNC: 22 U/L (ref 10–40)
BILIRUB SERPL-MCNC: 0.5 MG/DL (ref 0.1–1)
BUN SERPL-MCNC: 14 MG/DL (ref 6–20)
CALCIUM SERPL-MCNC: 10.3 MG/DL (ref 8.7–10.5)
CHLORIDE SERPL-SCNC: 103 MMOL/L (ref 95–110)
CHOLEST SERPL-MCNC: 233 MG/DL (ref 120–199)
CHOLEST/HDLC SERPL: 2.5 {RATIO} (ref 2–5)
CO2 SERPL-SCNC: 25 MMOL/L (ref 23–29)
CREAT SERPL-MCNC: 0.8 MG/DL (ref 0.5–1.4)
EST. GFR  (NO RACE VARIABLE): >60 ML/MIN/1.73 M^2
ESTIMATED AVG GLUCOSE: 100 MG/DL (ref 68–131)
ESTRADIOL SERPL-MCNC: 40 PG/ML
FSH SERPL-ACNC: 34.81 MIU/ML
GLUCOSE SERPL-MCNC: 77 MG/DL (ref 70–110)
HAV IGM SERPL QL IA: NORMAL
HBA1C MFR BLD: 5.1 % (ref 4–5.6)
HBV CORE IGM SERPL QL IA: NORMAL
HBV SURFACE AG SERPL QL IA: NORMAL
HCV AB SERPL QL IA: NORMAL
HDLC SERPL-MCNC: 95 MG/DL (ref 40–75)
HDLC SERPL: 40.8 % (ref 20–50)
HIV 1+2 AB+HIV1 P24 AG SERPL QL IA: NORMAL
LDLC SERPL CALC-MCNC: 128.8 MG/DL (ref 63–159)
NONHDLC SERPL-MCNC: 138 MG/DL
POTASSIUM SERPL-SCNC: 4.6 MMOL/L (ref 3.5–5.1)
PROT SERPL-MCNC: 7.9 G/DL (ref 6–8.4)
SODIUM SERPL-SCNC: 140 MMOL/L (ref 136–145)
TRIGL SERPL-MCNC: 46 MG/DL (ref 30–150)
TSH SERPL DL<=0.005 MIU/L-ACNC: 2.04 UIU/ML (ref 0.4–4)
VIT B12 SERPL-MCNC: 196 PG/ML (ref 210–950)

## 2023-03-22 PROCEDURE — 82670 ASSAY OF TOTAL ESTRADIOL: CPT | Performed by: INTERNAL MEDICINE

## 2023-03-22 PROCEDURE — 36415 COLL VENOUS BLD VENIPUNCTURE: CPT | Mod: PN | Performed by: INTERNAL MEDICINE

## 2023-03-22 PROCEDURE — 86592 SYPHILIS TEST NON-TREP QUAL: CPT | Performed by: INTERNAL MEDICINE

## 2023-03-22 PROCEDURE — 83921 ORGANIC ACID SINGLE QUANT: CPT | Performed by: INTERNAL MEDICINE

## 2023-03-22 PROCEDURE — 83036 HEMOGLOBIN GLYCOSYLATED A1C: CPT | Performed by: INTERNAL MEDICINE

## 2023-03-22 PROCEDURE — 87389 HIV-1 AG W/HIV-1&-2 AB AG IA: CPT | Performed by: INTERNAL MEDICINE

## 2023-03-22 PROCEDURE — 80053 COMPREHEN METABOLIC PANEL: CPT | Performed by: INTERNAL MEDICINE

## 2023-03-22 PROCEDURE — 80061 LIPID PANEL: CPT | Performed by: INTERNAL MEDICINE

## 2023-03-22 PROCEDURE — 83001 ASSAY OF GONADOTROPIN (FSH): CPT | Performed by: INTERNAL MEDICINE

## 2023-03-22 PROCEDURE — 82306 VITAMIN D 25 HYDROXY: CPT | Performed by: INTERNAL MEDICINE

## 2023-03-22 PROCEDURE — 82607 VITAMIN B-12: CPT | Performed by: INTERNAL MEDICINE

## 2023-03-22 PROCEDURE — 80074 ACUTE HEPATITIS PANEL: CPT | Performed by: INTERNAL MEDICINE

## 2023-03-22 PROCEDURE — 84443 ASSAY THYROID STIM HORMONE: CPT | Performed by: INTERNAL MEDICINE

## 2023-03-23 LAB — RPR SER QL: NORMAL

## 2023-03-28 LAB — METHYLMALONATE SERPL-SCNC: 0.17 UMOL/L

## 2023-04-13 ENCOUNTER — OFFICE VISIT (OUTPATIENT)
Dept: ORTHOPEDICS | Facility: CLINIC | Age: 53
End: 2023-04-13
Payer: COMMERCIAL

## 2023-04-13 ENCOUNTER — HOSPITAL ENCOUNTER (OUTPATIENT)
Dept: RADIOLOGY | Facility: HOSPITAL | Age: 53
Discharge: HOME | End: 2023-04-13
Attending: ORTHOPAEDIC SURGERY
Payer: COMMERCIAL

## 2023-04-13 VITALS — WEIGHT: 159.38 LBS | HEIGHT: 66 IN | BODY MASS INDEX: 25.61 KG/M2

## 2023-04-13 DIAGNOSIS — M25.551 RIGHT HIP PAIN: ICD-10-CM

## 2023-04-13 DIAGNOSIS — M16.11 PRIMARY OSTEOARTHRITIS OF RIGHT HIP: Primary | ICD-10-CM

## 2023-04-13 DIAGNOSIS — Z00.00 WELLNESS EXAMINATION: ICD-10-CM

## 2023-04-13 PROCEDURE — 3044F PR MOST RECENT HEMOGLOBIN A1C LEVEL <7.0%: ICD-10-PCS | Mod: CPTII,S$GLB,, | Performed by: ORTHOPAEDIC SURGERY

## 2023-04-13 PROCEDURE — 1159F MED LIST DOCD IN RCRD: CPT | Mod: CPTII,S$GLB,, | Performed by: ORTHOPAEDIC SURGERY

## 2023-04-13 PROCEDURE — 73502 XR HIP WITH PELVIS WHEN PERFORMED, 2 OR 3  VIEWS RIGHT: ICD-10-PCS | Mod: 26,RT,, | Performed by: RADIOLOGY

## 2023-04-13 PROCEDURE — 3008F PR BODY MASS INDEX (BMI) DOCUMENTED: ICD-10-PCS | Mod: CPTII,S$GLB,, | Performed by: ORTHOPAEDIC SURGERY

## 2023-04-13 PROCEDURE — 73502 X-RAY EXAM HIP UNI 2-3 VIEWS: CPT | Mod: 26,RT,, | Performed by: RADIOLOGY

## 2023-04-13 PROCEDURE — 1159F PR MEDICATION LIST DOCUMENTED IN MEDICAL RECORD: ICD-10-PCS | Mod: CPTII,S$GLB,, | Performed by: ORTHOPAEDIC SURGERY

## 2023-04-13 PROCEDURE — 99204 PR OFFICE/OUTPT VISIT, NEW, LEVL IV, 45-59 MIN: ICD-10-PCS | Mod: S$GLB,,, | Performed by: ORTHOPAEDIC SURGERY

## 2023-04-13 PROCEDURE — 3044F HG A1C LEVEL LT 7.0%: CPT | Mod: CPTII,S$GLB,, | Performed by: ORTHOPAEDIC SURGERY

## 2023-04-13 PROCEDURE — 73502 X-RAY EXAM HIP UNI 2-3 VIEWS: CPT | Mod: TC,RT

## 2023-04-13 PROCEDURE — 99204 OFFICE O/P NEW MOD 45 MIN: CPT | Mod: S$GLB,,, | Performed by: ORTHOPAEDIC SURGERY

## 2023-04-13 PROCEDURE — 99999 PR PBB SHADOW E&M-EST. PATIENT-LVL III: ICD-10-PCS | Mod: PBBFAC,,, | Performed by: ORTHOPAEDIC SURGERY

## 2023-04-13 PROCEDURE — 3008F BODY MASS INDEX DOCD: CPT | Mod: CPTII,S$GLB,, | Performed by: ORTHOPAEDIC SURGERY

## 2023-04-13 PROCEDURE — 99999 PR PBB SHADOW E&M-EST. PATIENT-LVL III: CPT | Mod: PBBFAC,,, | Performed by: ORTHOPAEDIC SURGERY

## 2023-04-13 NOTE — PROGRESS NOTES
Subjective:     HPI:   Naima Jeffries is a 52 y.o. female who presents for eval R hip pain ref by Dr Garcia    She thinks symptoms began in  during childbirth.  She went to a chiropractor and things got better.  More recently she is developed some intermittent symptoms over the past months to years.  Symptoms are intermittent mostly groin pain occasional C-sign distribution to the buttock.  Occasional anterior thigh discomfort which feels weak no radicular pain or paresthesias.  She has some mechanical symptoms sometimes it feels like her hip and leg gets stuck.  She says it is worse in the morning or if she does strengthening exercises like taking 2 steps at a time    Medications: Ibuprofen (400mg, PRN) 2 tab over past couple weeks    Injections: ?IA injection for MRI with contrast done in 2019, did provide some relief for a couple months    Physical Therapy: Yes, 2019 completed OP-PT when she was living in [place], she said that she did PT for her lower back and right hip stopped due to move back to [facility]    Assistive Devices: None     Walkin - 5 blocks    Limitations:     Worse in morning: Bending over, Pick something up off floor all early in the day  Better as day goes on    Occupation: The patient works as an  for [employer]. [employer]    Social support: The patient stated that they live at home with alone with her two kids. The patient stated that their Dad  would be able to help take care of them if they were to have surgery.     The patient moved from [place] back to [place] in .      ROS:  The updated medical history is in the chart and has been reviewed. A review of systems is updated and there is no reported vision changes, ear/nose/mouth/throat complaints,  chest pain, shortness of breath, abdominal pain, urological complaints, fevers or chills, psychiatric complaints. Musculoskeletal and neurologcial symptoms are as documented. All other systems are  negative.      Objective:   Exam:  There were no vitals filed for this visit.  Body mass index is 25.73 kg/m².    Physical examination included assessment of the patient's general appearance with particular attention to development, nutrition, body habitus, attention to grooming, and any evidence of distress.  Constitutional: The patient is a well-developed, well-nourished patient in no acute distress.   Cardiovascular: Vascular examination included warmth and capillary refill as well inspection for edema and assessment of pedal pulses. Pulses are palpable and regular.  Musculoskeletal: Gait was assessed as to whether it was steady, non-antalgic, and/or required the use of an assist device. The patient was also asked to walk independently and get onto the examination table.  Skin: The skin was examined for any obvious rashes or lesions in the extremity.  Neurologic: Sensation is intact to light touch in the extremity. The patient has good coordination without hyperreflexia and is alert and oriented to person, place and time and has normal mood and affect.     All of the above were examined and found to be within normal limits except for the following pertinent clinical findings:    No limp nonantalgic gait negative Trendelenburg.  Can do single leg stance without pain.  Nontender over the greater trochanter.  No groin pain with active straight leg raise 0-120 of flexion 40 abduction 30 adduction 60 external 30 internal rotation with no pain.  She says her right hip does feel different than the left but no pain and negative flexion abduction internal rotation and flexion abduction and external rotation.  No significant limb length discrepancy supine      Imaging:    HIP R ARTHRITIS        Indication:  Right hip pain  Exam Ordered: Radiographs include an anteroposterior pelvis, an anteroposterior and lateral view of the proximal femur including the hip joint.  Details of Examination: Exam shows evidence of joint space  narrowing, osteophyte formation, and subchondral sclerosis, all consistent with degenerative arthritis of the hip.  No other significant findings are noted.  Impression:  Degenerative Arthritis, Right Hip    R hip Tg1-2 degen changes R slightly more than left    MRI 2019 in [place], unable to obtain records      Assessment:       ICD-10-CM ICD-9-CM   1. Primary osteoarthritis of right hip  M16.11 715.15   2. Wellness examination  Z00.00 V70.0      No sig PMHx  Not on HRT     Plan:       The above findings were discussed with patient length. We discussed the risks of conservative versus surgical management of the patients hip arthritis. Conservative management consisting of anti-inflammatory medications, weight loss, physical therapy, and activity modification was discussed at length. At this point considering the patient's level of activity, pain, and radiographic findings I recommend continued conservative management.     The patient was given a handout with treatment strategies for hip and knee joint care prior to surgery from [employer], the [employer].   This included information regarding medications, injections, weight loss, exercise, braces, physical therapy, and alternative therapies.     I explained the potentially adverse gastric, cardiac, and renal effects of NSAIDs and explained that if the patient wishes to take it for longer that the patient should discuss this with their primary care physician to determine if it is safe to do so.    x Tylenol   x Aleve    Voltaren Gel   x [employer] non-op arthritis info    Bursitis info    Total Joint Info   x HEP: [employer] home exercise conditioning program    x PT    CSI: intra-articular steroid injection    CSI: greater trochanter bursitis    HA: hyaluronic acid injection    Brace:    x Referral:      I think she has early hip arthritis and likely a symptomatic degenerative labral tear.    She has no significant symptoms on exam  today    Recommend non operative optimization with anti-inflammatory medications, physical therapy    Recommend follow-up in 6 weeks with Sports Medicine Dr. Cevallos for potential intra-articular injection which has helped in the past.    Follow up with me if symptoms progress and patient would like to discuss arthroplasty which is not indicated at this time    Orders Placed This Encounter   Procedures    Ambulatory referral/consult to Physical/Occupational Therapy     Standing Status:   Future     Standing Expiration Date:   5/13/2024     Referral Priority:   Routine     Referral Type:   Physical Medicine     Referral Reason:   Specialty Services Required     Number of Visits Requested:   1    Ambulatory referral/consult to Sports Medicine     Standing Status:   Future     Standing Expiration Date:   5/13/2024     Referral Priority:   Routine     Referral Type:   Consultation     Referral Reason:   Specialty Services Required     Referred to Provider:   Feliciano Cevallos MD     Requested Specialty:   Sports Medicine     Number of Visits Requested:   1             Past Medical History:   Diagnosis Date    Anxiety     Depression     Fever blister     S/P D&C (status post dilation and curettage)        Past Surgical History:   Procedure Laterality Date    BREAST BIOPSY Right 2018    benign    COLONOSCOPY N/A 7/28/2022    Procedure: COLONOSCOPY;  Surgeon: Akua Haider MD;  Location: [facility] ([address] [place] [address]);  Service: Endoscopy;  Laterality: N/A;  original order from Dr Florian on 11/5/21-unusable. new order placed        vaccinated-GT    COLONOSCOPY N/A 11/9/2022    Procedure: COLONOSCOPY;  Surgeon: Gael Rico MD;  Location: [facility] ([address] [place] [address]);  Service: Endoscopy;  Laterality: N/A;  old case request unusable, new case request placed  fully vaccinated, instructions emailed to [email or web address]  golytely. pre call complete A.s    EXCISION OF PTERYGIUM Right        Family History   Problem  Relation Age of Onset    Stroke Mother     Breast cancer Paternal Aunt     Breast cancer Cousin     Colon cancer Neg Hx     Ovarian cancer Neg Hx        Social History     Socioeconomic History    Marital status: Single   Tobacco Use    Smoking status: Never    Smokeless tobacco: Never   Substance and Sexual Activity    Alcohol use: Yes     Comment: socially    Drug use: Never    Sexual activity: Yes

## 2023-06-24 ENCOUNTER — PATIENT MESSAGE (OUTPATIENT)
Dept: OBSTETRICS AND GYNECOLOGY | Facility: CLINIC | Age: 53
End: 2023-06-24
Payer: COMMERCIAL

## 2023-06-26 RX ORDER — FLUCONAZOLE 150 MG/1
150 TABLET ORAL DAILY
Qty: 3 TABLET | Refills: 0 | Status: SHIPPED | OUTPATIENT
Start: 2023-06-26 | End: 2024-01-24

## 2023-06-28 ENCOUNTER — PATIENT MESSAGE (OUTPATIENT)
Dept: OBSTETRICS AND GYNECOLOGY | Facility: CLINIC | Age: 53
End: 2023-06-28
Payer: COMMERCIAL

## 2023-11-08 ENCOUNTER — OFFICE VISIT (OUTPATIENT)
Dept: URGENT CARE | Facility: CLINIC | Age: 53
End: 2023-11-08
Payer: COMMERCIAL

## 2023-11-08 VITALS
BODY MASS INDEX: 25.61 KG/M2 | HEIGHT: 66 IN | RESPIRATION RATE: 16 BRPM | SYSTOLIC BLOOD PRESSURE: 135 MMHG | WEIGHT: 159.38 LBS | OXYGEN SATURATION: 97 % | TEMPERATURE: 99 F | DIASTOLIC BLOOD PRESSURE: 85 MMHG | HEART RATE: 88 BPM

## 2023-11-08 DIAGNOSIS — U07.1 COVID-19: ICD-10-CM

## 2023-11-08 DIAGNOSIS — R50.9 FEVER, UNSPECIFIED FEVER CAUSE: Primary | ICD-10-CM

## 2023-11-08 LAB
CTP QC/QA: YES
MOLECULAR STREP A: NEGATIVE
POC MOLECULAR INFLUENZA A AGN: NEGATIVE
POC MOLECULAR INFLUENZA B AGN: NEGATIVE
SARS-COV-2 AG RESP QL IA.RAPID: POSITIVE

## 2023-11-08 PROCEDURE — 87651 STREP A DNA AMP PROBE: CPT | Mod: QW,S$GLB,, | Performed by: NURSE PRACTITIONER

## 2023-11-08 PROCEDURE — 87502 INFLUENZA DNA AMP PROBE: CPT | Mod: QW,S$GLB,, | Performed by: NURSE PRACTITIONER

## 2023-11-08 PROCEDURE — 99214 OFFICE O/P EST MOD 30 MIN: CPT | Mod: S$GLB,,, | Performed by: NURSE PRACTITIONER

## 2023-11-08 PROCEDURE — 87651 POCT STREP A MOLECULAR: ICD-10-PCS | Mod: QW,S$GLB,, | Performed by: NURSE PRACTITIONER

## 2023-11-08 PROCEDURE — 99214 PR OFFICE/OUTPT VISIT, EST, LEVL IV, 30-39 MIN: ICD-10-PCS | Mod: S$GLB,,, | Performed by: NURSE PRACTITIONER

## 2023-11-08 PROCEDURE — 87502 POCT INFLUENZA A/B MOLECULAR: ICD-10-PCS | Mod: QW,S$GLB,, | Performed by: NURSE PRACTITIONER

## 2023-11-08 PROCEDURE — 87811 SARS CORONAVIRUS 2 ANTIGEN POCT, MANUAL READ: ICD-10-PCS | Mod: QW,S$GLB,, | Performed by: NURSE PRACTITIONER

## 2023-11-08 PROCEDURE — 87811 SARS-COV-2 COVID19 W/OPTIC: CPT | Mod: QW,S$GLB,, | Performed by: NURSE PRACTITIONER

## 2023-11-08 RX ORDER — PROMETHAZINE HYDROCHLORIDE AND DEXTROMETHORPHAN HYDROBROMIDE 6.25; 15 MG/5ML; MG/5ML
5 SYRUP ORAL EVERY 4 HOURS PRN
Qty: 180 ML | Refills: 0 | Status: SHIPPED | OUTPATIENT
Start: 2023-11-08 | End: 2023-11-18

## 2023-11-08 NOTE — LETTER
[address] [facility] [address]  Phone: [phone]  Fax: [phone]          Return to Work/School    Patient: Naima Jeffries  YOB: 1970   Date: 11/08/2023     To Whom It May Concern:     Naima Jeffries was in contact with/seen in my office on 11/08/2023.      Naima Jeffries has met the criteria for COVID-19 testing based upon symptoms, travel, and/or potential exposure. The test has been completed and is POSITIVE. During this time the employee is not able to work and should be quarantined per the Centers for Disease Control timelines.     The patient may return to work/school on 11/11/2023.     If you have any questions or concerns, or if I can be of further assistance, please do not hesitate to contact me.     Sincerely,    KRISTIE Lama

## 2023-11-08 NOTE — LETTER
November 8, 2023      Urgent Care [address] [address]  Phone: [phone]  Fax: [phone]       Patient: Naima Jeffries   YOB: 1970  Date of Visit: 11/08/2023    To Whom It May Concern:    Leda Jeffries  was at [facility] on 11/08/2023. The patient may return to work/school on 11/11/2023 with no restrictions. If you have any questions or concerns, or if I can be of further assistance, please do not hesitate to contact me.    Sincerely,    GWENDOLYN RichterC

## 2023-11-08 NOTE — PATIENT INSTRUCTIONS
"  Fever, unspecified fever cause  -     POCT Influenza A/B MOLECULAR  -     SARS Coronavirus 2 Antigen, POCT Manual Read  -     POCT Strep A, Molecular    COVID-19    I spoke with the patient and reviewed results.  Covid 19 counseling and education reviewed.  No questions.      - Rest at home.     - Drink plenty of fluids so you won't get dehydrated.      Avoid taking Decongestants such as Sudafed, pseudoephedrine, phenylephrine or meds that say "cold," "sinus" or "-D".      - Cough recommendations:   Warm tea with honey can help with cough. Honey is a natural cough suppressant.     NIGHTTIME:  -     promethazine-dextromethorphan (PROMETHAZINE-DM) 6.25-15 mg/5 mL Syrp; Take 5 mLs by mouth every 4 (four) hours as needed (cough).  Dispense: 180 mL; Refill: 0      OR    DAYTIME:   - Dextromethorphan (DM) is a cough suppressant over the counter (ie. mucinex DM OR delsym).        - Congestion recommendations:    - Mucinex (guaifenesin) twice a day (or as directed) to help loosen mucous.       - Fever/Pain recommendations:  Alternate Tylenol or Ibuprofen as directed for fever/pain.   - Motrin/Ibuprofen every 6-8 hours for pain and inflammation. Do not take ibuprofen if you have a history of GI bleeding, kidney disease, or if you take blood thinners.    - Tylenol/acetaminophen every 6-8 hours for added pain relief.  Avoid tylenol if you have a history of liver disease.       -Sore throat recommendations: Warm fluids, warm salt water gargles, throat lozenges, tea, honey, soup, or drinking something cold or frozen.  Throat lozenges or sprays help reduce pain. Gargling with warm saltwater (1/4 teaspoon of salt in 1/2 cup of warm water) or an OTC anesthetic gargle may be useful for irritation.      - Go to the ER if you develop new or worsening symptoms.      When to seek medical advice  Call your healthcare provider right away if any of these occur:  Fever that is poorly controlled with OTC fever reducing medication  New or " worsening ear pain, sinus pain, or headache  Stiff neck  You can't swallow liquids or you can't open your mouth wide because of throat pain  Signs of dehydration. These include very dark urine or no urine, sunken eyes, and dizziness.  Trouble breathing or noisy breathing  Muffled voice  Rash

## 2023-11-08 NOTE — PROGRESS NOTES
"Subjective:      Patient ID: Naima Jeffries is a 53 y.o. female.    Vitals:  height is 5' 6" (1.676 m) and weight is 72.3 kg (159 lb 6.3 oz). Her oral temperature is 98.8 °F (37.1 °C). Her blood pressure is 135/85 and her pulse is 88. Her respiration is 16 and oxygen saturation is 97%.     Chief Complaint: Sinus Problem      Pt is a 54 yo female presenting with fever (102), myalgia, congestion, sneezing, sore throat, and cough.  Onset of symptoms was 2 days ago.  Pt reports using OTC  sudafed and Theraflu.     Sinus Problem  This is a new problem. Episode onset: monday. The problem is unchanged. The maximum temperature recorded prior to her arrival was 102 - 102.9 F. The fever has been present for Less than 1 day. Her pain is at a severity of 7/10. The pain is moderate. Associated symptoms include congestion, coughing, sneezing and a sore throat. Pertinent negatives include no chills, diaphoresis, ear pain, headaches, hoarse voice, neck pain, shortness of breath, sinus pressure or swollen glands. Treatments tried: sudafed, theraflu. The treatment provided mild relief.       Constitution: Positive for fatigue and fever. Negative for chills and sweating.   HENT:  Positive for congestion and sore throat. Negative for ear pain, sinus pain and sinus pressure.    Neck: Negative for neck pain.   Cardiovascular:  Negative for chest pain.   Respiratory:  Positive for cough. Negative for sputum production and shortness of breath.    Gastrointestinal:  Negative for nausea, vomiting and diarrhea.   Musculoskeletal:  Positive for muscle ache.   Allergic/Immunologic: Positive for sneezing.   Neurological:  Negative for headaches.      Objective:     Physical Exam   Constitutional: She is oriented to person, place, and time.   HENT:   Head: Normocephalic.   Ears:   Right Ear: Hearing and external ear normal. No no drainage, swelling or tenderness. Tympanic membrane is not erythematous, not retracted and not bulging. No middle ear " "effusion.   Left Ear: Hearing and external ear normal. No no drainage, swelling or tenderness. Tympanic membrane is not erythematous, not retracted and not bulging.  No middle ear effusion.   Nose: Nose normal. No mucosal edema, rhinorrhea or purulent discharge. Right sinus exhibits no maxillary sinus tenderness and no frontal sinus tenderness. Left sinus exhibits no maxillary sinus tenderness and no frontal sinus tenderness.   Mouth/Throat: Uvula is midline and mucous membranes are normal. No trismus in the jaw. No uvula swelling. Posterior oropharyngeal erythema present. No oropharyngeal exudate or posterior oropharyngeal edema.   Cardiovascular: Normal rate and regular rhythm.   Pulmonary/Chest: Effort normal and breath sounds normal.   Neurological: She is alert and oriented to person, place, and time.   Skin: Skin is warm and dry.   Nursing note and vitals reviewed.      Assessment:     1. Fever, unspecified fever cause    2. COVID-19        Plan:       Fever, unspecified fever cause  -     POCT Influenza A/B MOLECULAR  -     SARS Coronavirus 2 Antigen, POCT Manual Read  -     POCT Strep A, Molecular    COVID-19  -     promethazine-dextromethorphan (PROMETHAZINE-DM) 6.25-15 mg/5 mL Syrp; Take 5 mLs by mouth every 4 (four) hours as needed (cough).  Dispense: 180 mL; Refill: 0      Patient Instructions     Fever, unspecified fever cause  -     POCT Influenza A/B MOLECULAR  -     SARS Coronavirus 2 Antigen, POCT Manual Read  -     POCT Strep A, Molecular    COVID-19    I spoke with the patient and reviewed results.  Covid 19 counseling and education reviewed.  No questions.      - Rest at home.     - Drink plenty of fluids so you won't get dehydrated.      Avoid taking Decongestants such as Sudafed, pseudoephedrine, phenylephrine or meds that say "cold," "sinus" or "-D".      - Cough recommendations:   Warm tea with honey can help with cough. Honey is a natural cough suppressant.     NIGHTTIME:  -     " promethazine-dextromethorphan (PROMETHAZINE-DM) 6.25-15 mg/5 mL Syrp; Take 5 mLs by mouth every 4 (four) hours as needed (cough).  Dispense: 180 mL; Refill: 0      OR    DAYTIME:   - Dextromethorphan (DM) is a cough suppressant over the counter (ie. mucinex DM OR delsym).        - Congestion recommendations:    - Mucinex (guaifenesin) twice a day (or as directed) to help loosen mucous.       - Fever/Pain recommendations:  Alternate Tylenol or Ibuprofen as directed for fever/pain.   - Motrin/Ibuprofen every 6-8 hours for pain and inflammation. Do not take ibuprofen if you have a history of GI bleeding, kidney disease, or if you take blood thinners.    - Tylenol/acetaminophen every 6-8 hours for added pain relief.  Avoid tylenol if you have a history of liver disease.       -Sore throat recommendations: Warm fluids, warm salt water gargles, throat lozenges, tea, honey, soup, or drinking something cold or frozen.  Throat lozenges or sprays help reduce pain. Gargling with warm saltwater (1/4 teaspoon of salt in 1/2 cup of warm water) or an OTC anesthetic gargle may be useful for irritation.      - Go to the ER if you develop new or worsening symptoms.      When to seek medical advice  Call your healthcare provider right away if any of these occur:  Fever that is poorly controlled with OTC fever reducing medication  New or worsening ear pain, sinus pain, or headache  Stiff neck  You can't swallow liquids or you can't open your mouth wide because of throat pain  Signs of dehydration. These include very dark urine or no urine, sunken eyes, and dizziness.  Trouble breathing or noisy breathing  Muffled voice  Rash

## 2023-11-09 ENCOUNTER — PATIENT MESSAGE (OUTPATIENT)
Dept: RESEARCH | Facility: HOSPITAL | Age: 53
End: 2023-11-09
Payer: COMMERCIAL

## 2023-12-11 ENCOUNTER — PATIENT MESSAGE (OUTPATIENT)
Dept: ADMINISTRATIVE | Facility: HOSPITAL | Age: 53
End: 2023-12-11
Payer: COMMERCIAL

## 2023-12-29 ENCOUNTER — OFFICE VISIT (OUTPATIENT)
Dept: URGENT CARE | Facility: CLINIC | Age: 53
End: 2023-12-29
Payer: COMMERCIAL

## 2023-12-29 VITALS
SYSTOLIC BLOOD PRESSURE: 125 MMHG | BODY MASS INDEX: 24.96 KG/M2 | OXYGEN SATURATION: 97 % | WEIGHT: 159 LBS | HEIGHT: 67 IN | DIASTOLIC BLOOD PRESSURE: 74 MMHG | TEMPERATURE: 98 F | RESPIRATION RATE: 18 BRPM | HEART RATE: 79 BPM

## 2023-12-29 DIAGNOSIS — J11.1 INFLUENZA: Primary | ICD-10-CM

## 2023-12-29 DIAGNOSIS — J02.9 SORE THROAT: ICD-10-CM

## 2023-12-29 LAB
CTP QC/QA: YES
MOLECULAR STREP A: NEGATIVE
POC MOLECULAR INFLUENZA A AGN: POSITIVE
POC MOLECULAR INFLUENZA B AGN: NEGATIVE
SARS-COV-2 AG RESP QL IA.RAPID: NEGATIVE

## 2023-12-29 PROCEDURE — 99213 PR OFFICE/OUTPT VISIT, EST, LEVL III, 20-29 MIN: ICD-10-PCS | Mod: S$GLB,,, | Performed by: PHYSICIAN ASSISTANT

## 2023-12-29 PROCEDURE — 87651 POCT STREP A MOLECULAR: ICD-10-PCS | Mod: QW,S$GLB,, | Performed by: PHYSICIAN ASSISTANT

## 2023-12-29 PROCEDURE — 87811 SARS-COV-2 COVID19 W/OPTIC: CPT | Mod: QW,S$GLB,, | Performed by: PHYSICIAN ASSISTANT

## 2023-12-29 PROCEDURE — 87502 INFLUENZA DNA AMP PROBE: CPT | Mod: QW,S$GLB,, | Performed by: PHYSICIAN ASSISTANT

## 2023-12-29 PROCEDURE — 87502 POCT INFLUENZA A/B MOLECULAR: ICD-10-PCS | Mod: QW,S$GLB,, | Performed by: PHYSICIAN ASSISTANT

## 2023-12-29 PROCEDURE — 99213 OFFICE O/P EST LOW 20 MIN: CPT | Mod: S$GLB,,, | Performed by: PHYSICIAN ASSISTANT

## 2023-12-29 PROCEDURE — 87651 STREP A DNA AMP PROBE: CPT | Mod: QW,S$GLB,, | Performed by: PHYSICIAN ASSISTANT

## 2023-12-29 PROCEDURE — 87811 SARS CORONAVIRUS 2 ANTIGEN POCT, MANUAL READ: ICD-10-PCS | Mod: QW,S$GLB,, | Performed by: PHYSICIAN ASSISTANT

## 2023-12-29 RX ORDER — PHENYLEPHRINE HCL/PROMETH HCL 5-6.25MG/5
5 SYRUP ORAL EVERY 6 HOURS PRN
COMMUNITY
End: 2024-01-24

## 2023-12-29 RX ORDER — BENZONATATE 100 MG/1
200 CAPSULE ORAL 3 TIMES DAILY PRN
Qty: 30 CAPSULE | Refills: 0 | Status: SHIPPED | OUTPATIENT
Start: 2023-12-29 | End: 2024-01-08

## 2023-12-29 NOTE — PROGRESS NOTES
"Subjective:      Patient ID: Naima Jeffries is a 53 y.o. female.    Vitals:  height is 5' 7" (1.702 m) and weight is 72.1 kg (159 lb). Her oral temperature is 98.4 °F (36.9 °C). Her blood pressure is 125/74 and her pulse is 79. Her respiration is 18 and oxygen saturation is 97%.     Chief Complaint: URI    Symptoms: sore throat, body aches, coughing, nasal congestion, chest congestion w/ yellow sputum since 12/24/2023  Meds taking: OTC cough med & promethazine.  No relief    URI   This is a new problem. The current episode started in the past 7 days. The problem has been waxing and waning. Associated symptoms include congestion, coughing, headaches, sinus pain and a sore throat. She has tried acetaminophen, decongestant and antihistamine for the symptoms. The treatment provided no relief.       Constitution: Positive for fatigue.   HENT:  Positive for congestion, sinus pain, sore throat and voice change.    Respiratory:  Positive for cough.    Neurological:  Positive for headaches.      Objective:     Physical Exam   Constitutional: She appears well-developed. She is cooperative.  Non-toxic appearance. She appears ill. No distress. normal  HENT:   Head: Normocephalic and atraumatic.   Ears:   Right Ear: Hearing, tympanic membrane, external ear and ear canal normal.   Left Ear: Hearing, tympanic membrane, external ear and ear canal normal.   Nose: Mucosal edema and rhinorrhea present. No nasal deformity. No epistaxis. Right sinus exhibits no maxillary sinus tenderness and no frontal sinus tenderness. Left sinus exhibits no maxillary sinus tenderness and no frontal sinus tenderness.   Mouth/Throat: Uvula is midline, oropharynx is clear and moist and mucous membranes are normal. No trismus in the jaw. Normal dentition. No uvula swelling. No oropharyngeal exudate, posterior oropharyngeal edema or posterior oropharyngeal erythema.   Eyes: Conjunctivae and lids are normal. Right conjunctiva is not injected. Left conjunctiva " is not injected. No scleral icterus.   Neck: Trachea normal and phonation normal. Neck supple. No edema present. No erythema present. No neck rigidity present.   Cardiovascular: Normal rate, regular rhythm and normal heart sounds.   Pulmonary/Chest: Effort normal and breath sounds normal. No respiratory distress. She has no decreased breath sounds. She has no rhonchi.   Musculoskeletal: Normal range of motion.         General: No deformity. Normal range of motion.      Cervical back: She exhibits no swelling.   Lymphadenopathy:        Head (right side): No submental and no submandibular adenopathy present.        Head (left side): No submental and no submandibular adenopathy present.     She has no cervical adenopathy.   Neurological: She is alert. She exhibits normal muscle tone. Coordination normal.   Skin: Skin is warm, dry, intact, not diaphoretic and not pale.   Psychiatric: She experiences Normal attention. Her speech is normal and behavior is normal. Mood and thought content normal.   Nursing note and vitals reviewed.      Results for orders placed or performed in visit on 12/29/23   SARS Coronavirus 2 Antigen, POCT Manual Read   Result Value Ref Range    SARS Coronavirus 2 Antigen Negative Negative     Acceptable Yes    POCT Influenza A/B MOLECULAR   Result Value Ref Range    POC Molecular Influenza A Ag Positive (A) Negative, Not Reported    POC Molecular Influenza B Ag Negative Negative, Not Reported     Acceptable Yes    POCT Strep A, Molecular   Result Value Ref Range    Molecular Strep A, POC Negative Negative     Acceptable Yes          Assessment:     1. Influenza    2. Sore throat        Plan:       Influenza  -     benzonatate (TESSALON) 100 MG capsule; Take 2 capsules (200 mg total) by mouth 3 (three) times daily as needed for Cough.  Dispense: 30 capsule; Refill: 0    Sore throat  -     SARS Coronavirus 2 Antigen, POCT Manual Read  -     POCT Influenza  A/B MOLECULAR  -     POCT Strep A, Molecular      Patient Instructions   Please drink plenty of fluids.    Please get plenty of rest.    Please return here or go to the Emergency Department for any concerns or worsening of condition.    If you were prescribed antibiotics, please take them to completion.    If you were prescribed a narcotic medication, do not drive or operate heavy equipment or machinery while taking these medications.    OVER THE COUNTER RECOMMENDATIONS/SUGGESTIONS.     Use Nasal Saline to mechanically move any post nasal drip from your eustachian tube or from the back of your throat.     Use warm salt water gargles to ease your throat pain. Warm salt water gargles as needed for sore throat-  1/2 tsp salt to 1 cup warm water, gargle as desired.     Use an antihistamine such as Claritin, Zyrtec or Allegra to dry you out.      Use pseudoephedrine (behind the counter) to decongest. Pseudoephedrine  30 mg up to 240 mg /day. It can raise your blood pressure and give you palpitations.    If you do not have Hypertension or any history of palpitations, it is ok to take over the counter Sudafed or Mucinex D or Allegra-D or Claritin-D or Zyrtec-D.  If you do take one of the above, it is ok to combine that with plain over the counter Mucinex or Allegra or Claritin or Zyrtec.  If for example you are taking Zyrtec -D, you can combine that with Mucinex, but not Mucinex-D.  If you are taking Mucinex-D, you can combine that with plain Allegra or Claritin or Zyrtec.     If you do have Hypertension or palpitations, it is safe to take Coricidin HBP for relief of sinus symptoms.     Use Afrin in each nare for no longer than 3 days, as it is addictive. It can also dry out your mucous membranes and cause elevated blood pressure. This is especially useful if you are flying.     Use Flonase 1-2 sprays/nostril per day. It is a local acting steroid nasal spray, if you develop a bloody nose, stop using the medication  immediately.     Sometimes Nyquil at night is beneficial to help you get some rest, however it is sedating and it does have an antihistamine, and tylenol.     Honey is a natural cough suppressant that can be used.     Tylenol up to 4,000 mg a day is safe for short periods and can be used for body aches, pain, and fever. However in high doses and prolonged use it can cause liver irritation.     Ibuprofen is a non-steroidal anti-inflammatory that can be used for body aches, pain, and fever.However it can also cause stomach irritation if over used.    If not allergic, please take over the counter Tylenol (Acetaminophen) and/or Motrin (Ibuprofen) as directed for control of pain and/or fever.    Please follow up with your primary care doctor or specialist as needed.    If you  smoke, please stop smoking.    Get well soon,    Miguel Cervantes PA-C

## 2023-12-29 NOTE — LETTER
December 29, 2023      Urgent [facility] [address] [address]  Phone: [phone]  Fax: [phone]       Patient: Naima Jeffries   YOB: 1970  Date of Visit: 12/29/2023    To Whom It May Concern:    Leda Jeffries  was at [facility] on 12/29/2023. The patient may return to work/school when symptoms improve and fever free for 24 hours without the use of fever reducing medications (ibuprofen, acetaminophen). If you have any questions or concerns, or if I can be of further assistance, please do not hesitate to contact me.    Sincerely,    Miguel Cervantes PA-C

## 2023-12-29 NOTE — PATIENT INSTRUCTIONS
Please drink plenty of fluids.    Please get plenty of rest.    Please return here or go to the Emergency Department for any concerns or worsening of condition.    If you were prescribed antibiotics, please take them to completion.    If you were prescribed a narcotic medication, do not drive or operate heavy equipment or machinery while taking these medications.    OVER THE COUNTER RECOMMENDATIONS/SUGGESTIONS.     Use Nasal Saline to mechanically move any post nasal drip from your eustachian tube or from the back of your throat.     Use warm salt water gargles to ease your throat pain. Warm salt water gargles as needed for sore throat-  1/2 tsp salt to 1 cup warm water, gargle as desired.     Use an antihistamine such as Claritin, Zyrtec or Allegra to dry you out.      Use pseudoephedrine (behind the counter) to decongest. Pseudoephedrine  30 mg up to 240 mg /day. It can raise your blood pressure and give you palpitations.    If you do not have Hypertension or any history of palpitations, it is ok to take over the counter Sudafed or Mucinex D or Allegra-D or Claritin-D or Zyrtec-D.  If you do take one of the above, it is ok to combine that with plain over the counter Mucinex or Allegra or Claritin or Zyrtec.  If for example you are taking Zyrtec -D, you can combine that with Mucinex, but not Mucinex-D.  If you are taking Mucinex-D, you can combine that with plain Allegra or Claritin or Zyrtec.     If you do have Hypertension or palpitations, it is safe to take Coricidin HBP for relief of sinus symptoms.     Use Afrin in each nare for no longer than 3 days, as it is addictive. It can also dry out your mucous membranes and cause elevated blood pressure. This is especially useful if you are flying.     Use Flonase 1-2 sprays/nostril per day. It is a local acting steroid nasal spray, if you develop a bloody nose, stop using the medication immediately.     Sometimes Nyquil at night is beneficial to help you get some  rest, however it is sedating and it does have an antihistamine, and tylenol.     Honey is a natural cough suppressant that can be used.     Tylenol up to 4,000 mg a day is safe for short periods and can be used for body aches, pain, and fever. However in high doses and prolonged use it can cause liver irritation.     Ibuprofen is a non-steroidal anti-inflammatory that can be used for body aches, pain, and fever.However it can also cause stomach irritation if over used.    If not allergic, please take over the counter Tylenol (Acetaminophen) and/or Motrin (Ibuprofen) as directed for control of pain and/or fever.    Please follow up with your primary care doctor or specialist as needed.    If you  smoke, please stop smoking.    Get well soon,    Miguel Cervantes PA-C

## 2023-12-29 NOTE — TELEPHONE ENCOUNTER
No care due was identified.  [employer] Embedded Care Due Messages. Reference number: [id number].   12/29/2023 12:34:13 PM CST

## 2023-12-30 NOTE — TELEPHONE ENCOUNTER
Refill Routing Note   Medication(s) are not appropriate for processing by [facility] for the following reason(s):        Drug-drug interaction : EScitalopram oxalate and fluconazole     [facility] action(s):  Defer               Appointments  past 12m or future 3m with PCP    Date Provider   Last Visit   3/16/2023 Isela Florian MD   Next Visit   Visit date not found Isela Florian MD   ED visits in past 90 days: 0        Note composed:5:43 AM 12/30/2023

## 2024-01-14 ENCOUNTER — OFFICE VISIT (OUTPATIENT)
Dept: URGENT CARE | Facility: CLINIC | Age: 54
End: 2024-01-14
Payer: COMMERCIAL

## 2024-01-14 VITALS
RESPIRATION RATE: 18 BRPM | DIASTOLIC BLOOD PRESSURE: 79 MMHG | HEART RATE: 90 BPM | OXYGEN SATURATION: 100 % | WEIGHT: 159 LBS | TEMPERATURE: 100 F | SYSTOLIC BLOOD PRESSURE: 116 MMHG | BODY MASS INDEX: 24.96 KG/M2 | HEIGHT: 67 IN

## 2024-01-14 DIAGNOSIS — J02.9 SORE THROAT: Primary | ICD-10-CM

## 2024-01-14 DIAGNOSIS — J02.0 STREP PHARYNGITIS: ICD-10-CM

## 2024-01-14 DIAGNOSIS — T36.95XA ANTIBIOTICS CAUSING ADVERSE EFFECT IN THERAPEUTIC USE, INITIAL ENCOUNTER: ICD-10-CM

## 2024-01-14 LAB
CTP QC/QA: YES
MOLECULAR STREP A: POSITIVE
POC MOLECULAR INFLUENZA A AGN: NEGATIVE
POC MOLECULAR INFLUENZA B AGN: NEGATIVE
SARS-COV-2 AG RESP QL IA.RAPID: NEGATIVE

## 2024-01-14 PROCEDURE — 87651 STREP A DNA AMP PROBE: CPT | Mod: QW,S$GLB,, | Performed by: FAMILY MEDICINE

## 2024-01-14 PROCEDURE — 99213 OFFICE O/P EST LOW 20 MIN: CPT | Mod: S$GLB,,, | Performed by: FAMILY MEDICINE

## 2024-01-14 PROCEDURE — 87502 INFLUENZA DNA AMP PROBE: CPT | Mod: QW,S$GLB,, | Performed by: FAMILY MEDICINE

## 2024-01-14 PROCEDURE — 87811 SARS-COV-2 COVID19 W/OPTIC: CPT | Mod: QW,S$GLB,, | Performed by: FAMILY MEDICINE

## 2024-01-14 RX ORDER — AMOXICILLIN 500 MG/1
500 TABLET, FILM COATED ORAL EVERY 12 HOURS
Qty: 20 TABLET | Refills: 0 | Status: SHIPPED | OUTPATIENT
Start: 2024-01-14 | End: 2024-01-24

## 2024-01-14 RX ORDER — FLUCONAZOLE 150 MG/1
150 TABLET ORAL
Qty: 2 TABLET | Refills: 0 | Status: SHIPPED | OUTPATIENT
Start: 2024-01-14 | End: 2024-01-18

## 2024-01-14 NOTE — PATIENT INSTRUCTIONS
Rest and hydrate!     Start amoxicillin antibiotic and use as instructed.      You can take a probiotic or eat yogurt with live culture and if you experience any yeast infection symptoms you can start the fluconazole.      You can repeat a dose in 72 hours if symptoms are persisting.  Often times only 1 dose as necessary.      In addition you can take ibuprofen up to 600 mg every 8 hours as needed for pain or fever or body aches

## 2024-01-14 NOTE — PROGRESS NOTES
"Subjective:      Patient ID: Naima Jeffries is a 53 y.o. female.    Vitals:  height is 5' 7" (1.702 m) and weight is 72.1 kg (159 lb). Her oral temperature is 99.5 °F (37.5 °C). Her blood pressure is 116/79 and her pulse is 90. Her respiration is 18 and oxygen saturation is 100%.     Chief Complaint: Sinus Problem    Pt states her symptoms started Friday night. Pt states she have a sore throat, congestion and bodyaches. Pt states she was very nauseous on yesterday. Pt states she had the Flu a few weeks ago. Pt states she have been feeling up and down after her dx of Flu. Pt states she was in bed all day yesterday. Pt states she have been taken Advil, Tylenol and Mucinex for her symptoms.     Sinus Problem  This is a new problem. The current episode started in the past 7 days. The problem has been gradually worsening since onset. There has been no fever. Her pain is at a severity of 8/10. The pain is moderate. Associated symptoms include chills, congestion, diaphoresis, headaches, neck pain, sinus pressure, a sore throat and swollen glands. Pertinent negatives include no coughing, ear pain, hoarse voice, shortness of breath or sneezing. Past treatments include acetaminophen (Mucinex and Advil). The treatment provided no relief.       Constitution: Positive for chills and sweating.   HENT:  Positive for congestion, sinus pressure and sore throat. Negative for ear pain.    Neck: Positive for neck pain.   Respiratory:  Negative for cough and shortness of breath.    Allergic/Immunologic: Negative for sneezing.   Neurological:  Positive for headaches.      Objective:     Physical Exam  Constitutional: Pt oriented to person, place, and time.  Non-toxic appearance.   Patient does not appear ill. No distress. normal  HENT: No icterus or facial swelling appreciated  Head: Normocephalic and atraumatic.   Nose: No congestion.   Oropharynx: pharynx/tonsils with  erythema , no  exudates. No uvular shift or soft palate swelling. No " stridor    Pulmonary/Chest: Effort normal. No stridor. No respiratory distress.   Abdominal: Normal appearance. Abdomen exhibits no distension.   Musculoskeletal:         General: No swelling.   Neurological: no focal deficit. Patient is alert and oriented to person, place, and time.   Skin: Skin is not diaphoretic and not pale. no jaundice  Psychiatric: Patients behavior is normal. Mood, judgment and thought content normal.     Assessment:     1. Sore throat    2. Strep pharyngitis    3. Antibiotics causing adverse effect in therapeutic use, initial encounter        Plan:       Sore throat  -     POCT Influenza A/B MOLECULAR  -     POCT Strep A, Molecular  -     SARS Coronavirus 2 Antigen, POCT Manual Read  -     amoxicillin (AMOXIL) 500 MG Tab; Take 1 tablet (500 mg total) by mouth every 12 (twelve) hours. for 10 days  Dispense: 20 tablet; Refill: 0    Strep pharyngitis  -     amoxicillin (AMOXIL) 500 MG Tab; Take 1 tablet (500 mg total) by mouth every 12 (twelve) hours. for 10 days  Dispense: 20 tablet; Refill: 0    Antibiotics causing adverse effect in therapeutic use, initial encounter  Get yeast infection symptoms while taking antibiotic.    Prescribed fluconazole for p.r.n. use and advised probiotic use during antibiotic treatment    -     fluconazole (DIFLUCAN) 150 MG Tab; Take 1 tablet (150 mg total) by mouth every 72 hours. for 2 doses  Dispense: 2 tablet; Refill: 0

## 2024-01-16 RX ORDER — ESCITALOPRAM OXALATE 10 MG/1
10 TABLET ORAL
Qty: 90 TABLET | Refills: 0 | Status: SHIPPED | OUTPATIENT
Start: 2024-01-16 | End: 2024-02-01 | Stop reason: SDUPTHER

## 2024-01-24 ENCOUNTER — OFFICE VISIT (OUTPATIENT)
Dept: OBSTETRICS AND GYNECOLOGY | Facility: CLINIC | Age: 54
End: 2024-01-24
Payer: COMMERCIAL

## 2024-01-24 ENCOUNTER — LAB VISIT (OUTPATIENT)
Dept: LAB | Facility: HOSPITAL | Age: 54
End: 2024-01-24
Attending: NURSE PRACTITIONER
Payer: COMMERCIAL

## 2024-01-24 VITALS
DIASTOLIC BLOOD PRESSURE: 72 MMHG | WEIGHT: 158.94 LBS | SYSTOLIC BLOOD PRESSURE: 122 MMHG | HEIGHT: 67 IN | BODY MASS INDEX: 24.94 KG/M2

## 2024-01-24 DIAGNOSIS — N89.8 VAGINAL ODOR: ICD-10-CM

## 2024-01-24 DIAGNOSIS — Z11.3 SCREEN FOR STD (SEXUALLY TRANSMITTED DISEASE): Primary | ICD-10-CM

## 2024-01-24 DIAGNOSIS — Z11.3 SCREEN FOR STD (SEXUALLY TRANSMITTED DISEASE): ICD-10-CM

## 2024-01-24 LAB
HAV IGM SERPL QL IA: NORMAL
HBV CORE IGM SERPL QL IA: NORMAL
HBV SURFACE AG SERPL QL IA: NORMAL
HCV AB SERPL QL IA: NORMAL
HIV 1+2 AB+HIV1 P24 AG SERPL QL IA: NORMAL

## 2024-01-24 PROCEDURE — 3008F BODY MASS INDEX DOCD: CPT | Mod: CPTII,S$GLB,, | Performed by: NURSE PRACTITIONER

## 2024-01-24 PROCEDURE — 36415 COLL VENOUS BLD VENIPUNCTURE: CPT | Mod: PN | Performed by: NURSE PRACTITIONER

## 2024-01-24 PROCEDURE — 99999 PR PBB SHADOW E&M-EST. PATIENT-LVL III: CPT | Mod: PBBFAC,,, | Performed by: NURSE PRACTITIONER

## 2024-01-24 PROCEDURE — 86592 SYPHILIS TEST NON-TREP QUAL: CPT | Performed by: NURSE PRACTITIONER

## 2024-01-24 PROCEDURE — 3078F DIAST BP <80 MM HG: CPT | Mod: CPTII,S$GLB,, | Performed by: NURSE PRACTITIONER

## 2024-01-24 PROCEDURE — 87389 HIV-1 AG W/HIV-1&-2 AB AG IA: CPT | Performed by: NURSE PRACTITIONER

## 2024-01-24 PROCEDURE — 87491 CHLMYD TRACH DNA AMP PROBE: CPT | Performed by: NURSE PRACTITIONER

## 2024-01-24 PROCEDURE — 3074F SYST BP LT 130 MM HG: CPT | Mod: CPTII,S$GLB,, | Performed by: NURSE PRACTITIONER

## 2024-01-24 PROCEDURE — 1159F MED LIST DOCD IN RCRD: CPT | Mod: CPTII,S$GLB,, | Performed by: NURSE PRACTITIONER

## 2024-01-24 PROCEDURE — 99213 OFFICE O/P EST LOW 20 MIN: CPT | Mod: S$GLB,,, | Performed by: NURSE PRACTITIONER

## 2024-01-24 PROCEDURE — 80074 ACUTE HEPATITIS PANEL: CPT | Performed by: NURSE PRACTITIONER

## 2024-01-24 RX ORDER — METRONIDAZOLE 7.5 MG/G
1 GEL VAGINAL DAILY
Qty: 70 G | Refills: 0 | Status: SHIPPED | OUTPATIENT
Start: 2024-01-24 | End: 2024-01-29

## 2024-01-24 NOTE — PROGRESS NOTES
CC: Screening for sexually transmitted infection    Naima Jeffries is a 53 y.o. female  presents for STD screening  Patient's last menstrual period was 01/15/2024..  Denies any new rashes or lesions.  Denies pelvic or abdominal pain.  Denies vulvovaginal itching or irritation.  Reports intermittent vaginal odor. Denies vaginal discharge.  She is still having cycles- menarche at age 14.   She has some dryness. No hot flashes.    Last pap:  wnl      ROS:  GENERAL: Denies weight gain or weight loss. Feeling well overall.   SKIN: Denies rash or lesions.   HEAD: Denies head injury or headache.   NODES: Denies enlarged lymph nodes.   CHEST: Denies chest pain or shortness of breath.   CARDIOVASCULAR: Denies palpitations or left sided chest pain.   ABDOMEN: No abdominal pain, constipation, diarrhea, nausea, vomiting or rectal bleeding.   URINARY: No frequency, dysuria, hematuria, or burning on urination.  REPRODUCTIVE: See HPI.   BREASTS: The patient performs breast self-examination and denies pain, lumps, or nipple discharge.   HEMATOLOGIC: No easy bruisability or excessive bleeding.   MUSCULOSKELETAL: Denies joint pain or swelling.   NEUROLOGIC: Denies syncope or weakness.   PSYCHIATRIC: Denies depression, anxiety or mood swings.      PHYSICAL EXAM:    APPEARANCE: Well nourished, well developed, in no acute distress.  AFFECT: Alert and oriented x 3  SKIN: Warm, dry, & intact. No acne or hirsutism.  NECK: Neck symmetric  NODES: No inguinal or femoral lymph node enlargement  CHEST:  Easy, even breaths.  ABDOMEN: Soft.  Nontender, nondistended.  PELVIC: Normal external genitalia without lesions.  Normal hair distribution.  Adequate perineal body, normal urethral meatus.    Vagina moist and well rugated without lesions or discharge.  Cervix pink, without lesions, discharge or tenderness.  No significant cystocele or rectocele.    Bimanual exam shows uterus to be normal size, regular, mobile and nontender.  Adnexa  without masses or tenderness.    EXTREMITIES: No edema.    Screen for STD (sexually transmitted disease)  -     C. trachomatis/N. gonorrhoeae by AMP DNA  -     HIV-1 and HIV-2 antibodies; Future; Expected date: 01/24/2024  -     RPR; Future; Expected date: 01/24/2024  -     Hepatitis panel, acute; Future; Expected date: 01/24/2024    Vaginal odor  -     metroNIDAZOLE (METROGEL VAGINAL) 0.75 % (37.5mg/5 gram) vaginal gel; Place 1 applicator vaginally once daily. for 5 days  Dispense: 70 g; Refill: 0        STD screening   Metrogel sent pt to use if vaginal odor persists- insert nightly for 5 nights.     Patient was counseled today on prevention of STDs with use of condoms.  We also reviewed A.C.S. Pap guidelines and recommendations for yearly pelvic exams, mammograms and monthly self breast exams; to see her PCP for other health maintenance.     Followup pending lab results      KRISTIE Valente

## 2024-01-25 LAB — RPR SER QL: NORMAL

## 2024-01-31 LAB
C TRACH DNA SPEC QL NAA+PROBE: NOT DETECTED
N GONORRHOEA DNA SPEC QL NAA+PROBE: NOT DETECTED

## 2024-02-01 NOTE — TELEPHONE ENCOUNTER
No care due was identified.  [facility] Embedded Care Due Messages. Reference number: [id number].   2/01/2024 10:50:01 AM CST

## 2024-02-05 ENCOUNTER — TELEPHONE (OUTPATIENT)
Dept: INTERNAL MEDICINE | Facility: CLINIC | Age: 54
End: 2024-02-05
Payer: COMMERCIAL

## 2024-02-05 RX ORDER — ESCITALOPRAM OXALATE 10 MG/1
10 TABLET ORAL DAILY
Qty: 90 TABLET | Refills: 0 | Status: SHIPPED | OUTPATIENT
Start: 2024-02-05 | End: 2024-05-10

## 2024-02-05 NOTE — TELEPHONE ENCOUNTER
----- Message from Yanet Lal sent at 2/5/2024  1:57 PM CST -----  Contact: Self  [phone]  Requesting an RX refill or new RX.    Is this a refill or new RX: refill    RX name and strength (copy/paste from chart):  EScitalopram oxalate (LEXAPRO) 10 MG tablet     Is this a 30 day or 90 day RX: 30    Pharmacy name and phone # (copy/paste from chart):      [facility] # [id number] - [address] [address]  Phone: [phone] Fax: [phone]

## 2024-03-08 ENCOUNTER — OFFICE VISIT (OUTPATIENT)
Dept: INTERNAL MEDICINE | Facility: CLINIC | Age: 54
End: 2024-03-08
Payer: COMMERCIAL

## 2024-03-08 VITALS
WEIGHT: 160.38 LBS | SYSTOLIC BLOOD PRESSURE: 134 MMHG | DIASTOLIC BLOOD PRESSURE: 80 MMHG | HEART RATE: 58 BPM | OXYGEN SATURATION: 99 % | HEIGHT: 67 IN | BODY MASS INDEX: 25.17 KG/M2

## 2024-03-08 DIAGNOSIS — Z00.00 WELLNESS EXAMINATION: Primary | ICD-10-CM

## 2024-03-08 DIAGNOSIS — L65.9 HAIR LOSS: ICD-10-CM

## 2024-03-08 DIAGNOSIS — F90.9 ATTENTION DEFICIT HYPERACTIVITY DISORDER (ADHD), UNSPECIFIED ADHD TYPE: ICD-10-CM

## 2024-03-08 PROCEDURE — 3008F BODY MASS INDEX DOCD: CPT | Mod: CPTII,S$GLB,, | Performed by: INTERNAL MEDICINE

## 2024-03-08 PROCEDURE — 99999 PR PBB SHADOW E&M-EST. PATIENT-LVL IV: CPT | Mod: PBBFAC,,, | Performed by: INTERNAL MEDICINE

## 2024-03-08 PROCEDURE — 3079F DIAST BP 80-89 MM HG: CPT | Mod: CPTII,S$GLB,, | Performed by: INTERNAL MEDICINE

## 2024-03-08 PROCEDURE — 3075F SYST BP GE 130 - 139MM HG: CPT | Mod: CPTII,S$GLB,, | Performed by: INTERNAL MEDICINE

## 2024-03-08 PROCEDURE — 3044F HG A1C LEVEL LT 7.0%: CPT | Mod: CPTII,S$GLB,, | Performed by: INTERNAL MEDICINE

## 2024-03-08 PROCEDURE — 1159F MED LIST DOCD IN RCRD: CPT | Mod: CPTII,S$GLB,, | Performed by: INTERNAL MEDICINE

## 2024-03-08 PROCEDURE — 99396 PREV VISIT EST AGE 40-64: CPT | Mod: S$GLB,,, | Performed by: INTERNAL MEDICINE

## 2024-03-08 NOTE — PROGRESS NOTES
Subjective:       Patient ID: Naima Jeffries is a 53 y.o. female.    Chief Complaint: Annual Exam    HPIPt is stressed as her mother is ill.  No CP or SOB. Wants eval for ADD.  Has hair loss.    Review of Systems   Respiratory:  Negative for shortness of breath (PND or orthopnea).    Cardiovascular:  Negative for chest pain (arm pain or jaw pain).   Gastrointestinal:  Negative for abdominal pain, diarrhea, nausea and vomiting.   Genitourinary:  Negative for dysuria.   Neurological:  Negative for seizures, syncope and headaches.       Objective:      Physical Exam  Constitutional:       General: She is not in acute distress.     Appearance: She is well-developed.   HENT:      Head: Normocephalic.   Eyes:      Pupils: Pupils are equal, round, and reactive to light.   Neck:      Thyroid: No thyromegaly.      Vascular: No JVD.   Cardiovascular:      Rate and Rhythm: Normal rate and regular rhythm.      Heart sounds: Normal heart sounds. No murmur heard.     No friction rub. No gallop.   Pulmonary:      Effort: Pulmonary effort is normal.      Breath sounds: Normal breath sounds. No wheezing or rales.   Abdominal:      General: Bowel sounds are normal. There is no distension.      Palpations: Abdomen is soft. There is no mass.      Tenderness: There is no abdominal tenderness. There is no guarding or rebound.   Musculoskeletal:      Cervical back: Neck supple.   Lymphadenopathy:      Cervical: No cervical adenopathy.   Skin:     General: Skin is warm and dry.   Neurological:      Mental Status: She is alert and oriented to person, place, and time.      Deep Tendon Reflexes: Reflexes are normal and symmetric.   Psychiatric:         Behavior: Behavior normal.         Thought Content: Thought content normal.         Judgment: Judgment normal.         Assessment:       1. Wellness examination    2. Attention deficit hyperactivity disorder (ADHD), unspecified ADHD type    3. Hair loss        Plan:   Wellness examination  -      Ambulatory referral/consult to Gynecology; Future; Expected date: 03/15/2024  -     CBC Auto Differential; Future; Expected date: 03/08/2024  -     Comprehensive Metabolic Panel; Future; Expected date: 03/08/2024  -     Lipid Panel; Future; Expected date: 03/08/2024  -     TSH; Future; Expected date: 03/08/2024  -     Hemoglobin A1C; Future; Expected date: 03/08/2024  -     Vitamin D; Future; Expected date: 03/08/2024  -     Vitamin B12; Future; Expected date: 04/08/2024  -     Methylmalonic Acid, Serum; Future; Expected date: 03/08/2024  -     ESTRADIOL; Future; Expected date: 03/08/2024  -     Follicle Stimulating Hormone; Future; Expected date: 03/08/2024  -     HCG, Quantitative; Future; Expected date: 03/08/2024  -     Luteinizing Hormone; Future; Expected date: 03/08/2024  -     Prolactin; Future; Expected date: 03/08/2024  -     Iron and TIBC; Future; Expected date: 03/08/2024  -     Ferritin; Future; Expected date: 03/08/2024  -     Mammo Digital Screening Bilat w/ Eder; Future; Expected date: 09/08/2024    Attention deficit hyperactivity disorder (ADHD), unspecified ADHD type  -     Ambulatory referral/consult to Psychiatry; Future; Expected date: 03/15/2024    Hair loss  -     Ambulatory referral/consult to Dermatology; Future; Expected date: 03/15/2024

## 2024-03-11 ENCOUNTER — LAB VISIT (OUTPATIENT)
Dept: LAB | Facility: HOSPITAL | Age: 54
End: 2024-03-11
Attending: INTERNAL MEDICINE
Payer: COMMERCIAL

## 2024-03-11 DIAGNOSIS — Z00.00 WELLNESS EXAMINATION: ICD-10-CM

## 2024-03-11 LAB
25(OH)D3+25(OH)D2 SERPL-MCNC: 31 NG/ML (ref 30–96)
ALBUMIN SERPL BCP-MCNC: 4 G/DL (ref 3.5–5.2)
ALP SERPL-CCNC: 34 U/L (ref 55–135)
ALT SERPL W/O P-5'-P-CCNC: 14 U/L (ref 10–44)
ANION GAP SERPL CALC-SCNC: 9 MMOL/L (ref 8–16)
AST SERPL-CCNC: 18 U/L (ref 10–40)
BASOPHILS # BLD AUTO: 0.05 K/UL (ref 0–0.2)
BASOPHILS NFR BLD: 1 % (ref 0–1.9)
BILIRUB SERPL-MCNC: 0.4 MG/DL (ref 0.1–1)
BUN SERPL-MCNC: 15 MG/DL (ref 6–20)
CALCIUM SERPL-MCNC: 9.4 MG/DL (ref 8.7–10.5)
CHLORIDE SERPL-SCNC: 104 MMOL/L (ref 95–110)
CHOLEST SERPL-MCNC: 220 MG/DL (ref 120–199)
CHOLEST/HDLC SERPL: 2.2 {RATIO} (ref 2–5)
CO2 SERPL-SCNC: 26 MMOL/L (ref 23–29)
CREAT SERPL-MCNC: 0.9 MG/DL (ref 0.5–1.4)
DIFFERENTIAL METHOD BLD: NORMAL
EOSINOPHIL # BLD AUTO: 0.2 K/UL (ref 0–0.5)
EOSINOPHIL NFR BLD: 3.1 % (ref 0–8)
ERYTHROCYTE [DISTWIDTH] IN BLOOD BY AUTOMATED COUNT: 13.9 % (ref 11.5–14.5)
EST. GFR  (NO RACE VARIABLE): >60 ML/MIN/1.73 M^2
ESTIMATED AVG GLUCOSE: 100 MG/DL (ref 68–131)
ESTRADIOL SERPL-MCNC: <10 PG/ML
FERRITIN SERPL-MCNC: 70 NG/ML (ref 20–300)
FSH SERPL-ACNC: 17 MIU/ML
GLUCOSE SERPL-MCNC: 96 MG/DL (ref 70–110)
HBA1C MFR BLD: 5.1 % (ref 4–5.6)
HCG INTACT+B SERPL-ACNC: <2.4 MIU/ML
HCT VFR BLD AUTO: 43 % (ref 37–48.5)
HDLC SERPL-MCNC: 98 MG/DL (ref 40–75)
HDLC SERPL: 44.5 % (ref 20–50)
HGB BLD-MCNC: 13.8 G/DL (ref 12–16)
IMM GRANULOCYTES # BLD AUTO: 0.01 K/UL (ref 0–0.04)
IMM GRANULOCYTES NFR BLD AUTO: 0.2 % (ref 0–0.5)
IRON SERPL-MCNC: 83 UG/DL (ref 30–160)
LDLC SERPL CALC-MCNC: 114.4 MG/DL (ref 63–159)
LH SERPL-ACNC: 6.6 MIU/ML
LYMPHOCYTES # BLD AUTO: 1.3 K/UL (ref 1–4.8)
LYMPHOCYTES NFR BLD: 27.5 % (ref 18–48)
MCH RBC QN AUTO: 29.9 PG (ref 27–31)
MCHC RBC AUTO-ENTMCNC: 32.1 G/DL (ref 32–36)
MCV RBC AUTO: 93 FL (ref 82–98)
MONOCYTES # BLD AUTO: 0.5 K/UL (ref 0.3–1)
MONOCYTES NFR BLD: 9.3 % (ref 4–15)
NEUTROPHILS # BLD AUTO: 2.8 K/UL (ref 1.8–7.7)
NEUTROPHILS NFR BLD: 58.9 % (ref 38–73)
NONHDLC SERPL-MCNC: 122 MG/DL
NRBC BLD-RTO: 0 /100 WBC
PLATELET # BLD AUTO: 220 K/UL (ref 150–450)
PMV BLD AUTO: 10.7 FL (ref 9.2–12.9)
POTASSIUM SERPL-SCNC: 4.5 MMOL/L (ref 3.5–5.1)
PROLACTIN SERPL IA-MCNC: 11.3 NG/ML (ref 5.2–26.5)
PROT SERPL-MCNC: 7.3 G/DL (ref 6–8.4)
RBC # BLD AUTO: 4.62 M/UL (ref 4–5.4)
SATURATED IRON: 21 % (ref 20–50)
SODIUM SERPL-SCNC: 139 MMOL/L (ref 136–145)
TOTAL IRON BINDING CAPACITY: 400 UG/DL (ref 250–450)
TRANSFERRIN SERPL-MCNC: 270 MG/DL (ref 200–375)
TRIGL SERPL-MCNC: 38 MG/DL (ref 30–150)
TSH SERPL DL<=0.005 MIU/L-ACNC: 1.76 UIU/ML (ref 0.4–4)
VIT B12 SERPL-MCNC: 313 PG/ML (ref 210–950)
WBC # BLD AUTO: 4.83 K/UL (ref 3.9–12.7)

## 2024-03-11 PROCEDURE — 82607 VITAMIN B-12: CPT | Performed by: INTERNAL MEDICINE

## 2024-03-11 PROCEDURE — 83002 ASSAY OF GONADOTROPIN (LH): CPT | Performed by: INTERNAL MEDICINE

## 2024-03-11 PROCEDURE — 82670 ASSAY OF TOTAL ESTRADIOL: CPT | Performed by: INTERNAL MEDICINE

## 2024-03-11 PROCEDURE — 85025 COMPLETE CBC W/AUTO DIFF WBC: CPT | Performed by: INTERNAL MEDICINE

## 2024-03-11 PROCEDURE — 83540 ASSAY OF IRON: CPT | Performed by: INTERNAL MEDICINE

## 2024-03-11 PROCEDURE — 84146 ASSAY OF PROLACTIN: CPT | Performed by: INTERNAL MEDICINE

## 2024-03-11 PROCEDURE — 82728 ASSAY OF FERRITIN: CPT | Performed by: INTERNAL MEDICINE

## 2024-03-11 PROCEDURE — 83036 HEMOGLOBIN GLYCOSYLATED A1C: CPT | Performed by: INTERNAL MEDICINE

## 2024-03-11 PROCEDURE — 36415 COLL VENOUS BLD VENIPUNCTURE: CPT | Mod: PN | Performed by: INTERNAL MEDICINE

## 2024-03-11 PROCEDURE — 80061 LIPID PANEL: CPT | Performed by: INTERNAL MEDICINE

## 2024-03-11 PROCEDURE — 83001 ASSAY OF GONADOTROPIN (FSH): CPT | Performed by: INTERNAL MEDICINE

## 2024-03-11 PROCEDURE — 83921 ORGANIC ACID SINGLE QUANT: CPT | Performed by: INTERNAL MEDICINE

## 2024-03-11 PROCEDURE — 80053 COMPREHEN METABOLIC PANEL: CPT | Performed by: INTERNAL MEDICINE

## 2024-03-11 PROCEDURE — 84702 CHORIONIC GONADOTROPIN TEST: CPT | Performed by: INTERNAL MEDICINE

## 2024-03-11 PROCEDURE — 82306 VITAMIN D 25 HYDROXY: CPT | Performed by: INTERNAL MEDICINE

## 2024-03-11 PROCEDURE — 84443 ASSAY THYROID STIM HORMONE: CPT | Performed by: INTERNAL MEDICINE

## 2024-03-14 LAB — METHYLMALONATE SERPL-SCNC: 0.18 UMOL/L

## 2024-04-15 ENCOUNTER — OFFICE VISIT (OUTPATIENT)
Dept: INTERNAL MEDICINE | Facility: CLINIC | Age: 54
End: 2024-04-15
Payer: COMMERCIAL

## 2024-04-15 DIAGNOSIS — E87.8 ELECTROLYTE ABNORMALITY: Primary | ICD-10-CM

## 2024-04-15 DIAGNOSIS — L65.9 HAIR LOSS: ICD-10-CM

## 2024-04-15 PROCEDURE — 99213 OFFICE O/P EST LOW 20 MIN: CPT | Mod: 95,,, | Performed by: INTERNAL MEDICINE

## 2024-04-15 PROCEDURE — 3044F HG A1C LEVEL LT 7.0%: CPT | Mod: CPTII,95,, | Performed by: INTERNAL MEDICINE

## 2024-04-15 RX ORDER — SPIRONOLACTONE 25 MG/1
25 TABLET ORAL DAILY
Qty: 30 TABLET | Refills: 3 | Status: SHIPPED | OUTPATIENT
Start: 2024-04-15 | End: 2025-04-15

## 2024-04-15 NOTE — PROGRESS NOTES
Subjective:       Patient ID: Naima Jeffries is a 53 y.o. female.    Chief Complaint: Hair loss    HPI The patient location is: [place], [place]  The chief complaint leading to consultation is: Hair loss    Visit type: audiovisual    Face to Face time with patient: 10   minutes of total time spent on the encounter, which includes face to face time and non-face to face time preparing to see the patient (eg, review of tests), Obtaining and/or reviewing separately obtained history, Documenting clinical information in the electronic or other health record, Independently interpreting results (not separately reported) and communicating results to the patient/family/caregiver, or Care coordination (not separately reported).         Each patient to whom he or she provides medical services by telemedicine is:  (1) informed of the relationship between the physician and patient and the respective role of any other health care provider with respect to management of the patient; and (2) notified that he or she may decline to receive medical services by telemedicine and may withdraw from such care at any time.    Notes:  Pt with thinning hair - had normal labs - she wants to try spironolactone as discussed at last visit.   Review of Systems   Respiratory:  Negative for shortness of breath (PND or orthopnea).    Cardiovascular:  Negative for chest pain (arm pain or jaw pain).   Gastrointestinal:  Negative for abdominal pain, diarrhea, nausea and vomiting.   Genitourinary:  Negative for dysuria.   Neurological:  Negative for seizures, syncope and headaches.       Objective:      Physical Exam  Pt showed me thin hair but no bald spots  Assessment:       1. Electrolyte abnormality        Plan:   Electrolyte abnormality  -     Basic Metabolic Panel; Future; Expected date: 04/15/2024    Hair loss  -     spironolactone (ALDACTONE) 25 MG tablet; Take 1 tablet (25 mg total) by mouth once daily.  Dispense: 30 tablet; Refill: 3  Check BMP in one week  and one month

## 2024-04-17 ENCOUNTER — TELEPHONE (OUTPATIENT)
Dept: INTERNAL MEDICINE | Facility: CLINIC | Age: 54
End: 2024-04-17
Payer: COMMERCIAL

## 2024-04-17 NOTE — TELEPHONE ENCOUNTER
----- Message from Leatha Das sent at 4/17/2024  9:07 AM CDT -----  Pt is requesting a derm referral. Thanks

## 2024-04-17 NOTE — TELEPHONE ENCOUNTER
Called patient. No answer. Left a voice message requesting callback to discuss reason for referral request.    Patient is requesting a dermatology referral

## 2024-04-22 ENCOUNTER — LAB VISIT (OUTPATIENT)
Dept: LAB | Facility: HOSPITAL | Age: 54
End: 2024-04-22
Attending: INTERNAL MEDICINE
Payer: COMMERCIAL

## 2024-04-22 DIAGNOSIS — E87.8 ELECTROLYTE ABNORMALITY: ICD-10-CM

## 2024-04-22 LAB
ANION GAP SERPL CALC-SCNC: 7 MMOL/L (ref 8–16)
BUN SERPL-MCNC: 12 MG/DL (ref 6–20)
CALCIUM SERPL-MCNC: 9.8 MG/DL (ref 8.7–10.5)
CHLORIDE SERPL-SCNC: 103 MMOL/L (ref 95–110)
CO2 SERPL-SCNC: 29 MMOL/L (ref 23–29)
CREAT SERPL-MCNC: 0.9 MG/DL (ref 0.5–1.4)
EST. GFR  (NO RACE VARIABLE): >60 ML/MIN/1.73 M^2
GLUCOSE SERPL-MCNC: 94 MG/DL (ref 70–110)
POTASSIUM SERPL-SCNC: 4.6 MMOL/L (ref 3.5–5.1)
SODIUM SERPL-SCNC: 139 MMOL/L (ref 136–145)

## 2024-04-22 PROCEDURE — 80048 BASIC METABOLIC PNL TOTAL CA: CPT | Performed by: INTERNAL MEDICINE

## 2024-04-22 PROCEDURE — 36415 COLL VENOUS BLD VENIPUNCTURE: CPT | Mod: PN | Performed by: INTERNAL MEDICINE

## 2024-04-30 ENCOUNTER — HOSPITAL ENCOUNTER (OUTPATIENT)
Dept: RADIOLOGY | Facility: HOSPITAL | Age: 54
Discharge: HOME | End: 2024-04-30
Attending: INTERNAL MEDICINE
Payer: COMMERCIAL

## 2024-04-30 VITALS — HEIGHT: 67 IN | WEIGHT: 160 LBS | BODY MASS INDEX: 25.11 KG/M2

## 2024-04-30 DIAGNOSIS — Z00.00 WELLNESS EXAMINATION: ICD-10-CM

## 2024-04-30 PROCEDURE — 77063 BREAST TOMOSYNTHESIS BI: CPT | Mod: 26,,, | Performed by: RADIOLOGY

## 2024-04-30 PROCEDURE — 77067 SCR MAMMO BI INCL CAD: CPT | Mod: 26,,, | Performed by: RADIOLOGY

## 2024-04-30 PROCEDURE — 77063 BREAST TOMOSYNTHESIS BI: CPT | Mod: TC

## 2024-05-06 ENCOUNTER — PATIENT MESSAGE (OUTPATIENT)
Dept: INTERNAL MEDICINE | Facility: CLINIC | Age: 54
End: 2024-05-06
Payer: COMMERCIAL

## 2024-05-10 RX ORDER — ESCITALOPRAM OXALATE 10 MG/1
10 TABLET ORAL
Qty: 90 TABLET | Refills: 3 | Status: SHIPPED | OUTPATIENT
Start: 2024-05-10

## 2024-05-10 NOTE — TELEPHONE ENCOUNTER
No care due was identified.  [facility] Embedded Care Due Messages. Reference number: [id number].   5/10/2024 11:09:44 AM CDT

## 2024-05-10 NOTE — TELEPHONE ENCOUNTER
Refill Decision Note   Naima Jeffries  is requesting a refill authorization.  Brief Assessment and Rationale for Refill:  Approve     Medication Therapy Plan:         Comments:     Note composed:2:03 PM 05/10/2024

## 2024-05-13 ENCOUNTER — OFFICE VISIT (OUTPATIENT)
Dept: OBSTETRICS AND GYNECOLOGY | Facility: CLINIC | Age: 54
End: 2024-05-13
Attending: OBSTETRICS & GYNECOLOGY
Payer: COMMERCIAL

## 2024-05-13 VITALS
BODY MASS INDEX: 23.95 KG/M2 | SYSTOLIC BLOOD PRESSURE: 137 MMHG | HEIGHT: 68 IN | WEIGHT: 158 LBS | HEART RATE: 73 BPM | DIASTOLIC BLOOD PRESSURE: 89 MMHG

## 2024-05-13 DIAGNOSIS — N95.1 MENOPAUSAL SYMPTOM: Primary | ICD-10-CM

## 2024-05-13 DIAGNOSIS — Z00.00 WELLNESS EXAMINATION: ICD-10-CM

## 2024-05-13 DIAGNOSIS — N92.6 IRREGULAR MENSES: ICD-10-CM

## 2024-05-13 LAB
B-HCG UR QL: NEGATIVE
CTP QC/QA: YES

## 2024-05-13 PROCEDURE — 3079F DIAST BP 80-89 MM HG: CPT | Mod: CPTII,S$GLB,, | Performed by: OBSTETRICS & GYNECOLOGY

## 2024-05-13 PROCEDURE — 81025 URINE PREGNANCY TEST: CPT | Mod: S$GLB,,, | Performed by: OBSTETRICS & GYNECOLOGY

## 2024-05-13 PROCEDURE — 3044F HG A1C LEVEL LT 7.0%: CPT | Mod: CPTII,S$GLB,, | Performed by: OBSTETRICS & GYNECOLOGY

## 2024-05-13 PROCEDURE — 1159F MED LIST DOCD IN RCRD: CPT | Mod: CPTII,S$GLB,, | Performed by: OBSTETRICS & GYNECOLOGY

## 2024-05-13 PROCEDURE — 99999 PR PBB SHADOW E&M-EST. PATIENT-LVL III: CPT | Mod: PBBFAC,,, | Performed by: OBSTETRICS & GYNECOLOGY

## 2024-05-13 PROCEDURE — 3075F SYST BP GE 130 - 139MM HG: CPT | Mod: CPTII,S$GLB,, | Performed by: OBSTETRICS & GYNECOLOGY

## 2024-05-13 PROCEDURE — 3008F BODY MASS INDEX DOCD: CPT | Mod: CPTII,S$GLB,, | Performed by: OBSTETRICS & GYNECOLOGY

## 2024-05-13 PROCEDURE — 99214 OFFICE O/P EST MOD 30 MIN: CPT | Mod: S$GLB,,, | Performed by: OBSTETRICS & GYNECOLOGY

## 2024-05-13 RX ORDER — LACTIC ACID, L-, CITRIC ACID MONOHYDRATE, AND POTASSIUM BITARTRATE 90; 50; 20 MG/5G; MG/5G; MG/5G
1 GEL VAGINAL
Qty: 5 G | Refills: 11 | Status: SHIPPED | OUTPATIENT
Start: 2024-05-13

## 2024-05-13 RX ORDER — ESTRADIOL 0.03 MG/D
1 FILM, EXTENDED RELEASE TRANSDERMAL
Qty: 8 PATCH | Refills: 11 | Status: SHIPPED | OUTPATIENT
Start: 2024-05-13

## 2024-05-13 RX ORDER — PROGESTERONE 100 MG/1
100 CAPSULE ORAL NIGHTLY
Qty: 30 CAPSULE | Refills: 11 | Status: SHIPPED | OUTPATIENT
Start: 2024-05-13 | End: 2025-05-13

## 2024-05-13 NOTE — PROGRESS NOTES
Subjective:      Naima Jeffries is a 53 y.o. female who presents to discuss hormone replacement therapy.  Menarche occurred at age 13. She reports having irregular cycles- longest is about 3 months without a cycle. She reports issues with focus.  She had a 3 year divorce- stressful. She reports hair loss and is taking meds prescribed by PCP , Spironolactone. She also reports weight gain. She has less energy. She does not have hot flashes or night sweats. She reports feeling hot at night but that is not new.   She denies the following contraindications to HRT:  Vaginal bleeding, history of VTE/PE, thrombophilia,  breast cancer, or active liver disease.     She reports difficulty concentrating.   Her periods last 3-4 days can vary between heavy and light. She is not using contraception currently. She had IUD removed  Isela Florian, MD           [unfilled]   Hemoglobin A1C   Date Value Ref Range Status   03/11/2024 5.1 4.0 - 5.6 % Final     Comment:     ADA Screening Guidelines:  5.7-6.4%  Consistent with prediabetes  >or=6.5%  Consistent with diabetes    High levels of fetal hemoglobin interfere with the HbA1C  assay. Heterozygous hemoglobin variants (HbS, HgC, etc)do  not significantly interfere with this assay.   However, presence of multiple variants may affect accuracy.     03/22/2023 5.1 4.0 - 5.6 % Final     Comment:     ADA Screening Guidelines:  5.7-6.4%  Consistent with prediabetes  >or=6.5%  Consistent with diabetes    High levels of fetal hemoglobin interfere with the HbA1C  assay. Heterozygous hemoglobin variants (HbS, HgC, etc)do  not significantly interfere with this assay.   However, presence of multiple variants may affect accuracy.     11/05/2021 5.1 4.0 - 5.6 % Final     Comment:     ADA Screening Guidelines:  5.7-6.4%  Consistent with prediabetes  >or=6.5%  Consistent with diabetes    High levels of fetal hemoglobin interfere with the HbA1C  assay. Heterozygous hemoglobin variants  (HbS, HgC, etc)do  not significantly interfere with this assay.   However, presence of multiple variants may affect accuracy.             Pap smear: 8/17/2021  Mammogram: 4/30/2024 normal    Labs 3/11/2024  FSH 17  Estradiol <10  LH 6.6    Lab Visit on 04/22/2024   Component Date Value Ref Range Status    Sodium 04/22/2024 139  136 - 145 mmol/L Final    Potassium 04/22/2024 4.6  3.5 - 5.1 mmol/L Final    Chloride 04/22/2024 103  95 - 110 mmol/L Final    CO2 04/22/2024 29  23 - 29 mmol/L Final    Glucose 04/22/2024 94  70 - 110 mg/dL Final    BUN 04/22/2024 12  6 - 20 mg/dL Final    Creatinine 04/22/2024 0.9  0.5 - 1.4 mg/dL Final    Calcium 04/22/2024 9.8  8.7 - 10.5 mg/dL Final    Anion Gap 04/22/2024 7 (L)  8 - 16 mmol/L Final    eGFR 04/22/2024 >60.0  >60 mL/min/1.73 m^2 Final   Lab Visit on 03/11/2024   Component Date Value Ref Range Status    WBC 03/11/2024 4.83  3.90 - 12.70 K/uL Final    RBC 03/11/2024 4.62  4.00 - 5.40 M/uL Final    Hemoglobin 03/11/2024 13.8  12.0 - 16.0 g/dL Final    Hematocrit 03/11/2024 43.0  37.0 - 48.5 % Final    MCV 03/11/2024 93  82 - 98 fL Final    MCH 03/11/2024 29.9  27.0 - 31.0 pg Final    MCHC 03/11/2024 32.1  32.0 - 36.0 g/dL Final    RDW 03/11/2024 13.9  11.5 - 14.5 % Final    Platelets 03/11/2024 220  150 - 450 K/uL Final    MPV 03/11/2024 10.7  9.2 - 12.9 fL Final    Immature Granulocytes 03/11/2024 0.2  0.0 - 0.5 % Final    Gran # (ANC) 03/11/2024 2.8  1.8 - 7.7 K/uL Final    Immature Grans (Abs) 03/11/2024 0.01  0.00 - 0.04 K/uL Final    Lymph # 03/11/2024 1.3  1.0 - 4.8 K/uL Final    Mono # 03/11/2024 0.5  0.3 - 1.0 K/uL Final    Eos # 03/11/2024 0.2  0.0 - 0.5 K/uL Final    Baso # 03/11/2024 0.05  0.00 - 0.20 K/uL Final    nRBC 03/11/2024 0  0 /100 WBC Final    Gran % 03/11/2024 58.9  38.0 - 73.0 % Final    Lymph % 03/11/2024 27.5  18.0 - 48.0 % Final    Mono % 03/11/2024 9.3  4.0 - 15.0 % Final    Eosinophil % 03/11/2024 3.1  0.0 - 8.0 % Final    Basophil % 03/11/2024  1.0  0.0 - 1.9 % Final    Differential Method 03/11/2024 Automated   Final    Sodium 03/11/2024 139  136 - 145 mmol/L Final    Potassium 03/11/2024 4.5  3.5 - 5.1 mmol/L Final    Chloride 03/11/2024 104  95 - 110 mmol/L Final    CO2 03/11/2024 26  23 - 29 mmol/L Final    Glucose 03/11/2024 96  70 - 110 mg/dL Final    BUN 03/11/2024 15  6 - 20 mg/dL Final    Creatinine 03/11/2024 0.9  0.5 - 1.4 mg/dL Final    Calcium 03/11/2024 9.4  8.7 - 10.5 mg/dL Final    Total Protein 03/11/2024 7.3  6.0 - 8.4 g/dL Final    Albumin 03/11/2024 4.0  3.5 - 5.2 g/dL Final    Total Bilirubin 03/11/2024 0.4  0.1 - 1.0 mg/dL Final    Alkaline Phosphatase 03/11/2024 34 (L)  55 - 135 U/L Final    AST 03/11/2024 18  10 - 40 U/L Final    ALT 03/11/2024 14  10 - 44 U/L Final    eGFR 03/11/2024 >60.0  >60 mL/min/1.73 m^2 Final    Anion Gap 03/11/2024 9  8 - 16 mmol/L Final    Cholesterol 03/11/2024 220 (H)  120 - 199 mg/dL Final    Triglycerides 03/11/2024 38  30 - 150 mg/dL Final    HDL 03/11/2024 98 (H)  40 - 75 mg/dL Final    LDL Cholesterol 03/11/2024 114.4  63.0 - 159.0 mg/dL Final    HDL/Cholesterol Ratio 03/11/2024 44.5  20.0 - 50.0 % Final    Total Cholesterol/HDL Ratio 03/11/2024 2.2  2.0 - 5.0 Final    Non-HDL Cholesterol 03/11/2024 122  mg/dL Final    TSH 03/11/2024 1.765  0.400 - 4.000 uIU/mL Final    Hemoglobin A1C 03/11/2024 5.1  4.0 - 5.6 % Final    Estimated Avg Glucose 03/11/2024 100  68 - 131 mg/dL Final    Vit D, 25-Hydroxy 03/11/2024 31  30 - 96 ng/mL Final    Vitamin B-12 03/11/2024 313  210 - 950 pg/mL Final    Methlymalonic Acid 03/11/2024 0.18  <0.40 umol/L Final    Estradiol 03/11/2024 <10 (A)  See Text pg/mL Final    Follicle Stimulating Hormone 03/11/2024 17.00  See Text mIU/mL Final    HCG Quant 03/11/2024 <2.4  See Text mIU/mL Final    LH 03/11/2024 6.6  See Text mIU/mL Final    Prolactin 03/11/2024 11.3  5.2 - 26.5 ng/mL Final    Iron 03/11/2024 83  30 - 160 ug/dL Final    Transferrin 03/11/2024 270  200 - 375  mg/dL Final    TIBC 2024 400  250 - 450 ug/dL Final    Saturated Iron 2024 21  20 - 50 % Final    Ferritin 2024 70  20.0 - 300.0 ng/mL Final       Past Medical History:   Diagnosis Date    Abnormal Pap smear of cervix     Anxiety     Depression     Fever blister     S/P D&C (status post dilation and curettage)      Past Surgical History:   Procedure Laterality Date    BREAST BIOPSY Right 2018    benign    COLONOSCOPY N/A 2022    Procedure: COLONOSCOPY;  Surgeon: Akua Haider MD;  [facility]  Service: Endoscopy;  Laterality: N/A;  original order from Dr Florian on 21-unusable. new order placed        vaccinated-GT    COLONOSCOPY N/A 2022    Procedure: COLONOSCOPY;  Surgeon: Gael Rico MD;  Location: [facility] ENDO (4TH FLR);  Service: Endoscopy;  Laterality: N/A;  old case request unusable, new case request placed  fully vaccinated, instructions emailed to [email or web address]  [email or web address]. pre call complete A.s    EXCISION OF PTERYGIUM Right     LASER ABLATION OF THE CERVIX       Social History     Tobacco Use    Smoking status: Never     Passive exposure: Never    Smokeless tobacco: Never   Substance Use Topics    Alcohol use: Yes     Comment: socially    Drug use: Never     Family History   Problem Relation Name Age of Onset    Stroke Mother      Breast cancer Paternal Aunt      Breast cancer Cousin paternal     Colon cancer Neg Hx      Ovarian cancer Neg Hx      Uterine cancer Neg Hx      Cervical cancer Neg Hx      Cancer Neg Hx       OB History    Para Term  AB Living   4 2 1 1 2 2   SAB IAB Ectopic Multiple Live Births   1       2      # Outcome Date GA Lbr Solomon/2nd Weight Sex Type Anes PTL Lv   4 Term 03/25/15    M Vag-Spont   JOE   3 SAB      SAB      2  09/10/11 35w0d   M Vag-Spont   JOE   1 AB                Current Outpatient Medications:     EScitalopram oxalate (LEXAPRO) 10 MG tablet, TAKE ONE TABLET  "BY MOUTH ONE TIME DAILY, Disp: 90 tablet, Rfl: 3    spironolactone (ALDACTONE) 25 MG tablet, Take 1 tablet (25 mg total) by mouth once daily., Disp: 30 tablet, Rfl: 3    Vitals:    05/13/24 1137   BP: 137/89   Pulse: 73   Weight: 71.7 kg (158 lb)   Height: 5' 8" (1.727 m)   PainSc:   4   PainLoc: Hip     Body mass index is 24.02 kg/m².    ROS:  Constitutional: no weight loss, +weight gain, fever, +fatigue  Eyes:  No vision changes, glasses/contacts  ENT/Mouth: No ulcers, sinus problems, ears ringing, headache  Cardiovascular: No inability to lie flat, chest pain, exercise intolerance, swelling, heart palpitations  Respiratory: No wheezing, coughing blood, shortness of breath, or cough  Gastrointestinal: No diarrhea, bloody stool, nausea/vomiting, constipation, gas, hemorrhoids  Genitourinary: No blood in urine, painful urination, urgency of urination, frequency of urination, incomplete emptying, incontinence, abnormal bleeding, painful periods, heavy periods, vaginal discharge, vaginal odor, painful intercourse, sexual problems, bleeding after intercourse.  Musculoskeletal: No muscle weakness  Skin/Breast: No painful breasts, nipple discharge, masses, rash, ulcers  Neurological: No passing out, seizures, numbness, headache  Endocrine: No diabetes, hypothyroid, hyperthyroid, hot flashes, +hair loss, abnormal hair growth, ance  Psychiatric: No depression, crying  Hematologic: No bruises, bleeding, swollen lymph nodes, anemia.    Assessment:     Menopausal symptoms  Irregular menses  Plan:   Risks and benefits of hormone replacement therapy were discussed.  Hormone replacement therapy options, including bioidentical versus non-bioidentical hormones, as well as alternatives discussed.    Start:      Estradiol Vivelle Dot .025mg/d twice weekly   Progesterone 100 mg orally QPM   Counseled her that she is perimenopausal so there is a risk of bleeding. She also needs contraception  Phexxi discussed. She can also use " condoms. Would not recommend OCPs          Follow up in 3 months   Total time 30 minutes. Face to face, review of medical record and arranging follow up

## 2024-05-28 ENCOUNTER — LAB VISIT (OUTPATIENT)
Dept: LAB | Facility: HOSPITAL | Age: 54
End: 2024-05-28
Attending: INTERNAL MEDICINE
Payer: COMMERCIAL

## 2024-05-28 DIAGNOSIS — E87.8 ELECTROLYTE ABNORMALITY: ICD-10-CM

## 2024-05-28 LAB
ANION GAP SERPL CALC-SCNC: 13 MMOL/L (ref 8–16)
BUN SERPL-MCNC: 10 MG/DL (ref 6–20)
CALCIUM SERPL-MCNC: 9.5 MG/DL (ref 8.7–10.5)
CHLORIDE SERPL-SCNC: 102 MMOL/L (ref 95–110)
CO2 SERPL-SCNC: 22 MMOL/L (ref 23–29)
CREAT SERPL-MCNC: 0.9 MG/DL (ref 0.5–1.4)
EST. GFR  (NO RACE VARIABLE): >60 ML/MIN/1.73 M^2
GLUCOSE SERPL-MCNC: 84 MG/DL (ref 70–110)
POTASSIUM SERPL-SCNC: 4.5 MMOL/L (ref 3.5–5.1)
SODIUM SERPL-SCNC: 137 MMOL/L (ref 136–145)

## 2024-05-28 PROCEDURE — 36415 COLL VENOUS BLD VENIPUNCTURE: CPT | Mod: PN | Performed by: INTERNAL MEDICINE

## 2024-05-28 PROCEDURE — 80048 BASIC METABOLIC PNL TOTAL CA: CPT | Performed by: INTERNAL MEDICINE

## 2024-07-16 ENCOUNTER — OFFICE VISIT (OUTPATIENT)
Dept: OBSTETRICS AND GYNECOLOGY | Facility: CLINIC | Age: 54
End: 2024-07-16
Payer: COMMERCIAL

## 2024-07-16 VITALS
SYSTOLIC BLOOD PRESSURE: 116 MMHG | BODY MASS INDEX: 24.49 KG/M2 | WEIGHT: 161.63 LBS | DIASTOLIC BLOOD PRESSURE: 72 MMHG | HEIGHT: 68 IN

## 2024-07-16 DIAGNOSIS — N76.0 ACUTE VAGINITIS: Primary | ICD-10-CM

## 2024-07-16 PROCEDURE — 3008F BODY MASS INDEX DOCD: CPT | Mod: CPTII,S$GLB,, | Performed by: NURSE PRACTITIONER

## 2024-07-16 PROCEDURE — 99999 PR PBB SHADOW E&M-EST. PATIENT-LVL III: CPT | Mod: PBBFAC,,, | Performed by: NURSE PRACTITIONER

## 2024-07-16 PROCEDURE — 3044F HG A1C LEVEL LT 7.0%: CPT | Mod: CPTII,S$GLB,, | Performed by: NURSE PRACTITIONER

## 2024-07-16 PROCEDURE — 81514 NFCT DS BV&VAGINITIS DNA ALG: CPT | Performed by: NURSE PRACTITIONER

## 2024-07-16 PROCEDURE — 99213 OFFICE O/P EST LOW 20 MIN: CPT | Mod: S$GLB,,, | Performed by: NURSE PRACTITIONER

## 2024-07-16 PROCEDURE — 87491 CHLMYD TRACH DNA AMP PROBE: CPT | Performed by: NURSE PRACTITIONER

## 2024-07-16 PROCEDURE — 1159F MED LIST DOCD IN RCRD: CPT | Mod: CPTII,S$GLB,, | Performed by: NURSE PRACTITIONER

## 2024-07-16 PROCEDURE — 3074F SYST BP LT 130 MM HG: CPT | Mod: CPTII,S$GLB,, | Performed by: NURSE PRACTITIONER

## 2024-07-16 PROCEDURE — 3078F DIAST BP <80 MM HG: CPT | Mod: CPTII,S$GLB,, | Performed by: NURSE PRACTITIONER

## 2024-07-16 RX ORDER — FLUCONAZOLE 150 MG/1
150 TABLET ORAL ONCE
Qty: 2 TABLET | Refills: 1 | Status: SHIPPED | OUTPATIENT
Start: 2024-07-16 | End: 2024-07-16

## 2024-07-16 RX ORDER — METRONIDAZOLE 7.5 MG/G
1 GEL VAGINAL DAILY
Qty: 70 G | Refills: 0 | Status: SHIPPED | OUTPATIENT
Start: 2024-07-16 | End: 2024-07-21

## 2024-07-16 NOTE — TELEPHONE ENCOUNTER
----- Message from Felicity De La Cruz sent at 7/16/2024  8:24 AM CDT -----  Regarding: refill  Type:  RX Refill Request    Who Called: Naima  Refill or New Rx:refill  RX Name and Strength:EScitalopram oxalate (LEXAPRO) 10 MG tablet  How is the patient currently taking it? (ex. 1XDay):  Is this a 30 day or 90 day RX:  Preferred Pharmacy with phone number:[facility] [id number]  Local or Mail Order:local  Ordering Provider:  Would the patient rather a call back or a response via [facility]? call  Best Call Back Number: [phone]  Additional Information:

## 2024-07-16 NOTE — PROGRESS NOTES
CC: Vaginal Burning     Naima Jeffries is a 53 y.o. female  presents with complaint of vaginal burning. Reports partner lives in [place] and he is having some white DC to his penis.   She denies itching.  denies odor. Alleviating factors: none. No new sexual partners.  She is still having irregular cycles. Reports cycle started after onset of symptoms. She needs refill of Diflucan.   She is on HRT - estrogen patch with oral progesterone. Reports the progesterone is causing her to feel fatigue - which persists the next day.       ROS:  GENERAL: No fever, chills, fatigability or weight loss.  VULVAR: No pain, no lesions and no itching.  VAGINAL: No relaxation, no itching, no discharge, no abnormal bleeding and no lesions.  ABDOMEN: No abdominal pain. Denies nausea. Denies vomiting. No diarrhea. No constipation  BREAST: Denies pain. No lumps. No discharge.  URINARY: No incontinence, no nocturia, no frequency and no dysuria.  CARDIOVASCULAR: No chest pain. No shortness of breath. No leg cramps.  NEUROLOGICAL: No headaches. No vision changes.    PHYSICAL EXAM:  VULVA: normal appearing vulva with no masses, tenderness or lesions   VAGINA: normal appearing vagina with normal color and discharge, no lesions   CERVIX: normal appearing cervix without discharge or lesions   UTERUS: uterus is normal size, shape, consistency and nontender   ADNEXA: normal adnexa in size, nontender and no masses    ASSESSMENT and PLAN:    ICD-10-CM ICD-9-CM    1. Acute vaginitis  N76.0 616.10 fluconazole (DIFLUCAN) 150 MG Tab      metroNIDAZOLE (METROGEL VAGINAL) 0.75 % (37.5mg/5 gram) vaginal gel      C. trachomatis/N. gonorrhoeae by AMP DNA      Vaginosis Screen by DNA Probe      boric acid (BORIC ACID) vaginal suppository        Vaginosis cx  GCCT  Metrogel for suspected BV  Diflucan refilled  Boric Acid Suppositories sent insert nightly at bedtime as needed for 1 -2 nights at symptom onset to help stabilize the vaginal pH and improve  symptoms   Discussed since still has uterus in place - while on HRT containing estrogen - needs the progesterone to protect uterine lining - pt has follow up with Dr. Gonzalez to discuss HRT     Patient was counseled today on vaginitis prevention including :  a. avoiding feminine products such as deoderant soaps, body wash, bubble bath, douches, scented toilet paper, deoderant tampons or pads, feminine wipes, chronic pad use, etc.  b. avoiding other vulvovaginal irritants such as long hot baths, humidity, tight, synthetic clothing, chlorine and sitting around in wet bathing suits  c. wearing cotton underwear, avoiding thong underwear and no underwear to bed  d. taking showers instead of baths and use a hair dryer on cool setting afterwards to dry  e. wearing cotton to exercise and shower immediately after exercise and change clothes  f. using polyurethane condoms without spermicide if sexually active and symptoms are triggered by intercourse    FOLLOW UP: PRN lack of improvement.    Dayana Sun, FINNP-C

## 2024-07-16 NOTE — TELEPHONE ENCOUNTER
No care due was identified.  [facility] Embedded Care Due Messages. Reference number: [id number].   7/16/2024 8:36:51 AM CDT

## 2024-07-17 ENCOUNTER — PATIENT MESSAGE (OUTPATIENT)
Dept: OBSTETRICS AND GYNECOLOGY | Facility: CLINIC | Age: 54
End: 2024-07-17
Payer: COMMERCIAL

## 2024-07-18 LAB
BACTERIAL VAGINOSIS DNA: POSITIVE
CANDIDA GLABRATA DNA: NEGATIVE
CANDIDA KRUSEI DNA: NEGATIVE
CANDIDA RRNA VAG QL PROBE: POSITIVE
T VAGINALIS RRNA GENITAL QL PROBE: NEGATIVE

## 2024-07-19 ENCOUNTER — PATIENT MESSAGE (OUTPATIENT)
Dept: OBSTETRICS AND GYNECOLOGY | Facility: CLINIC | Age: 54
End: 2024-07-19
Payer: COMMERCIAL

## 2024-07-19 LAB
C TRACH DNA SPEC QL NAA+PROBE: NOT DETECTED
N GONORRHOEA DNA SPEC QL NAA+PROBE: NOT DETECTED

## 2024-07-21 RX ORDER — ESCITALOPRAM OXALATE 10 MG/1
10 TABLET ORAL DAILY
Qty: 90 TABLET | Refills: 3 | Status: SHIPPED | OUTPATIENT
Start: 2024-07-21

## 2024-09-18 ENCOUNTER — LAB VISIT (OUTPATIENT)
Dept: LAB | Facility: HOSPITAL | Age: 54
End: 2024-09-18
Attending: PHYSICIAN ASSISTANT
Payer: COMMERCIAL

## 2024-09-18 ENCOUNTER — OFFICE VISIT (OUTPATIENT)
Dept: OBSTETRICS AND GYNECOLOGY | Facility: CLINIC | Age: 54
End: 2024-09-18
Payer: COMMERCIAL

## 2024-09-18 DIAGNOSIS — N95.1 MENOPAUSAL SYMPTOM: Primary | ICD-10-CM

## 2024-09-18 DIAGNOSIS — N95.1 MENOPAUSAL SYMPTOM: ICD-10-CM

## 2024-09-18 LAB
ESTRADIOL SERPL-MCNC: <10 PG/ML
FSH SERPL-ACNC: 46.54 MIU/ML
PROGEST SERPL-MCNC: 2.4 NG/ML
TESTOST SERPL-MCNC: 41 NG/DL (ref 5–73)

## 2024-09-18 PROCEDURE — 82670 ASSAY OF TOTAL ESTRADIOL: CPT | Performed by: PHYSICIAN ASSISTANT

## 2024-09-18 PROCEDURE — 84402 ASSAY OF FREE TESTOSTERONE: CPT | Performed by: PHYSICIAN ASSISTANT

## 2024-09-18 PROCEDURE — 84144 ASSAY OF PROGESTERONE: CPT | Performed by: PHYSICIAN ASSISTANT

## 2024-09-18 PROCEDURE — 99214 OFFICE O/P EST MOD 30 MIN: CPT | Mod: 95,,, | Performed by: PHYSICIAN ASSISTANT

## 2024-09-18 PROCEDURE — 36415 COLL VENOUS BLD VENIPUNCTURE: CPT | Mod: PN | Performed by: PHYSICIAN ASSISTANT

## 2024-09-18 PROCEDURE — 1159F MED LIST DOCD IN RCRD: CPT | Mod: CPTII,95,, | Performed by: PHYSICIAN ASSISTANT

## 2024-09-18 PROCEDURE — 1160F RVW MEDS BY RX/DR IN RCRD: CPT | Mod: CPTII,95,, | Performed by: PHYSICIAN ASSISTANT

## 2024-09-18 PROCEDURE — 3044F HG A1C LEVEL LT 7.0%: CPT | Mod: CPTII,95,, | Performed by: PHYSICIAN ASSISTANT

## 2024-09-18 PROCEDURE — 83001 ASSAY OF GONADOTROPIN (FSH): CPT | Performed by: PHYSICIAN ASSISTANT

## 2024-09-18 PROCEDURE — 84403 ASSAY OF TOTAL TESTOSTERONE: CPT | Performed by: PHYSICIAN ASSISTANT

## 2024-09-18 RX ORDER — SPIRONOLACTONE 50 MG/1
50 TABLET, FILM COATED ORAL DAILY
Qty: 30 TABLET | Refills: 11 | Status: SHIPPED | OUTPATIENT
Start: 2024-09-18 | End: 2025-09-18

## 2024-09-18 NOTE — PROGRESS NOTES
The patient location is: home  The chief complaint leading to consultation is: HRT follow up    Visit type: audiovisual    Face to Face time with patient: 20 minutes  30 minutes of total time spent on the encounter, which includes face to face time and non-face to face time preparing to see the patient (eg, review of tests), Obtaining and/or reviewing separately obtained history, Documenting clinical information in the electronic or other health record, Independently interpreting results (not separately reported) and communicating results to the patient/family/caregiver, or Care coordination (not separately reported).         Each patient to whom he or she provides medical services by telemedicine is:  (1) informed of the relationship between the physician and patient and the respective role of any other health care provider with respect to management of the patient; and (2) notified that he or she may decline to receive medical services by telemedicine and may withdraw from such care at any time.    Notes:    Subjective:      Naima Jeffries is a 54 y.o. female who presents for follow-up of hormone replacement therapy for perimenopause.  At her last visit on 5/13/2024 with Dr. Gonzalez, she reported decreased focus, hair loss on spironolactone 25mg QD, weight gain, fatigue and feeling hot at night. Periods are irregular.   Longest time without a period is about 3 months.    PLAN on 5/13/2024:  Estradiol Vivelle Dot .025mg/d twice weekly  Progesterone 100 mg orally QPM    The patient reports that she was feeling groggy in the AM with progesterone. Started taking earlier in the evening and feeling better. Thinks she is feeling better but unsure. Continues to have brain fog, fatigue, and weight gain. She is interested in transdermal testosterone and questions about this. Decreased exercise recently due to injury. Has considered GLP-1 for weight loss as well. Had spotting in 5/2024 but has not had period since.  Taking  spironolactone 25mg QD for hair loss. Has not noticed anymore hair loss but no growth either.    PCP: Isela Florian MD    Routine labs: 3/11/2024  WWE: 8/10/2021  Pap smear: 8/17/2021  Mammogram: 4/30/2024  DEXA:   Colonoscopy: 11/9/2022 repeat in 10 years    Office Visit on 07/16/2024   Component Date Value Ref Range Status    Chlamydia, Amplified DNA 07/16/2024 Not Detected  Not Detected Final    N gonorrhoeae, amplified DNA 07/16/2024 Not Detected  Not Detected Final    Trichomonas vaginalis 07/16/2024 Negative  Negative Final    Candida sp 07/16/2024 Positive (A)  Negative Final    Camelia glabrata DNA 07/16/2024 Negative  Negative Final    Camelia krusei DNA 07/16/2024 Negative  Negative Final    Bacterial vaginosis DNA 07/16/2024 Positive (A)  Negative Final       Past Medical History:   Diagnosis Date    Abnormal Pap smear of cervix 1989    Anxiety     Depression     Fever blister     S/P D&C (status post dilation and curettage)      Past Surgical History:   Procedure Laterality Date    BREAST BIOPSY Right 2018    benign    COLONOSCOPY N/A 07/28/2022    Procedure: COLONOSCOPY;  Surgeon: Akua Haider MD;  Location: [facility] ([address] [place] [address]);  Service: Endoscopy;  Laterality: N/A;  original order from Dr Florian on 11/5/21-unusable. new order placed        vaccinated-GT    COLONOSCOPY N/A 11/09/2022    Procedure: COLONOSCOPY;  Surgeon: Gael Rico MD;  [facility]  Service: Endoscopy;  Laterality: N/A;  old case request unusable, new case request placed  fully vaccinated, instructions emailed to [email or web address]  golytely. pre call complete A.s    EXCISION OF PTERYGIUM Right     LASER ABLATION OF THE CERVIX  1989     Social History     Tobacco Use    Smoking status: Never     Passive exposure: Never    Smokeless tobacco: Never   Substance Use Topics    Alcohol use: Yes     Comment: socially    Drug use: Never     Family History   Problem Relation Name Age of Onset     Stroke Mother      Breast cancer Paternal Aunt      Breast cancer Cousin paternal     Colon cancer Neg Hx      Ovarian cancer Neg Hx      Uterine cancer Neg Hx      Cervical cancer Neg Hx      Cancer Neg Hx       OB History    Para Term  AB Living   4 2 1 1 2 2   SAB IAB Ectopic Multiple Live Births   1       2      # Outcome Date GA Lbr Solomon/2nd Weight Sex Type Anes PTL Lv   4 Term 03/25/15    M Vag-Spont   JOE   3 SAB      SAB      2  09/10/11 35w0d   M Vag-Spont   JOE   1 AB                Current Outpatient Medications:     boric acid (BORIC ACID) vaginal suppository, Place 1 each (650 mg total) vaginally nightly as needed (vaginitits)., Disp: 30 suppository, Rfl: 5    EScitalopram oxalate (LEXAPRO) 10 MG tablet, Take 1 tablet (10 mg total) by mouth once daily., Disp: 90 tablet, Rfl: 3    estradioL (VIVELLE-DOT) 0.025 mg/24 hr, Place 1 patch onto the skin twice a week., Disp: 8 patch, Rfl: 11    lactic acid-citric-potassium (PHEXXI) 1.8-1-0.4 % Gel, Place 1 packet vaginally as needed., Disp: 5 g, Rfl: 11    progesterone (PROMETRIUM) 100 MG capsule, Take 1 capsule (100 mg total) by mouth nightly., Disp: 30 capsule, Rfl: 11    spironolactone (ALDACTONE) 50 MG tablet, Take 1 tablet (50 mg total) by mouth once daily., Disp: 30 tablet, Rfl: 11    UNABLE TO FIND, medication name: Testosterone 1% Cream Apply 1 click to the upper inner thigh daily, Disp: 30 g, Rfl: 5    Review of Systems:  General: No fever, chills, or weight loss.  Chest: No chest pain, shortness of breath, or palpitations.  Breast: No pain, masses, or nipple discharge.  Vulva: No pain, lesions, or itching.  Vagina: No relaxation, itching, discharge, or lesions.  Abdomen: No pain, nausea, vomiting, diarrhea, or constipation.  Urinary: No incontinence, nocturia, frequency, or dysuria.  Extremities:  No leg cramps, edema, or calf pain.  Neurologic: No headaches, dizziness, or visual changes.    Objective:   There were no  vitals filed for this visit.  There is no height or weight on file to calculate BMI.      Physical Exam: Deferred       Assessment:    Menopausal symptom  -     Estradiol; Future; Expected date: 09/18/2024  -     Testosterone, free; Future; Expected date: 09/18/2024  -     Testosterone; Future; Expected date: 09/18/2024  -     Progesterone; Future; Expected date: 09/18/2024  -     Follicle Stimulating Hormone; Future; Expected date: 09/18/2024  -     UNABLE TO FIND; medication name: Testosterone 1% Cream Apply 1 click to the upper inner thigh daily  Dispense: 30 g; Refill: 5  -     spironolactone (ALDACTONE) 50 MG tablet; Take 1 tablet (50 mg total) by mouth once daily.  Dispense: 30 tablet; Refill: 11        Plan:   Hormone levels today  Continue Estradiol Vivelle Dot .025mg/d twice weekly  Continue Progesterone 100 mg orally QPM  Add testosterone 1% cream daily to thigh- Faxed to [facility]  Counseled on use  Reviewed side effects and benefits of testosterone  Watch for worsening hair loss.  Increase Spironolactone to 50mg.  Reviewed side effects.  Encouraged low glycemic diet with increased lean protein  Strength workouts 3x per week.  Can consider GLP-1 for weight loss if needed.  Follow up in 3 months.    Instructed patient to call if she experiences any side effects or has any questions.    I spent a total of 30 minutes on the day of the visit.This includes face to face time and non-face to face time preparing to see the patient (eg, review of tests), obtaining and/or reviewing separately obtained history, documenting clinical information in the electronic or other health record, independently interpreting results and communicating results to the patient/family/caregiver, or care coordinator.

## 2024-09-23 LAB — TESTOST FREE SERPL-MCNC: 1 PG/ML

## 2024-11-25 ENCOUNTER — OFFICE VISIT (OUTPATIENT)
Dept: URGENT CARE | Facility: CLINIC | Age: 54
End: 2024-11-25
Payer: COMMERCIAL

## 2024-11-25 VITALS
RESPIRATION RATE: 20 BRPM | DIASTOLIC BLOOD PRESSURE: 87 MMHG | BODY MASS INDEX: 23.49 KG/M2 | OXYGEN SATURATION: 97 % | HEART RATE: 64 BPM | HEIGHT: 68 IN | SYSTOLIC BLOOD PRESSURE: 135 MMHG | TEMPERATURE: 99 F | WEIGHT: 155 LBS

## 2024-11-25 DIAGNOSIS — R52 GENERALIZED BODY ACHES: ICD-10-CM

## 2024-11-25 DIAGNOSIS — J02.9 SORE THROAT: ICD-10-CM

## 2024-11-25 DIAGNOSIS — J01.90 ACUTE BACTERIAL SINUSITIS: Primary | ICD-10-CM

## 2024-11-25 DIAGNOSIS — B96.89 ACUTE BACTERIAL SINUSITIS: Primary | ICD-10-CM

## 2024-11-25 LAB
CTP QC/QA: YES
MOLECULAR STREP A: NEGATIVE
POC MOLECULAR INFLUENZA A AGN: NEGATIVE
POC MOLECULAR INFLUENZA B AGN: NEGATIVE
SARS-COV-2 AG RESP QL IA.RAPID: NEGATIVE

## 2024-11-25 PROCEDURE — 87811 SARS-COV-2 COVID19 W/OPTIC: CPT | Mod: QW,S$GLB,, | Performed by: NURSE PRACTITIONER

## 2024-11-25 PROCEDURE — 87651 STREP A DNA AMP PROBE: CPT | Mod: QW,S$GLB,, | Performed by: NURSE PRACTITIONER

## 2024-11-25 PROCEDURE — 87502 INFLUENZA DNA AMP PROBE: CPT | Mod: QW,S$GLB,, | Performed by: NURSE PRACTITIONER

## 2024-11-25 PROCEDURE — 99214 OFFICE O/P EST MOD 30 MIN: CPT | Mod: S$GLB,,, | Performed by: NURSE PRACTITIONER

## 2024-11-25 RX ORDER — AMOXICILLIN AND CLAVULANATE POTASSIUM 875; 125 MG/1; MG/1
1 TABLET, FILM COATED ORAL 2 TIMES DAILY
Qty: 14 TABLET | Refills: 0 | Status: SHIPPED | OUTPATIENT
Start: 2024-11-25 | End: 2024-12-02

## 2024-11-25 NOTE — PATIENT INSTRUCTIONS
- You must understand that you have received an Urgent Care treatment only and that you may be released before all of your medical problems are known or treated.   - You, the patient, will arrange for follow up care as instructed.   - If your condition worsens or fails to improve we recommend that you receive another evaluation at the ER immediately or contact your PCP to discuss your concerns.   - You can call [phone] or [phone] to help schedule an appointment with the appropriate provider.    Drink plenty of fluids   Get lots of rest  Tylenol or ibuprofen for pain/fever  Mucinex DM for cough  Saline nasal rinses to irrigate sinus cavities  Warm salt water gargles for sore throat  Use magic mouthwash as needed for sore throat. Swish, gargle, and spit. Do not swallow

## 2024-11-25 NOTE — PROGRESS NOTES
"Subjective:      Patient ID: Naima Jeffries is a 54 y.o. female.    Vitals:  height is 5' 7.5" (1.715 m) and weight is 70.3 kg (155 lb). Her oral temperature is 98.6 °F (37 °C). Her blood pressure is 135/87 and her pulse is 64. Her respiration is 20 and oxygen saturation is 97%.     Chief Complaint: Sinus Problem    Pt is a 54 year old female c/o sore throat along w/ mucus production, headaches, & body aches that began 4 days ago and have gradually worsened.  Patient has tried OTC Advil with temporarily relief. Denies known sick contacts.     Sinus Problem  This is a new problem. The current episode started in the past 7 days. The problem has been gradually worsening since onset. There has been no fever. Her pain is at a severity of 8/10. The pain is severe. Associated symptoms include coughing, headaches, a hoarse voice and a sore throat. Pertinent negatives include no chills, congestion, diaphoresis, ear pain, neck pain, shortness of breath, sinus pressure, sneezing or swollen glands. Treatments tried: NSAIDs. The treatment provided mild relief.       Constitution: Negative for chills and sweating.   HENT:  Positive for sore throat. Negative for ear pain, congestion and sinus pressure.    Neck: Negative for neck pain.   Respiratory:  Positive for cough. Negative for shortness of breath.    Allergic/Immunologic: Negative for sneezing.   Neurological:  Positive for headaches.      Objective:     Physical Exam   Constitutional: She is oriented to person, place, and time. She appears well-developed. She is cooperative.  Non-toxic appearance. She does not appear ill. No distress.   HENT:   Head: Normocephalic and atraumatic.   Ears:   Right Ear: Hearing, tympanic membrane, external ear and ear canal normal.   Left Ear: Hearing, tympanic membrane, external ear and ear canal normal.   Nose: Mucosal edema present. No rhinorrhea or nasal deformity. No epistaxis. Right sinus exhibits no maxillary sinus tenderness and no " frontal sinus tenderness. Left sinus exhibits no maxillary sinus tenderness and no frontal sinus tenderness.   Mouth/Throat: Uvula is midline and mucous membranes are normal. No trismus in the jaw. Normal dentition. No uvula swelling. Posterior oropharyngeal erythema present. No oropharyngeal exudate or posterior oropharyngeal edema.   Eyes: Conjunctivae and lids are normal. No scleral icterus.   Neck: Trachea normal and phonation normal. Neck supple. No edema present. No erythema present. No neck rigidity present.   Cardiovascular: Normal rate, regular rhythm, normal heart sounds and normal pulses.   Pulmonary/Chest: Effort normal and breath sounds normal. No respiratory distress. She has no decreased breath sounds. She has no wheezes. She has no rhonchi.   Abdominal: Normal appearance.   Musculoskeletal: Normal range of motion.         General: No deformity. Normal range of motion.   Neurological: She is alert and oriented to person, place, and time. She exhibits normal muscle tone. Coordination normal.   Skin: Skin is warm, dry, intact, not diaphoretic and not pale.   Psychiatric: Her speech is normal and behavior is normal. Judgment and thought content normal.   Nursing note and vitals reviewed.    Results for orders placed or performed in visit on 11/25/24   SARS Coronavirus 2 Antigen, POCT Manual Read    Collection Time: 11/25/24  3:45 PM   Result Value Ref Range    SARS Coronavirus 2 Antigen Negative Negative     Acceptable Yes    POCT Influenza A/B MOLECULAR    Collection Time: 11/25/24  3:45 PM   Result Value Ref Range    POC Molecular Influenza A Ag Negative Negative    POC Molecular Influenza B Ag Negative Negative     Acceptable Yes    POCT Strep A, Molecular    Collection Time: 11/25/24  3:46 PM   Result Value Ref Range    Molecular Strep A, POC Negative Negative     Acceptable Yes      Assessment:     1. Acute bacterial sinusitis    2. Sore throat    3.  Generalized body aches        Plan:       Acute bacterial sinusitis  -     amoxicillin-clavulanate 875-125mg (AUGMENTIN) 875-125 mg per tablet; Take 1 tablet by mouth 2 (two) times daily. for 7 days  Dispense: 14 tablet; Refill: 0    Sore throat  -     SARS Coronavirus 2 Antigen, POCT Manual Read  -     POCT Strep A, Molecular  -     POCT Influenza A/B MOLECULAR  -     (Magic mouthwash) 1:1:1 diphenhydrAMINE(Benadryl) 12.5mg/5ml liq, aluminum & magnesium hydroxide-simethicone (Maalox), LIDOcaine viscous 2%; Swish and spit 10 mLs every 4 (four) hours as needed (sore throat).  Dispense: 180 mL; Refill: 0    Generalized body aches  -     POCT Influenza A/B MOLECULAR      Patient Instructions   - You must understand that you have received an Urgent Care treatment only and that you may be released before all of your medical problems are known or treated.   - You, the patient, will arrange for follow up care as instructed.   - If your condition worsens or fails to improve we recommend that you receive another evaluation at the ER immediately or contact your PCP to discuss your concerns.   - You can call [phone] or [phone] to help schedule an appointment with the appropriate provider.    Drink plenty of fluids   Get lots of rest  Tylenol or ibuprofen for pain/fever  Mucinex DM for cough  Saline nasal rinses to irrigate sinus cavities  Warm salt water gargles for sore throat  Use magic mouthwash as needed for sore throat. Swish, gargle, and spit. Do not swallow

## 2024-12-02 ENCOUNTER — OFFICE VISIT (OUTPATIENT)
Dept: OBSTETRICS AND GYNECOLOGY | Facility: CLINIC | Age: 54
End: 2024-12-02
Attending: OBSTETRICS & GYNECOLOGY
Payer: COMMERCIAL

## 2024-12-02 ENCOUNTER — LAB VISIT (OUTPATIENT)
Dept: LAB | Facility: OTHER | Age: 54
End: 2024-12-02
Attending: OBSTETRICS & GYNECOLOGY
Payer: COMMERCIAL

## 2024-12-02 VITALS
SYSTOLIC BLOOD PRESSURE: 138 MMHG | BODY MASS INDEX: 25.31 KG/M2 | WEIGHT: 167 LBS | HEART RATE: 69 BPM | DIASTOLIC BLOOD PRESSURE: 87 MMHG | HEIGHT: 68 IN

## 2024-12-02 DIAGNOSIS — N95.1 PERIMENOPAUSAL: ICD-10-CM

## 2024-12-02 DIAGNOSIS — N95.1 PERIMENOPAUSAL: Primary | ICD-10-CM

## 2024-12-02 LAB
ESTRADIOL SERPL-MCNC: 160 PG/ML
TESTOST SERPL-MCNC: 38 NG/DL (ref 5–73)

## 2024-12-02 PROCEDURE — 1159F MED LIST DOCD IN RCRD: CPT | Mod: CPTII,S$GLB,, | Performed by: OBSTETRICS & GYNECOLOGY

## 2024-12-02 PROCEDURE — 3079F DIAST BP 80-89 MM HG: CPT | Mod: CPTII,S$GLB,, | Performed by: OBSTETRICS & GYNECOLOGY

## 2024-12-02 PROCEDURE — 82670 ASSAY OF TOTAL ESTRADIOL: CPT | Performed by: OBSTETRICS & GYNECOLOGY

## 2024-12-02 PROCEDURE — 84403 ASSAY OF TOTAL TESTOSTERONE: CPT | Performed by: OBSTETRICS & GYNECOLOGY

## 2024-12-02 PROCEDURE — 99214 OFFICE O/P EST MOD 30 MIN: CPT | Mod: S$GLB,,, | Performed by: OBSTETRICS & GYNECOLOGY

## 2024-12-02 PROCEDURE — 3075F SYST BP GE 130 - 139MM HG: CPT | Mod: CPTII,S$GLB,, | Performed by: OBSTETRICS & GYNECOLOGY

## 2024-12-02 PROCEDURE — 3044F HG A1C LEVEL LT 7.0%: CPT | Mod: CPTII,S$GLB,, | Performed by: OBSTETRICS & GYNECOLOGY

## 2024-12-02 PROCEDURE — 99999 PR PBB SHADOW E&M-EST. PATIENT-LVL III: CPT | Mod: PBBFAC,,, | Performed by: OBSTETRICS & GYNECOLOGY

## 2024-12-02 PROCEDURE — 84402 ASSAY OF FREE TESTOSTERONE: CPT | Performed by: OBSTETRICS & GYNECOLOGY

## 2024-12-02 PROCEDURE — 3008F BODY MASS INDEX DOCD: CPT | Mod: CPTII,S$GLB,, | Performed by: OBSTETRICS & GYNECOLOGY

## 2024-12-05 LAB — TESTOST FREE SERPL-MCNC: 0.5 PG/ML

## 2024-12-06 ENCOUNTER — TELEPHONE (OUTPATIENT)
Dept: OBSTETRICS AND GYNECOLOGY | Facility: CLINIC | Age: 54
End: 2024-12-06
Payer: COMMERCIAL

## 2024-12-06 NOTE — PROGRESS NOTES
SUBJECTIVE:   54 y.o. female   presents today for follow up.  No LMP recorded. Patient is perimenopausal..  She reports that she is using testosterone cream nightly.  She reports that she does feel more focused.  She is using Vivelle-Dot 0.025 mg twice weekly as well as Prometrium 1 help 100 mg nightly.  She reports having some spotting 1-2 weeks ago.  She reports some ongoing hair loss.  She is interested in weight loss..        Past Medical History:   Diagnosis Date    Abnormal Pap smear of cervix     Anxiety     Depression     Fever blister     S/P D&C (status post dilation and curettage)      Past Surgical History:   Procedure Laterality Date    BREAST BIOPSY Right 2018    benign    COLONOSCOPY N/A 2022    Procedure: COLONOSCOPY;  Surgeon: Akua Haider MD;  Location: [facility] ([address] [place] [address]);  Service: Endoscopy;  Laterality: N/A;  original order from Dr Florian on 21-unusable. new order placed        vaccinated-GT    COLONOSCOPY N/A 2022    Procedure: COLONOSCOPY;  Surgeon: Gael Rico MD;  Location: [facility] ([address] [place] [address]);  Service: Endoscopy;  Laterality: N/A;  old case request unusable, new case request placed  fully vaccinated, instructions emailed to [email or web address]  golytely. pre call complete A.s    EXCISION OF PTERYGIUM Right     LASER ABLATION OF THE CERVIX       Social History     Socioeconomic History    Marital status: Single   Tobacco Use    Smoking status: Never     Passive exposure: Never    Smokeless tobacco: Never   Substance and Sexual Activity    Alcohol use: Yes     Comment: socially    Drug use: Never    Sexual activity: Yes     Partners: Male     Birth control/protection: None     Social Drivers of Health     Financial Resource Strain: Low Risk  (2024)    Overall Financial Resource Strain (CARDIA)     Difficulty of Paying Living Expenses: Not hard at all   Food Insecurity: No Food Insecurity (2024)    Hunger Vital Sign     Worried  About Running Out of Food in the Last Year: Never true     Ran Out of Food in the Last Year: Never true   Transportation Needs: No Transportation Needs (2024)    PRAPARE - Transportation     Lack of Transportation (Medical): No     Lack of Transportation (Non-Medical): No   Physical Activity: Insufficiently Active (2024)    Exercise Vital Sign     Days of Exercise per Week: 3 days     Minutes of Exercise per Session: 40 min   Stress: Stress Concern Present (2024)    [employer] [place] [employer] - Occupational Stress Questionnaire     Feeling of Stress : To some extent   Housing Stability: Unknown (2024)    Housing Stability Vital Sign     Unable to Pay for Housing in the Last Year: No     Family History   Problem Relation Name Age of Onset    Stroke Mother      Breast cancer Paternal Aunt      Breast cancer Cousin paternal     Colon cancer Neg Hx      Ovarian cancer Neg Hx      Uterine cancer Neg Hx      Cervical cancer Neg Hx      Cancer Neg Hx       OB History    Para Term  AB Living   4 2 1 1 2 2   SAB IAB Ectopic Multiple Live Births   1       2      # Outcome Date GA Lbr Solomon/2nd Weight Sex Type Anes PTL Lv   4 Term 03/25/15    M Vag-Spont   JOE   3 SAB      SAB      2  09/10/11 35w0d   M Vag-Spont   JOE   1 AB 2010                   Current Outpatient Medications   Medication Sig Dispense Refill    boric acid (BORIC ACID) vaginal suppository Place 1 each (650 mg total) vaginally nightly as needed (vaginitits). 30 suppository 5    EScitalopram oxalate (LEXAPRO) 10 MG tablet Take 1 tablet (10 mg total) by mouth once daily. 90 tablet 3    estradioL (VIVELLE-DOT) 0.025 mg/24 hr Place 1 patch onto the skin twice a week. 8 patch 11    lactic acid-citric-potassium (PHEXXI) 1.8-1-0.4 % Gel Place 1 packet vaginally as needed. 5 g 11    progesterone (PROMETRIUM) 100 MG capsule Take 1 capsule (100 mg total) by mouth nightly. 30 capsule 11    UNABLE TO FIND  medication name: Testosterone 1% Cream Apply 1 click to the upper inner thigh daily 30 g 5    (Magic mouthwash) 1:1:1 diphenhydrAMINE(Benadryl) 12.5mg/5ml liq, aluminum & magnesium hydroxide-simethicone (Maalox), LIDOcaine viscous 2% Swish and spit 10 mLs every 4 (four) hours as needed (sore throat). 180 mL 0    spironolactone (ALDACTONE) 50 MG tablet Take 1 tablet (50 mg total) by mouth once daily. 30 tablet 11     No current facility-administered medications for this visit.     Allergies: Patient has no known allergies.     The 10-year ASCVD risk score (Farnaz TREVINO, et al., 2019) is: 1.3%    Values used to calculate the score:      Age: 54 years      Sex: Female      Is Non- : No      Diabetic: No      Tobacco smoker: No      Systolic Blood Pressure: 138 mmHg      Is BP treated: No      HDL Cholesterol: 98 mg/dL      Total Cholesterol: 220 mg/dL      ROS:  Constitutional: no weight loss, +weight gain, fever, fatigue  Eyes:  No vision changes, glasses/contacts  ENT/Mouth: No ulcers, sinus problems, ears ringing, headache  Cardiovascular: No inability to lie flat, chest pain, exercise intolerance, swelling, heart palpitations  Respiratory: No wheezing, coughing blood, shortness of breath, or cough  Gastrointestinal: No diarrhea, bloody stool, nausea/vomiting, constipation, gas, hemorrhoids  Genitourinary: No blood in urine, painful urination, urgency of urination, frequency of urination, incomplete emptying, incontinence, abnormal bleeding, painful periods, heavy periods, vaginal discharge, vaginal odor, painful intercourse, sexual problems, bleeding after intercourse.  Musculoskeletal: No muscle weakness  Skin/Breast: No painful breasts, nipple discharge, masses, rash, ulcers  Neurological: No passing out, seizures, numbness, headache  Endocrine: No diabetes, hypothyroid, hyperthyroid, hot flashes, +hair loss, abnormal hair growth, acne  Psychiatric: No depression, crying  Hematologic: No  bruises, bleeding, swollen lymph nodes, anemia.    Labs 9/18/2024  FSH 46.54  Progesterone 2.4  Total testosterone 41  Free testosterone   1.0 estradiol <10  Physical Exam  deferred    ASSESSMENT:   1. Perimenopausal  Estradiol    Testosterone, free    Testosterone            PLAN:   Recommend labs   Referral to [facility] for weight loss  WWE to be scheduled

## 2024-12-10 ENCOUNTER — TELEPHONE (OUTPATIENT)
Dept: OBSTETRICS AND GYNECOLOGY | Facility: CLINIC | Age: 54
End: 2024-12-10
Payer: COMMERCIAL

## 2024-12-10 NOTE — TELEPHONE ENCOUNTER
----- Message from Kathi sent at 12/10/2024  3:57 PM CST -----  PT returning call to schedule annual appt

## 2025-01-02 ENCOUNTER — LAB VISIT (OUTPATIENT)
Dept: LAB | Facility: OTHER | Age: 55
End: 2025-01-02
Attending: OBSTETRICS & GYNECOLOGY
Payer: COMMERCIAL

## 2025-01-02 ENCOUNTER — OFFICE VISIT (OUTPATIENT)
Dept: OBSTETRICS AND GYNECOLOGY | Facility: CLINIC | Age: 55
End: 2025-01-02
Payer: COMMERCIAL

## 2025-01-02 VITALS
DIASTOLIC BLOOD PRESSURE: 97 MMHG | HEART RATE: 77 BPM | SYSTOLIC BLOOD PRESSURE: 149 MMHG | BODY MASS INDEX: 25.27 KG/M2 | WEIGHT: 161 LBS | HEIGHT: 67 IN

## 2025-01-02 DIAGNOSIS — Z11.3 ROUTINE SCREENING FOR STI (SEXUALLY TRANSMITTED INFECTION): ICD-10-CM

## 2025-01-02 DIAGNOSIS — Z12.4 SCREENING FOR MALIGNANT NEOPLASM OF THE CERVIX: Primary | ICD-10-CM

## 2025-01-02 DIAGNOSIS — Z12.31 ENCOUNTER FOR SCREENING MAMMOGRAM FOR MALIGNANT NEOPLASM OF BREAST: ICD-10-CM

## 2025-01-02 DIAGNOSIS — N95.1 MENOPAUSAL SYMPTOM: ICD-10-CM

## 2025-01-02 LAB
HBV SURFACE AG SERPL QL IA: NORMAL
HIV 1+2 AB+HIV1 P24 AG SERPL QL IA: NEGATIVE

## 2025-01-02 PROCEDURE — 87389 HIV-1 AG W/HIV-1&-2 AB AG IA: CPT | Performed by: OBSTETRICS & GYNECOLOGY

## 2025-01-02 PROCEDURE — 86780 TREPONEMA PALLIDUM: CPT | Performed by: OBSTETRICS & GYNECOLOGY

## 2025-01-02 PROCEDURE — 87591 N.GONORRHOEAE DNA AMP PROB: CPT | Performed by: OBSTETRICS & GYNECOLOGY

## 2025-01-02 PROCEDURE — 36415 COLL VENOUS BLD VENIPUNCTURE: CPT | Performed by: OBSTETRICS & GYNECOLOGY

## 2025-01-02 PROCEDURE — 87340 HEPATITIS B SURFACE AG IA: CPT | Performed by: OBSTETRICS & GYNECOLOGY

## 2025-01-02 PROCEDURE — 99999 PR PBB SHADOW E&M-EST. PATIENT-LVL III: CPT | Mod: PBBFAC,,, | Performed by: OBSTETRICS & GYNECOLOGY

## 2025-01-02 RX ORDER — ESTRADIOL 0.03 MG/D
1 FILM, EXTENDED RELEASE TRANSDERMAL
Qty: 8 PATCH | Refills: 11 | Status: SHIPPED | OUTPATIENT
Start: 2025-01-02

## 2025-01-02 RX ORDER — PROGESTERONE 100 MG/1
100 CAPSULE ORAL NIGHTLY
Qty: 30 CAPSULE | Refills: 11 | Status: SHIPPED | OUTPATIENT
Start: 2025-01-02 | End: 2026-01-02

## 2025-01-02 NOTE — PROGRESS NOTES
CC: Well woman exam    Naima Jeffries is a 54 y.o. female  presents for well woman exam.  LMP: postmenopausal female who presents for WWE.  Currently on HRT and testosterone cream.  She started 4-5 months ago.  She has had a few episodes of spotting with the onset of HRT. Patient states that she still has decreased energy.  She does have poor sleep quality.  She is interested in STI testing as she has a new partner.    OB History          4    Para   2    Term   1       1    AB   2    Living   2         SAB   1    IAB        Ectopic        Multiple        Live Births   2               Gynecology   Past Medical History:   Diagnosis Date    Abnormal Pap smear of cervix     Anxiety     Depression     Fever blister     S/P D&C (status post dilation and curettage)      Past Surgical History:   Procedure Laterality Date    BREAST BIOPSY Right 2018    benign    COLONOSCOPY N/A 2022    Procedure: COLONOSCOPY;  Surgeon: Akua Haider MD;  Location: [facility] ([address] [place] [address]);  Service: Endoscopy;  Laterality: N/A;  original order from Dr Florian on 21-unusable. new order placed        vaccinated-GT    COLONOSCOPY N/A 2022    Procedure: COLONOSCOPY;  Surgeon: Gael Rico MD;  Location: [facility] ([address] [place] [address]);  Service: Endoscopy;  Laterality: N/A;  old case request unusable, new case request placed  fully vaccinated, instructions emailed to [email or web address]  golytely. pre call complete A.s    EXCISION OF PTERYGIUM Right     LASER ABLATION OF THE CERVIX       Social History     Socioeconomic History    Marital status: Single   Tobacco Use    Smoking status: Never     Passive exposure: Never    Smokeless tobacco: Never   Substance and Sexual Activity    Alcohol use: Yes     Comment: socially    Drug use: Never    Sexual activity: Yes     Partners: Male     Birth control/protection: None     Social Drivers of Health     Financial Resource Strain: Low Risk  (2024)     "Overall Financial Resource Strain (CARDIA)     Difficulty of Paying Living Expenses: Not hard at all   Food Insecurity: No Food Insecurity (4/13/2024)    Hunger Vital Sign     Worried About Running Out of Food in the Last Year: Never true     Ran Out of Food in the Last Year: Never true   Transportation Needs: No Transportation Needs (4/13/2024)    PRAPARE - Transportation     Lack of Transportation (Medical): No     Lack of Transportation (Non-Medical): No   Physical Activity: Insufficiently Active (4/13/2024)    Exercise Vital Sign     Days of Exercise per Week: 3 days     Minutes of Exercise per Session: 40 min   Stress: Stress Concern Present (4/13/2024)    [employer] - Occupational Stress Questionnaire     Feeling of Stress : To some extent   Housing Stability: Unknown (4/13/2024)    Housing Stability Vital Sign     Unable to Pay for Housing in the Last Year: No     Family History   Problem Relation Name Age of Onset    Stroke Mother      Breast cancer Paternal Aunt      Breast cancer Cousin paternal     Colon cancer Neg Hx      Ovarian cancer Neg Hx      Uterine cancer Neg Hx      Cervical cancer Neg Hx      Cancer Neg Hx           BP (!) 149/97   Pulse 77   Ht 5' 7" (1.702 m)   Wt 73 kg (161 lb)   BMI 25.22 kg/m²       ROS  Review of Systems   Constitutional:         Fatigue   Gastrointestinal: Negative.    Genitourinary: Negative.    Psychiatric/Behavioral: Negative.            PHYSICAL EXAM:  Physical Exam  Constitutional:       Appearance: Normal appearance. She is normal weight.   Chest:   Breasts:     Breasts are symmetrical.      Right: Normal. No mass, nipple discharge, skin change or tenderness.      Left: Normal. No mass, nipple discharge, skin change or tenderness.   Abdominal:      General: Abdomen is flat.      Palpations: Abdomen is soft.      Tenderness: There is no abdominal tenderness. There is no guarding or rebound.   Genitourinary:     General: Normal " vulva.      Exam position: Lithotomy position.      Labia:         Right: No rash, tenderness or lesion.         Left: No rash, tenderness or lesion.       Vagina: Normal. No vaginal discharge, tenderness, bleeding or lesions.      Cervix: Normal. No discharge, lesion or cervical bleeding.      Uterus: Normal.       Adnexa: Right adnexa normal and left adnexa normal.        Right: No mass, tenderness or fullness.          Left: No mass, tenderness or fullness.        Comments: Vulvar and vaginal atrophy  Lymphadenopathy:      Upper Body:      Right upper body: No axillary adenopathy.      Left upper body: No axillary adenopathy.   Neurological:      Mental Status: She is alert and oriented to person, place, and time.   Psychiatric:         Behavior: Behavior normal.            Screening for malignant neoplasm of the cervix  -     Liquid-Based Pap Smear, Screening  -     HPV High Risk Genotypes, PCR    Encounter for screening mammogram for malignant neoplasm of breast    Menopausal symptom  -     estradioL (VIVELLE-DOT) 0.025 mg/24 hr; Place 1 patch onto the skin twice a week.  Dispense: 8 patch; Refill: 11    Routine screening for STI (sexually transmitted infection)  -     C. trachomatis/N. gonorrhoeae by AMP DNA [facility]; Cervicovaginal  -     HIV 1/2 Ag/Ab (4th Gen); Future; Expected date: 01/02/2025  -     FTA antibodies, IgG and IgM; Future; Expected date: 01/02/2025  -     Hepatitis B Surface Antigen; Future; Expected date: 01/02/2025    Other orders  -     progesterone (PROMETRIUM) 100 MG capsule; Take 1 capsule (100 mg total) by mouth nightly.  Dispense: 30 capsule; Refill: 11      Patient doing well  All questions answered  RTO 1 year or prn      Patient was counseled today on A.C.S. Pap guidelines and recommendations for yearly pelvic exams, mammograms and monthly self breast exams; to see her PCP for other health maintenance.     No follow-ups on file.

## 2025-01-03 LAB
C TRACH DNA SPEC QL NAA+PROBE: NOT DETECTED
N GONORRHOEA DNA SPEC QL NAA+PROBE: NOT DETECTED

## 2025-01-06 ENCOUNTER — PATIENT MESSAGE (OUTPATIENT)
Dept: INTERNAL MEDICINE | Facility: CLINIC | Age: 55
End: 2025-01-06
Payer: COMMERCIAL

## 2025-01-06 ENCOUNTER — TELEPHONE (OUTPATIENT)
Facility: CLINIC | Age: 55
End: 2025-01-06
Payer: COMMERCIAL

## 2025-01-06 DIAGNOSIS — Z00.00 WELLNESS EXAMINATION: Primary | ICD-10-CM

## 2025-01-06 DIAGNOSIS — Z12.31 SCREENING MAMMOGRAM, ENCOUNTER FOR: Primary | ICD-10-CM

## 2025-01-06 LAB — ARUP MISCELLANEOUS TEST 1: NORMAL

## 2025-01-06 NOTE — TELEPHONE ENCOUNTER
----- Message from Nerissa sent at 1/6/2025  2:33 PM CST -----  Contact: Self/ [phone]  Caller is requesting to schedule their annual screening mammogram appointment. Order is not listed in Epic.  Please enter order and contact patient to schedule.    Would the patient like a call back, or a response through their [facility] portal?:   call back     Where would they like the mammogram performed?:[facility]     Comments:

## 2025-01-06 NOTE — TELEPHONE ENCOUNTER
----- Message from Nerissa sent at 1/6/2025  2:33 PM CST -----  Contact: Self/ [phone]  Caller is requesting to schedule their annual screening mammogram appointment. Order is not listed in Epic.  Please enter order and contact patient to schedule.    Would the patient like a call back, or a response through their [facility] portal?:   call back     Where would they like the mammogram performed?:[facility]     Comments:

## 2025-01-06 NOTE — TELEPHONE ENCOUNTER
Called patient no answer to inform that her mammogram has been ordered and scheduled on 05/01/25 at 9:30 am.

## 2025-01-13 ENCOUNTER — OFFICE VISIT (OUTPATIENT)
Dept: OBSTETRICS AND GYNECOLOGY | Facility: CLINIC | Age: 55
End: 2025-01-13
Payer: COMMERCIAL

## 2025-01-13 VITALS — WEIGHT: 160 LBS | BODY MASS INDEX: 25.11 KG/M2 | HEIGHT: 67 IN

## 2025-01-13 DIAGNOSIS — N95.1 MENOPAUSAL SYMPTOM: Primary | ICD-10-CM

## 2025-01-13 DIAGNOSIS — E66.3 OVERWEIGHT: ICD-10-CM

## 2025-01-13 NOTE — PROGRESS NOTES
The patient location is: home  The chief complaint leading to consultation is: weight loss    Visit type: audiovisual    Face to Face time with patient: 25 minutes  30 minutes of total time spent on the encounter, which includes face to face time and non-face to face time preparing to see the patient (eg, review of tests), Obtaining and/or reviewing separately obtained history, Documenting clinical information in the electronic or other health record, Independently interpreting results (not separately reported) and communicating results to the patient/family/caregiver, or Care coordination (not separately reported).         Each patient to whom he or she provides medical services by telemedicine is:  (1) informed of the relationship between the physician and patient and the respective role of any other health care provider with respect to management of the patient; and (2) notified that he or she may decline to receive medical services by telemedicine and may withdraw from such care at any time.    Notes:    Subjective:      Naima Jeffries is a 54 y.o. female who presents to discuss weight loss. She reports weight gain with perimenopause. High stress in 2017 and lost weight with this. She went from 150lb to about 135lb. Moved to [place] from [place] and gained some weight with this transitione. She is about 160lb and feels much better at lower weight. Decreased energy to exercise like she was before. Increased joint pain that limits walking in the morning. She has never taken medication for weight loss before. Has done well with modified paleo diet and sticks with mostly.   She is perimenopausal on HRT.    Routine labs: 3/11/2024     Sleep: getting about 7.5 hours per night     Stress: Medium to high    Exercise: limited to due fatigue and joint pain; doing yoga and pilates 3x per week     A typical day consists of:  Breakfast: coffee with cream; yogurt with blueberries  Lunch: leftovers or salad with protein  Dinner:  jay food with corona and rice; taco night- varies with kids  Snack: avoids snacking in the evenings after 7PM  Beverages: tea, water, sparkling water. Alcohol- 1-2 glasses of wine with dinner on 2-3 days per week and a few drinks on the weekend.      Lab Visit on 01/02/2025   Component Date Value Ref Range Status    HIV 1/2 Ag/Ab 01/02/2025 Negative  Negative Final    Hepatitis B Surface Ag 01/02/2025 Non-reactive  Non-reactive Final    [facility] Miscellaneous Test 1 01/02/2025 SEE NOTE   Final   Office Visit on 01/02/2025   Component Date Value Ref Range Status    Final Pathologic Diagnosis 01/02/2025    Final                    Value:Specimen Adequacy  Satisfactory for interpretation. Endocervical component is absent.    [place] Category  Negative for intraepithelial lesion or malignancy.  Inflammation present.  Shift in leo suggestive of bacterial vaginosis.      Disclaimer 01/02/2025    Final                    Value:The Pap smear is a screening test that aids in the detection of cervical cancer and cancer precursors. Both false positive and false negative results can occur. The test should be used at regular intervals, and positive results should be confirmed before   definitive therapy.  This liquid based specimen is processed using the , , or  [employer] Thin PrepPAP System. This specimen has been analyzed by the ThinPrep Imaging System ([employer]), an automated imaging and review system which assists the laboratory in   evaluating cells on ThinPrep PAP tests. Following automated imaging, selected fields from every slide are reviewed by a cytotechnologist and/or pathologist.     Screening was performed at [facility], [address], [place] [address].      HPV other High Risk types, PCR 01/02/2025 Negative  Negative Final    HPV High Risk type 16, PCR 01/02/2025 Negative  Negative Final    HPV High Risk type 18, PCR 01/02/2025 Negative   Negative Final    Chlamydia, Amplified DNA 01/02/2025 Not Detected  Not Detected Final    N gonorrhoeae, amplified DNA 01/02/2025 Not Detected  Not Detected Final   Lab Visit on 12/02/2024   Component Date Value Ref Range Status    Estradiol 12/02/2024 160  See Text pg/mL Final    Testosterone, Free 12/02/2024 0.5  pg/mL Final    Testosterone, Total 12/02/2024 38  5 - 73 ng/dL Final   Office Visit on 11/25/2024   Component Date Value Ref Range Status    SARS Coronavirus 2 Antigen 11/25/2024 Negative  Negative Final     Acceptable 11/25/2024 Yes   Final    Molecular Strep A, POC 11/25/2024 Negative  Negative Final     Acceptable 11/25/2024 Yes   Final    POC Molecular Influenza A Ag 11/25/2024 Negative  Negative Final    POC Molecular Influenza B Ag 11/25/2024 Negative  Negative Final     Acceptable 11/25/2024 Yes   Final       Past Medical History:   Diagnosis Date    Abnormal Pap smear of cervix 1989    Anxiety     Depression     Fever blister     S/P D&C (status post dilation and curettage)      Past Surgical History:   Procedure Laterality Date    BREAST BIOPSY Right 2018    benign    COLONOSCOPY N/A 07/28/2022    Procedure: COLONOSCOPY;  Surgeon: Akua Haider MD;  Location: [facility] ([address] [place] [address]);  Service: Endoscopy;  Laterality: N/A;  original order from Dr Florian on 11/5/21-unusable. new order placed        vaccinated-GT    COLONOSCOPY N/A 11/09/2022    Procedure: COLONOSCOPY;  Surgeon: Gael Rico MD;  Location: [facility] ([address] [place] [address]);  Service: Endoscopy;  Laterality: N/A;  old case request unusable, new case request placed  fully vaccinated, instructions emailed to [email or web address]  golytely. pre call complete A.s    EXCISION OF PTERYGIUM Right     LASER ABLATION OF THE CERVIX  1989     Social History     Tobacco Use    Smoking status: Never     Passive exposure: Never    Smokeless tobacco: Never   Substance Use Topics    Alcohol use: Yes      Comment: socially    Drug use: Never     Family History   Problem Relation Name Age of Onset    Stroke Mother      Breast cancer Paternal Aunt      Breast cancer Cousin paternal     Colon cancer Neg Hx      Ovarian cancer Neg Hx      Uterine cancer Neg Hx      Cervical cancer Neg Hx      Cancer Neg Hx       OB History    Para Term  AB Living   4 2 1 1 2 2   SAB IAB Ectopic Multiple Live Births   1       2      # Outcome Date GA Lbr Solomon/2nd Weight Sex Type Anes PTL Lv   4 Term 03/25/15    M Vag-Spont   JOE   3 SAB      SAB      2  09/10/11 [id number]   M Vag-Spont   JOE   1 AB                Current Outpatient Medications:     (Magic mouthwash) 1:1:1 diphenhydrAMINE(Benadryl) 12.5mg/5ml liq, aluminum & magnesium hydroxide-simethicone (Maalox), LIDOcaine viscous 2%, Swish and spit 10 mLs every 4 (four) hours as needed (sore throat)., Disp: 180 mL, Rfl: 0    boric acid (BORIC ACID) vaginal suppository, Place 1 each (650 mg total) vaginally nightly as needed (vaginitits)., Disp: 30 suppository, Rfl: 5    EScitalopram oxalate (LEXAPRO) 10 MG tablet, Take 1 tablet (10 mg total) by mouth once daily., Disp: 90 tablet, Rfl: 3    estradioL (VIVELLE-DOT) 0.025 mg/24 hr, Place 1 patch onto the skin twice a week., Disp: 8 patch, Rfl: 11    progesterone (PROMETRIUM) 100 MG capsule, Take 1 capsule (100 mg total) by mouth nightly., Disp: 30 capsule, Rfl: 11    semaglutide, weight loss, 0.25 mg/0.5 mL PnIj, Inject 0.25 mg into the skin every 7 days., Disp: , Rfl:     UNABLE TO FIND, medication name: Testosterone 1% Cream Apply 1 click to the upper inner thigh daily, Disp: 30 g, Rfl: 5    Review of Systems:  General: No fever, chills, or weight loss.  Chest: No chest pain, shortness of breath, or palpitations.  Breast: No pain, masses, or nipple discharge.  Vulva: No pain, lesions, or itching.  Vagina: No relaxation, itching, discharge, or lesions.  Abdomen: No pain, nausea, vomiting, diarrhea, or  "constipation.  Urinary: No incontinence, nocturia, frequency, or dysuria.  Extremities:  No leg cramps, edema, or calf pain.  Neurologic: No headaches, dizziness, or visual changes.    Objective:     Vitals:    01/13/25 0850   Weight: 72.6 kg (160 lb)   Height: 5' 7" (1.702 m)     Body mass index is 25.06 kg/m².    PHYSICAL EXAM:  APPEARANCE: Well nourished, well developed, in no acute distress.  AFFECT: WNL, alert and oriented x 3  SKIN: No acne or hirsutism  CHEST: Good respiratory effect    Assessment:    Menopausal symptom    Overweight  -     semaglutide, weight loss, 0.25 mg/0.5 mL PnIj; Inject 0.25 mg into the skin every 7 days.      BMR calculated at 1406 calories per day.  Plan:   Goal is to shift body composition to increase lean muscle to improve metabolism and protect from osteoporosis.   Encouraged lean protein with every meal.  Goal of  90g of protein per day  Add collagen supplement in AM  Wide variety of fruits and vegetables.   Log in juanjo with goal of 9720-6791 calories a day  We reviewed medication options and she will not qualify for weight loss medications through her insurance.   Reviewed the differences between Wegovy/Ozempic and compounded Semaglutide-L-carnitine and that these medications are not FDA regulated.  Start compounded semaglutide-L-carnitine 0.25mg SC weekly. Counseled on use- Faxed to [facility]  Reviewed side effects and risks of medications. No family or personal history of thyroid cancer. Denies personal history of pancreatitis or underlying problems with gallbladder.  Discussed that these are long term options for weight loss and risk of gaining weight back if discontinued.    Follow up in 8 weeks.    Instructed patient to call if she experiences any side effects or has any questions.    I spent a total of 30 minutes on the day of the visit.This includes face to face time and non-face to face time preparing to see the patient (eg, review of tests), obtaining " and/or reviewing separately obtained history, documenting clinical information in the electronic or other health record, independently interpreting results and communicating results to the patient/family/caregiver, or care coordinator.

## 2025-03-10 ENCOUNTER — OFFICE VISIT (OUTPATIENT)
Dept: INTERNAL MEDICINE | Facility: CLINIC | Age: 55
End: 2025-03-10
Payer: COMMERCIAL

## 2025-03-10 VITALS
WEIGHT: 162.5 LBS | HEART RATE: 81 BPM | BODY MASS INDEX: 25.51 KG/M2 | OXYGEN SATURATION: 98 % | SYSTOLIC BLOOD PRESSURE: 126 MMHG | HEIGHT: 67 IN | DIASTOLIC BLOOD PRESSURE: 68 MMHG

## 2025-03-10 DIAGNOSIS — L65.9 HAIR LOSS: ICD-10-CM

## 2025-03-10 DIAGNOSIS — M25.551 RIGHT HIP PAIN: ICD-10-CM

## 2025-03-10 DIAGNOSIS — F98.8 ATTENTION DEFICIT DISORDER, UNSPECIFIED TYPE: ICD-10-CM

## 2025-03-10 DIAGNOSIS — Z00.00 WELLNESS EXAMINATION: Primary | ICD-10-CM

## 2025-03-10 DIAGNOSIS — Z23 NEED FOR PNEUMOCOCCAL VACCINATION: ICD-10-CM

## 2025-03-10 PROCEDURE — 3074F SYST BP LT 130 MM HG: CPT | Mod: CPTII,S$GLB,, | Performed by: INTERNAL MEDICINE

## 2025-03-10 PROCEDURE — 90677 PCV20 VACCINE IM: CPT | Mod: S$GLB,,, | Performed by: INTERNAL MEDICINE

## 2025-03-10 PROCEDURE — 1159F MED LIST DOCD IN RCRD: CPT | Mod: CPTII,S$GLB,, | Performed by: INTERNAL MEDICINE

## 2025-03-10 PROCEDURE — 3078F DIAST BP <80 MM HG: CPT | Mod: CPTII,S$GLB,, | Performed by: INTERNAL MEDICINE

## 2025-03-10 PROCEDURE — 90471 IMMUNIZATION ADMIN: CPT | Mod: S$GLB,,, | Performed by: INTERNAL MEDICINE

## 2025-03-10 PROCEDURE — 99999 PR PBB SHADOW E&M-EST. PATIENT-LVL V: CPT | Mod: PBBFAC,,, | Performed by: INTERNAL MEDICINE

## 2025-03-10 PROCEDURE — 99396 PREV VISIT EST AGE 40-64: CPT | Mod: 25,S$GLB,, | Performed by: INTERNAL MEDICINE

## 2025-03-10 PROCEDURE — 3008F BODY MASS INDEX DOCD: CPT | Mod: CPTII,S$GLB,, | Performed by: INTERNAL MEDICINE

## 2025-03-10 RX ORDER — MINOXIDIL 2.5 MG/1
TABLET ORAL
Qty: 30 TABLET | Refills: 3 | Status: SHIPPED | OUTPATIENT
Start: 2025-03-10

## 2025-03-10 RX ORDER — PREDNISONE 20 MG/1
20 TABLET ORAL DAILY
Qty: 5 TABLET | Refills: 0 | Status: SHIPPED | OUTPATIENT
Start: 2025-03-10

## 2025-03-10 NOTE — PROGRESS NOTES
Subjective:       Patient ID: Naima Jeffries is a 54 y.o. female.    Chief Complaint: Annual Exam    HPIBrandy is feeling better with the hormones. Still feels she may have ADD.  Her hair is not growing.  R hip pain is worsening. No CP or SOB. Some paresthesia of R arm intermittently feels like it is stemming from her neck.  Review of Systems   Respiratory:  Negative for shortness of breath (PND or orthopnea).    Cardiovascular:  Negative for chest pain (arm pain or jaw pain).   Gastrointestinal:  Negative for abdominal pain, diarrhea, nausea and vomiting.   Genitourinary:  Negative for dysuria.   Neurological:  Negative for seizures, syncope and headaches.       Objective:      Physical Exam  Constitutional:       General: She is not in acute distress.     Appearance: She is well-developed.   HENT:      Head: Normocephalic.   Eyes:      Pupils: Pupils are equal, round, and reactive to light.   Neck:      Thyroid: No thyromegaly.      Vascular: No JVD.   Cardiovascular:      Rate and Rhythm: Normal rate and regular rhythm.      Heart sounds: Normal heart sounds. No murmur heard.     No friction rub. No gallop.   Pulmonary:      Effort: Pulmonary effort is normal.      Breath sounds: Normal breath sounds. No wheezing or rales.   Abdominal:      General: Bowel sounds are normal. There is no distension.      Palpations: Abdomen is soft. There is no mass.      Tenderness: There is no abdominal tenderness. There is no guarding or rebound.   Musculoskeletal:      Cervical back: Neck supple.   Lymphadenopathy:      Cervical: No cervical adenopathy.   Skin:     General: Skin is warm and dry.   Neurological:      Mental Status: She is alert and oriented to person, place, and time.      Deep Tendon Reflexes: Reflexes are normal and symmetric.   Psychiatric:         Behavior: Behavior normal.         Thought Content: Thought content normal.         Judgment: Judgment normal.         Assessment:       1. Wellness examination    2.  Right hip pain    3. Hair loss    4. Attention deficit disorder, unspecified type    5. Need for pneumococcal vaccination        Plan:   Wellness examination  -     CBC Auto Differential; Future; Expected date: 03/10/2025  -     Comprehensive Metabolic Panel; Future; Expected date: 03/10/2025  -     Lipid Panel; Future; Expected date: 03/10/2025  -     TSH; Future; Expected date: 03/10/2025  -     Hemoglobin A1C; Future; Expected date: 03/10/2025  -     Vitamin D; Future; Expected date: 03/10/2025  -     Vitamin B12; Future; Expected date: 04/10/2025  -     Methylmalonic Acid, Serum; Future; Expected date: 03/10/2025  -     Urinalysis; Future; Expected date: 03/10/2025  -     Urinalysis Microscopic; Future; Expected date: 03/11/2025    Right hip pain  -     Ambulatory referral/consult to Orthopedics; Future; Expected date: 03/17/2025  -     X-Ray Hip 2 or 3 views Right with Pelvis when performed; Future; Expected date: 03/10/2025    Hair loss  -     Ambulatory referral/consult to Dermatology; Future; Expected date: 03/17/2025    Attention deficit disorder, unspecified type  -     Ambulatory referral/consult to Psychiatry; Future; Expected date: 03/17/2025    Need for pneumococcal vaccination  -     pneumoc 20-karishma conj-dip cr(PF) (PREVNAR-20 (PF)) injection Syrg 0.5 mL    Other orders  -     minoxidiL (LONITEN) 2.5 MG tablet; Half - one tablet daily  Dispense: 30 tablet; Refill: 3- for her hair  -     predniSONE (DELTASONE) 20 MG tablet; Take 1 tablet (20 mg total) by mouth once daily.  Dispense: 5 tablet; Refill: 0- for paresthesia and hip for now

## 2025-03-13 ENCOUNTER — HOSPITAL ENCOUNTER (OUTPATIENT)
Dept: RADIOLOGY | Facility: HOSPITAL | Age: 55
Discharge: HOME | End: 2025-03-13
Attending: INTERNAL MEDICINE
Payer: COMMERCIAL

## 2025-03-13 DIAGNOSIS — M25.551 RIGHT HIP PAIN: ICD-10-CM

## 2025-03-13 PROCEDURE — 73502 X-RAY EXAM HIP UNI 2-3 VIEWS: CPT | Mod: TC,PN,RT

## 2025-03-13 PROCEDURE — 73502 X-RAY EXAM HIP UNI 2-3 VIEWS: CPT | Mod: 26,RT,, | Performed by: RADIOLOGY

## 2025-03-14 ENCOUNTER — LAB VISIT (OUTPATIENT)
Dept: LAB | Facility: HOSPITAL | Age: 55
End: 2025-03-14
Attending: INTERNAL MEDICINE
Payer: COMMERCIAL

## 2025-03-14 DIAGNOSIS — Z00.00 WELLNESS EXAMINATION: ICD-10-CM

## 2025-03-14 LAB
25(OH)D3+25(OH)D2 SERPL-MCNC: 42 NG/ML (ref 30–96)
ALBUMIN SERPL BCP-MCNC: 4.2 G/DL (ref 3.5–5.2)
ALP SERPL-CCNC: 42 U/L (ref 40–150)
ALT SERPL W/O P-5'-P-CCNC: 16 U/L (ref 10–44)
ANION GAP SERPL CALC-SCNC: 10 MMOL/L (ref 8–16)
AST SERPL-CCNC: 20 U/L (ref 10–40)
BASOPHILS # BLD AUTO: 0.05 K/UL (ref 0–0.2)
BASOPHILS NFR BLD: 1.1 % (ref 0–1.9)
BILIRUB SERPL-MCNC: 0.5 MG/DL (ref 0.1–1)
BUN SERPL-MCNC: 17 MG/DL (ref 6–20)
CALCIUM SERPL-MCNC: 9.5 MG/DL (ref 8.7–10.5)
CHLORIDE SERPL-SCNC: 103 MMOL/L (ref 95–110)
CHOLEST SERPL-MCNC: 227 MG/DL (ref 120–199)
CHOLEST/HDLC SERPL: 2.3 {RATIO} (ref 2–5)
CO2 SERPL-SCNC: 25 MMOL/L (ref 23–29)
CREAT SERPL-MCNC: 0.9 MG/DL (ref 0.5–1.4)
DIFFERENTIAL METHOD BLD: NORMAL
EOSINOPHIL # BLD AUTO: 0.1 K/UL (ref 0–0.5)
EOSINOPHIL NFR BLD: 2.9 % (ref 0–8)
ERYTHROCYTE [DISTWIDTH] IN BLOOD BY AUTOMATED COUNT: 13.5 % (ref 11.5–14.5)
EST. GFR  (NO RACE VARIABLE): >60 ML/MIN/1.73 M^2
ESTIMATED AVG GLUCOSE: 100 MG/DL (ref 68–131)
GLUCOSE SERPL-MCNC: 81 MG/DL (ref 70–110)
HBA1C MFR BLD: 5.1 % (ref 4–5.6)
HCT VFR BLD AUTO: 42.5 % (ref 37–48.5)
HDLC SERPL-MCNC: 100 MG/DL (ref 40–75)
HDLC SERPL: 44.1 % (ref 20–50)
HGB BLD-MCNC: 13.6 G/DL (ref 12–16)
IMM GRANULOCYTES # BLD AUTO: 0.02 K/UL (ref 0–0.04)
IMM GRANULOCYTES NFR BLD AUTO: 0.4 % (ref 0–0.5)
LDLC SERPL CALC-MCNC: 119 MG/DL (ref 63–159)
LYMPHOCYTES # BLD AUTO: 1 K/UL (ref 1–4.8)
LYMPHOCYTES NFR BLD: 23.1 % (ref 18–48)
MCH RBC QN AUTO: 29.4 PG (ref 27–31)
MCHC RBC AUTO-ENTMCNC: 32 G/DL (ref 32–36)
MCV RBC AUTO: 92 FL (ref 82–98)
MONOCYTES # BLD AUTO: 0.5 K/UL (ref 0.3–1)
MONOCYTES NFR BLD: 10.1 % (ref 4–15)
NEUTROPHILS # BLD AUTO: 2.8 K/UL (ref 1.8–7.7)
NEUTROPHILS NFR BLD: 62.4 % (ref 38–73)
NONHDLC SERPL-MCNC: 127 MG/DL
NRBC BLD-RTO: 0 /100 WBC
PLATELET # BLD AUTO: 241 K/UL (ref 150–450)
PMV BLD AUTO: 11.3 FL (ref 9.2–12.9)
POTASSIUM SERPL-SCNC: 4.2 MMOL/L (ref 3.5–5.1)
PROT SERPL-MCNC: 7.7 G/DL (ref 6–8.4)
RBC # BLD AUTO: 4.62 M/UL (ref 4–5.4)
SODIUM SERPL-SCNC: 138 MMOL/L (ref 136–145)
TRIGL SERPL-MCNC: 40 MG/DL (ref 30–150)
TSH SERPL DL<=0.005 MIU/L-ACNC: 2 UIU/ML (ref 0.4–4)
VIT B12 SERPL-MCNC: 223 PG/ML (ref 210–950)
WBC # BLD AUTO: 4.45 K/UL (ref 3.9–12.7)

## 2025-03-14 PROCEDURE — 84443 ASSAY THYROID STIM HORMONE: CPT | Performed by: INTERNAL MEDICINE

## 2025-03-14 PROCEDURE — 83036 HEMOGLOBIN GLYCOSYLATED A1C: CPT | Performed by: INTERNAL MEDICINE

## 2025-03-14 PROCEDURE — 82607 VITAMIN B-12: CPT | Performed by: INTERNAL MEDICINE

## 2025-03-14 PROCEDURE — 83921 ORGANIC ACID SINGLE QUANT: CPT | Performed by: INTERNAL MEDICINE

## 2025-03-14 PROCEDURE — 85025 COMPLETE CBC W/AUTO DIFF WBC: CPT | Performed by: INTERNAL MEDICINE

## 2025-03-14 PROCEDURE — 82306 VITAMIN D 25 HYDROXY: CPT | Performed by: INTERNAL MEDICINE

## 2025-03-14 PROCEDURE — 80053 COMPREHEN METABOLIC PANEL: CPT | Performed by: INTERNAL MEDICINE

## 2025-03-14 PROCEDURE — 80061 LIPID PANEL: CPT | Performed by: INTERNAL MEDICINE

## 2025-03-17 ENCOUNTER — OFFICE VISIT (OUTPATIENT)
Dept: OBSTETRICS AND GYNECOLOGY | Facility: CLINIC | Age: 55
End: 2025-03-17
Payer: COMMERCIAL

## 2025-03-17 VITALS — BODY MASS INDEX: 24.96 KG/M2 | HEIGHT: 67 IN | WEIGHT: 159 LBS

## 2025-03-17 DIAGNOSIS — E66.3 OVERWEIGHT: ICD-10-CM

## 2025-03-17 DIAGNOSIS — N95.1 MENOPAUSAL SYMPTOM: Primary | ICD-10-CM

## 2025-03-17 PROCEDURE — 3008F BODY MASS INDEX DOCD: CPT | Mod: CPTII,95,, | Performed by: PHYSICIAN ASSISTANT

## 2025-03-17 PROCEDURE — 3044F HG A1C LEVEL LT 7.0%: CPT | Mod: CPTII,95,, | Performed by: PHYSICIAN ASSISTANT

## 2025-03-17 PROCEDURE — 1160F RVW MEDS BY RX/DR IN RCRD: CPT | Mod: CPTII,95,, | Performed by: PHYSICIAN ASSISTANT

## 2025-03-17 PROCEDURE — 98005 SYNCH AUDIO-VIDEO EST LOW 20: CPT | Mod: 95,,, | Performed by: PHYSICIAN ASSISTANT

## 2025-03-17 PROCEDURE — 1159F MED LIST DOCD IN RCRD: CPT | Mod: CPTII,95,, | Performed by: PHYSICIAN ASSISTANT

## 2025-03-17 NOTE — PROGRESS NOTES
The patient location is: home  The chief complaint leading to consultation is: follow up weight loss    Visit type: audiovisual    Face to Face time with patient: 10 minutes  15 minutes of total time spent on the encounter, which includes face to face time and non-face to face time preparing to see the patient (eg, review of tests), Obtaining and/or reviewing separately obtained history, Documenting clinical information in the electronic or other health record, Independently interpreting results (not separately reported) and communicating results to the patient/family/caregiver, or Care coordination (not separately reported).         Each patient to whom he or she provides medical services by telemedicine is:  (1) informed of the relationship between the physician and patient and the respective role of any other health care provider with respect to management of the patient; and (2) notified that he or she may decline to receive medical services by telemedicine and may withdraw from such care at any time.    Notes:  Subjective:      Naima Jeffries is a 54 y.o. female who presents for weight loss follow up. She is taking compounded semaglutide 0.25mg SC weekly. She took the medication for 1 month and then stopped when went to [place] earlier this month. Just refilled but has not restarted. Denies side effects but has not seen weight loss. Continues to eat well. She did have increased stress last week but resolved. Has had a headache today as well.     Routine labs: 3/14/2025    Lab Visit on 03/14/2025   Component Date Value Ref Range Status    WBC 03/14/2025 4.45  3.90 - 12.70 K/uL Final    RBC 03/14/2025 4.62  4.00 - 5.40 M/uL Final    Hemoglobin 03/14/2025 13.6  12.0 - 16.0 g/dL Final    Hematocrit 03/14/2025 42.5  37.0 - 48.5 % Final    MCV 03/14/2025 92  82 - 98 fL Final    MCH 03/14/2025 29.4  27.0 - 31.0 pg Final    MCHC 03/14/2025 32.0  32.0 - 36.0 g/dL Final    RDW 03/14/2025 13.5  11.5 - 14.5 % Final     Platelets 03/14/2025 241  150 - 450 K/uL Final    MPV 03/14/2025 11.3  9.2 - 12.9 fL Final    Immature Granulocytes 03/14/2025 0.4  0.0 - 0.5 % Final    Gran # (ANC) 03/14/2025 2.8  1.8 - 7.7 K/uL Final    Immature Grans (Abs) 03/14/2025 0.02  0.00 - 0.04 K/uL Final    Lymph # 03/14/2025 1.0  1.0 - 4.8 K/uL Final    Mono # 03/14/2025 0.5  0.3 - 1.0 K/uL Final    Eos # 03/14/2025 0.1  0.0 - 0.5 K/uL Final    Baso # 03/14/2025 0.05  0.00 - 0.20 K/uL Final    nRBC 03/14/2025 0  0 /100 WBC Final    Gran % 03/14/2025 62.4  38.0 - 73.0 % Final    Lymph % 03/14/2025 23.1  18.0 - 48.0 % Final    Mono % 03/14/2025 10.1  4.0 - 15.0 % Final    Eosinophil % 03/14/2025 2.9  0.0 - 8.0 % Final    Basophil % 03/14/2025 1.1  0.0 - 1.9 % Final    Differential Method 03/14/2025 Automated   Final    Sodium 03/14/2025 138  136 - 145 mmol/L Final    Potassium 03/14/2025 4.2  3.5 - 5.1 mmol/L Final    Chloride 03/14/2025 103  95 - 110 mmol/L Final    CO2 03/14/2025 25  23 - 29 mmol/L Final    Glucose 03/14/2025 81  70 - 110 mg/dL Final    BUN 03/14/2025 17  6 - 20 mg/dL Final    Creatinine 03/14/2025 0.9  0.5 - 1.4 mg/dL Final    Calcium 03/14/2025 9.5  8.7 - 10.5 mg/dL Final    Total Protein 03/14/2025 7.7  6.0 - 8.4 g/dL Final    Albumin 03/14/2025 4.2  3.5 - 5.2 g/dL Final    Total Bilirubin 03/14/2025 0.5  0.1 - 1.0 mg/dL Final    Alkaline Phosphatase 03/14/2025 42  40 - 150 U/L Final    AST 03/14/2025 20  10 - 40 U/L Final    ALT 03/14/2025 16  10 - 44 U/L Final    eGFR 03/14/2025 >60.0  >60 mL/min/1.73 m^2 Final    Anion Gap 03/14/2025 10  8 - 16 mmol/L Final    Cholesterol 03/14/2025 227 (H)  120 - 199 mg/dL Final    Triglycerides 03/14/2025 40  30 - 150 mg/dL Final    HDL 03/14/2025 100 (H)  40 - 75 mg/dL Final    LDL Cholesterol 03/14/2025 119.0  63.0 - 159.0 mg/dL Final    HDL/Cholesterol Ratio 03/14/2025 44.1  20.0 - 50.0 % Final    Total Cholesterol/HDL Ratio 03/14/2025 2.3  2.0 - 5.0 Final    Non-HDL Cholesterol 03/14/2025  127  mg/dL Final    TSH 03/14/2025 2.002  0.400 - 4.000 uIU/mL Final    Hemoglobin A1C 03/14/2025 5.1  4.0 - 5.6 % Final    Estimated Avg Glucose 03/14/2025 100  68 - 131 mg/dL Final    Vit D, 25-Hydroxy 03/14/2025 42  30 - 96 ng/mL Final    Vitamin B-12 03/14/2025 223  210 - 950 pg/mL Final   Lab Visit on 01/02/2025   Component Date Value Ref Range Status    HIV 1/2 Ag/Ab 01/02/2025 Negative  Negative Final    Hepatitis B Surface Ag 01/02/2025 Non-reactive  Non-reactive Final    [facility] Miscellaneous Test 1 01/02/2025 SEE NOTE   Final   Office Visit on 01/02/2025   Component Date Value Ref Range Status    Final Pathologic Diagnosis 01/02/2025    Final                    Value:Specimen Adequacy  Satisfactory for interpretation. Endocervical component is absent.    [place] Category  Negative for intraepithelial lesion or malignancy.  Inflammation present.  Shift in leo suggestive of bacterial vaginosis.      Disclaimer 01/02/2025    Final                    Value:The Pap smear is a screening test that aids in the detection of cervical cancer and cancer precursors. Both false positive and false negative results can occur. The test should be used at regular intervals, and positive results should be confirmed before   definitive therapy.  This liquid based specimen is processed using the , , or  [facility] Thin PrepPAP System. This specimen has been analyzed by the ThinPrep Imaging System ([employer]), an automated imaging and review system which assists the laboratory in   evaluating cells on ThinPrep PAP tests. Following automated imaging, selected fields from every slide are reviewed by a cytotechnologist and/or pathologist.     Screening was performed at [facility], [address], [place] [address].      HPV other High Risk types, PCR 01/02/2025 Negative  Negative Final    HPV High Risk type 16, PCR 01/02/2025 Negative  Negative Final    HPV High  Risk type 18, PCR 2025 Negative  Negative Final    Chlamydia, Amplified DNA 2025 Not Detected  Not Detected Final    N gonorrhoeae, amplified DNA 2025 Not Detected  Not Detected Final       Past Medical History:   Diagnosis Date    Abnormal Pap smear of cervix     Anxiety     Depression     Fever blister     S/P D&C (status post dilation and curettage)      Past Surgical History:   Procedure Laterality Date    BREAST BIOPSY Right 2018    benign    COLONOSCOPY N/A 2022    Procedure: COLONOSCOPY;  Surgeon: Akua Haider MD;  Location: [facility]  Service: Endoscopy;  Laterality: N/A;  original order from Dr Florian on 21-unusable. new order placed        vaccinated-GT    COLONOSCOPY N/A 2022    Procedure: COLONOSCOPY;  Surgeon: Gael Rico MD;  Location: [facility]  Service: Endoscopy;  Laterality: N/A;  old case request unusable, new case request placed  fully vaccinated, instructions emailed to [email or web address]  golytely. pre call complete A.s    EXCISION OF PTERYGIUM Right     LASER ABLATION OF THE CERVIX       Social History[1]  Family History   Problem Relation Name Age of Onset    Stroke Mother      Breast cancer Paternal Aunt      Breast cancer Cousin paternal     Colon cancer Neg Hx      Ovarian cancer Neg Hx      Uterine cancer Neg Hx      Cervical cancer Neg Hx      Cancer Neg Hx       OB History    Para Term  AB Living   4 2 1 1 2 2   SAB IAB Ectopic Multiple Live Births   1    2      # Outcome Date GA Lbr Solomon/2nd Weight Sex Type Anes PTL Lv   4 Term 03/25/15    M Vag-Spont   JOE   3 SAB      SAB      2  09/10/11 35w0d   M Vag-Spont   JEO   1 AB              Current Medications[2]    Review of Systems:  General: No fever, chills.  Chest: No chest pain, shortness of breath, or palpitations.  Breast: No pain, masses, or nipple discharge.  Vulva: No pain, lesions, or itching.  Vagina: No  "relaxation, itching, discharge, or lesions.  Abdomen: No pain, nausea, vomiting, diarrhea, or constipation.  Urinary: No incontinence, nocturia, frequency, or dysuria.  Extremities:  No leg cramps, edema, or calf pain.  Neurologic: No headaches, dizziness, or visual changes.    Objective:     Vitals:    03/17/25 1424   Weight: 72.1 kg (159 lb)   Height: 5' 7" (1.702 m)     Body mass index is 24.9 kg/m².    PHYSICAL EXAM:  APPEARANCE: Well nourished, well developed, in no acute distress.  AFFECT: WNL, alert and oriented x 3      Assessment:      Menopausal symptom    Overweight  -     semaglutide, weight loss, 0.5 mg/0.5 mL PnIj; Inject 0.5 mg into the skin every 7 days.        Plan:   Continue low glycemic diet with lean protein at each meal.  Maintain hydration.  Continue semaglutide 0.25mg Sc weekly that she has x 4 weeks.  Then increase semaglutide to 0.5mg SC weekly- faxed to [facility]ang  Add mg glycinate 500mg QHS  Follow up in 3 months    Instructed patient to call if she experiences any side effects or has any questions.    I spent a total of 15 minutes on the day of the visit.This includes face to face time and non-face to face time preparing to see the patient (eg, review of tests), obtaining and/or reviewing separately obtained history, documenting clinical information in the electronic or other health record, independently interpreting results and communicating results to the patient/family/caregiver, or care coordinator.          [1]   Social History  Tobacco Use    Smoking status: Never     Passive exposure: Never    Smokeless tobacco: Never   Substance Use Topics    Alcohol use: Yes     Comment: socially    Drug use: Never   [2]   Current Outpatient Medications:     (Magic mouthwash) 1:1:1 diphenhydrAMINE(Benadryl) 12.5mg/5ml liq, aluminum & magnesium hydroxide-simethicone (Maalox), LIDOcaine viscous 2%, Swish and spit 10 mLs every 4 (four) hours as needed (sore throat)., Disp: 180 mL, Rfl: 0    boric " acid (BORIC ACID) vaginal suppository, Place 1 each (650 mg total) vaginally nightly as needed (vaginitits)., Disp: 30 suppository, Rfl: 5    EScitalopram oxalate (LEXAPRO) 10 MG tablet, Take 1 tablet (10 mg total) by mouth once daily., Disp: 90 tablet, Rfl: 3    estradioL (VIVELLE-DOT) 0.025 mg/24 hr, Place 1 patch onto the skin twice a week., Disp: 8 patch, Rfl: 11    minoxidiL (LONITEN) 2.5 MG tablet, Half - one tablet daily, Disp: 30 tablet, Rfl: 3    predniSONE (DELTASONE) 20 MG tablet, Take 1 tablet (20 mg total) by mouth once daily., Disp: 5 tablet, Rfl: 0    progesterone (PROMETRIUM) 100 MG capsule, Take 1 capsule (100 mg total) by mouth nightly., Disp: 30 capsule, Rfl: 11    semaglutide, weight loss, 0.5 mg/0.5 mL PnIj, Inject 0.5 mg into the skin every 7 days., Disp: , Rfl:     UNABLE TO FIND, medication name: Testosterone 1% Cream Apply 1 click to the upper inner thigh daily, Disp: 30 g, Rfl: 5

## 2025-03-18 LAB — METHYLMALONATE SERPL-SCNC: 0.38 UMOL/L

## 2025-03-26 ENCOUNTER — OFFICE VISIT (OUTPATIENT)
Dept: ORTHOPEDICS | Facility: CLINIC | Age: 55
End: 2025-03-26
Payer: COMMERCIAL

## 2025-03-26 DIAGNOSIS — M25.551 RIGHT HIP PAIN: ICD-10-CM

## 2025-03-26 PROCEDURE — 1160F RVW MEDS BY RX/DR IN RCRD: CPT | Mod: CPTII,S$GLB,, | Performed by: NURSE PRACTITIONER

## 2025-03-26 PROCEDURE — 99999 PR PBB SHADOW E&M-EST. PATIENT-LVL III: CPT | Mod: PBBFAC,,, | Performed by: NURSE PRACTITIONER

## 2025-03-26 PROCEDURE — 1159F MED LIST DOCD IN RCRD: CPT | Mod: CPTII,S$GLB,, | Performed by: NURSE PRACTITIONER

## 2025-03-26 PROCEDURE — 99214 OFFICE O/P EST MOD 30 MIN: CPT | Mod: S$GLB,,, | Performed by: NURSE PRACTITIONER

## 2025-03-26 PROCEDURE — 3044F HG A1C LEVEL LT 7.0%: CPT | Mod: CPTII,S$GLB,, | Performed by: NURSE PRACTITIONER

## 2025-03-26 NOTE — PROGRESS NOTES
CC: Pain of the Right Hip and Pain of the Left Hip      HPI:   Pt with c/o right hip pain that has gotten progressively worse over the past few years. When she was still living in [place], she remembers having a procedure where they put dye in the hip and something that made it feel better, but she's not sure what it was called. She saw Dr. Trevino in 2023, but she didn't think the pain was too terrible at that point, so she was taking advil as needed at that point. She was not ready for surgery.  The pain that she's having is located over the right groin and goes around to the buttock in a C-sign distribution. The pain has limited her ability to walk for long distances or hike. She has tried PT, but had to stop due to pain. She belongs to a gym and has been doing yoga to try to build up the muscles that support her hip. She does find increased pain after certain exercises.  She works as an  for [employer], but she just got furloughed due to changes with the [employer] that was funding her position.   She lives at home with her two sons- 11 and 14. She helps out with her mom who is bed-bound following strokes.  She has taken advil with little relief. She is ambulating without assistive device.       ROS  General: denies fever and chills  Resp: no c/o sob  CVS: no c/o cp  MSK: c/o right hip pain in a c-sign distribution     PE  General: AAOx3, pleasant and cooperative  Resp: respirations even and unlabored  MSK: right hip exam  + stinchfield  - straight leg raise  + pain with internal rotation  + pain with external rotation  - pain over the greater trochanter       Xray:  Reviewed xray done by her pcp which shows moderate arthritic changes in the right and left hips    Assessment:  Right hip djd    Plan:  Message sent to Casey for scheduling a clinic appt to discuss surgery vs cortisone injection for treatment of progressive moderate arthritic changes.

## 2025-03-27 ENCOUNTER — PATIENT MESSAGE (OUTPATIENT)
Dept: ORTHOPEDICS | Facility: CLINIC | Age: 55
End: 2025-03-27
Payer: COMMERCIAL

## 2025-04-24 ENCOUNTER — OFFICE VISIT (OUTPATIENT)
Dept: ORTHOPEDICS | Facility: CLINIC | Age: 55
End: 2025-04-24
Payer: COMMERCIAL

## 2025-04-24 VITALS — WEIGHT: 165.25 LBS | HEIGHT: 67 IN | BODY MASS INDEX: 25.94 KG/M2

## 2025-04-24 DIAGNOSIS — M16.11 PRIMARY OSTEOARTHRITIS OF RIGHT HIP: Primary | ICD-10-CM

## 2025-04-24 DIAGNOSIS — M25.511 RIGHT SHOULDER PAIN, UNSPECIFIED CHRONICITY: ICD-10-CM

## 2025-04-24 PROCEDURE — 3044F HG A1C LEVEL LT 7.0%: CPT | Mod: CPTII,S$GLB,, | Performed by: ORTHOPAEDIC SURGERY

## 2025-04-24 PROCEDURE — 3008F BODY MASS INDEX DOCD: CPT | Mod: CPTII,S$GLB,, | Performed by: ORTHOPAEDIC SURGERY

## 2025-04-24 PROCEDURE — 1159F MED LIST DOCD IN RCRD: CPT | Mod: CPTII,S$GLB,, | Performed by: ORTHOPAEDIC SURGERY

## 2025-04-24 PROCEDURE — 99999 PR PBB SHADOW E&M-EST. PATIENT-LVL III: CPT | Mod: PBBFAC,,, | Performed by: ORTHOPAEDIC SURGERY

## 2025-04-24 PROCEDURE — 99214 OFFICE O/P EST MOD 30 MIN: CPT | Mod: S$GLB,,, | Performed by: ORTHOPAEDIC SURGERY

## 2025-04-24 RX ORDER — MELOXICAM 15 MG/1
15 TABLET ORAL DAILY
Qty: 30 TABLET | Refills: 2 | Status: SHIPPED | OUTPATIENT
Start: 2025-04-24 | End: 2025-07-23

## 2025-04-24 NOTE — PROGRESS NOTES
"  Subjective:     HPI:   Naima Jeffries is a 54 y.o. female who presents for eval R hip     Seen 23:   She thinks symptoms began in  during childbirth.  She went to a chiropractor and things got better.  More recently she is developed some intermittent symptoms over the past months to years.  Symptoms are intermittent mostly groin pain occasional C-sign distribution to the buttock.  Occasional anterior thigh discomfort which feels weak no radicular pain or paresthesias.  She has some mechanical symptoms sometimes it feels like her hip and leg gets stuck.  She says it is worse in the morning or if she does strengthening exercises like taking 2 steps at a time     Medications: Ibuprofen (400mg, PRN) 2 tab over past couple weeks, taking PRN     Injections: ?IA injection for MRI with contrast done in , did provide some relief for a couple months     Physical Therapy: Yes, 2019 completed OP-PT when she was living in [place], she said that she did PT for her lower back and right hip stopped due to move back to [facility]     Assistive Devices: None      Walkin - 5 blocks     Limitations:      Worse in morning: Bending over, Pick something up off floor all early in the day  Better as day goes on     Occupation: The patient works as an  for [employer]. Immigrants rights     Social support: The patient stated that they live at home with alone with her two kids. The patient stated that their Dad  would be able to help take care of them if they were to have surgery.      The patient moved from [place] back to [place] in .     Dx early R hip OA - ref PCSM     SB 3/26/25:   "Pt with c/o right hip pain that has gotten progressively worse over the past few years. When she was still living in [place], she remembers having a procedure where they put dye in the hip and something that made it feel better, but she's not sure what it was called. She saw Dr. Trevino in , but she didn't think the " "pain was too terrible at that point, so she was taking advil as needed at that point. She was not ready for surgery.  The pain that she's having is located over the right groin and goes around to the buttock in a C-sign distribution. The pain has limited her ability to walk for long distances or hike. She has tried PT, but had to stop due to pain. She belongs to a gym and has been doing yoga to try to build up the muscles that support her hip. She does find increased pain after certain exercises.  She works as an  for [employer], but she just got furloughed due to changes with the crystal that was funding her position.   She lives at home with her two sons- 11 and 14. She helps out with her mom who is bed-bound following strokes.  She has taken advil with little relief. She is ambulating without assistive device."    Referred for R DEANDRE    Today:   R hip, progressive   Groin/ant hip c-sign around lat and post lat  + "muscle stuff" AT to knee, no RAD  Up to 9/10     Difficult time gettting to PT  Does lots of yoga and pilates    Also: Some paresthesia of R arm intermittently feels like it is stemming from her neck   Points to past shoulder/shoulder blade, with pain shooting down arm, with pain at night     Just furloughed from her job, lost federal funding   Going to do cobra     Boyfriend and dad can help (but mom bed bound after stroke, has caregivers)    History of Present Illness    CHIEF COMPLAINT:  - Right hip pain and right shoulder pain.    HPI:  Ms. Jeffries presents with hip pain and shoulder discomfort. Her hip pain is localized to the interior and posterior regions. Pain is most severe in the morning, rated as 9/10. She reports increased pain when standing up and starting to walk, particularly in the morning. Discomfort occurs during the night, requiring position changes. A recent incident involved a shooting pain upon standing. She notes muscular changes and limping due to the hip " condition.    Her hip condition has progressed since 2023, as evidenced by XR comparisons. She reports difficulty with cardio exercises, impacting her ability to perform normal activities. Non-operative treatments have been attempted, including yoga, Pilates, and strengthening exercises at the [place] [facility]. Recent PT for a foot issue is also mentioned. Her active lifestyle, including activities such as hiking, is now impacted by the hip condition.    Regarding the shoulder, she reports pain under the shoulder blade with radiation down the arm, particularly at night. Numbness and tingling are confirmed. This shoulder issue has been ongoing but has recently become more acute.    She was recently furloughed and then terminated from her job at [employer], losing health insurance. Options for continued coverage, including COBRA and the insurance marketplace, are being considered.    Ms. Jeffries denies pain in the front of the thigh and sciatica. She denies any medical diagnoses.    PREVIOUS TREATMENTS:  - Yoga and strengthening exercises  - Pilates at [facility] to strengthen muscles around the hip  - PT for a foot issue, which included some hip therapy    MEDICATIONS:  - Lexapro  - Advil: as needed  - Estrogen patch    FAMILY HISTORY:  - Mother: stroke and surgery    WORK STATUS:  - Recently furloughed and then terminated from job at [employer]  - Worked in the [employer] department  - Employed for nearly 5 years before termination    SOCIAL HISTORY:  - Marital Status: Has a boyfriend  - Previously worked for [employer]            Objective:   Body mass index is 25.88 kg/m².  Exam:    Physical Exam    Musculoskeletal: Slight limp, slight antalgic gait, prolonged stance phase on the right hip. Negative Trendelenburg. Mild discomfort with extra straight leg raise. Zero to 100 hip flexion, 30 abduction, 20 adduction, 50 external, 20 internal rotation. Pain with flexion,  "adduction, and internal rotation. Good knee range and motion no pain. Tender palpation greater trochanter's consistent with bursitis. Good abductor strength for the right shoulder. 5/5 strength throughout the arm.  IMAGING:  - XR Hip: 2023, showed narrowing of the joint space on the right side  - XR Hip: current, significant narrowing of the joint space on the right side, with the patient being "bone on bone"  - XR Shoulder: showed no significant findings         0 LLD, skin ok  TTP B GT    R shoulder points to medial/inf border scapula/rhomboids, NVI     Imaging:    Results              HIP R ARTHRITIS        Indication:  Right hip pain  Exam Ordered: Radiographs include an anteroposterior pelvis, an anteroposterior and lateral view of the proximal femur including the hip joint.  Details of Examination: Exam shows evidence of joint space narrowing, osteophyte formation, and subchondral sclerosis, all consistent with degenerative arthritis of the hip.  No other significant findings are noted.  Impression:  Degenerative Arthritis, Right Hip    R hip Tg3 R hip OA bone on bone, sig progression since 2023      Assessment/Plan:       ICD-10-CM ICD-9-CM   1. Primary osteoarthritis of right hip  M16.11 715.15   2. Right shoulder pain, unspecified chronicity  M25.511 719.41            Assessment/Plan:     Assessment & Plan    RIGHT HIP OSTEOARTHRITIS:   Discussed hip replacement as a potential future procedure for hip arthritis.   Discussed possibility of hip joint steroid injection, explaining potential benefits of pain relief for days, weeks, or months. 90-day waiting period for surgery if injection is done.   Started Mobic (Meloxicam) daily with food.   Take Tylenol Arthritis 1-3 times daily as needed.   Referred to PT for hip.    RIGHT SHOULDER PAIN AND STIFFNESS:   Referred to shoulder specialist.   Referred to PT for right shoulder scapular.         The above findings were discussed with patient length. We discussed " the risks of conservative versus surgical management of the patients hip arthritis. Conservative management consisting of anti-inflammatory medications, weight loss, physical therapy, and activity modification was discussed at length. At this point considering the patient's level of activity, pain, and radiographic findings I recommend continued conservative management and DEANDRE when ready     Headed towards DEANDRE, tentative date 7/17  She is unsure  Discussed op and non-op options     -start with PT for R shoulder scapular dysfunction) and R hip  -Rx mobic + tylenol arthritis  -ref [facility]M for shoulder  -DEANDRE info  -logistics with insurance    F/u in 1 month repeat eval , discuss DEANDRE     Orders Placed This Encounter   Procedures    Ambulatory Referral/Consult to Physical Therapy     Standing Status:   Future     Expected Date:   5/1/2025     Expiration Date:   5/24/2026     Referral Priority:   Routine     Referral Type:   Physical Therapy     Referral Reason:   Specialty Services Required     Number of Visits Requested:   1    Ambulatory referral/consult to Sports Medicine     Standing Status:   Future     Expected Date:   5/1/2025     Expiration Date:   5/24/2026     Referral Priority:   Routine     Referral Type:   Consultation     Referral Reason:   Specialty Services Required     Requested Specialty:   Sports Medicine     Number of Visits Requested:   1       This note was generated with the assistance of ambient listening technology. Verbal consent was obtained by the patient and accompanying visitor(s) for the recording of patient appointment to facilitate this note. I attest to having reviewed and edited the generated note for accuracy, though some syntax or spelling errors may persist. Please contact the author of this note for any clarification.      Medications Ordered This Encounter   Medications    meloxicam (MOBIC) 15 MG tablet     Sig: Take 1 tablet (15 mg total) by mouth once daily. Additional refills  should come from primary care provider     Dispense:  30 tablet     Refill:  2        Past Medical History:   Diagnosis Date    Abnormal Pap smear of cervix 1989    Anxiety     Depression     Fever blister     S/P D&C (status post dilation and curettage)        Past Surgical History:   Procedure Laterality Date    BREAST BIOPSY Right 2018    benign    COLONOSCOPY N/A 07/28/2022    Procedure: COLONOSCOPY;  Surgeon: Akua Haider MD;  [facility]  Service: Endoscopy;  Laterality: N/A;  original order from Dr Florian on 11/5/21-unusable. new order placed        vaccinated-GT    COLONOSCOPY N/A 11/09/2022    Procedure: COLONOSCOPY;  Surgeon: Gael Rico MD;  Location: [facility] ENDO (4TH FLR);  Service: Endoscopy;  Laterality: N/A;  old case request unusable, new case request placed  fully vaccinated, instructions emailed to [email or web address]  golytely. pre call complete A.s    EXCISION OF PTERYGIUM Right     LASER ABLATION OF THE CERVIX  1989       Family History   Problem Relation Name Age of Onset    Stroke Mother      Breast cancer Paternal Aunt      Breast cancer Cousin paternal     Colon cancer Neg Hx      Ovarian cancer Neg Hx      Uterine cancer Neg Hx      Cervical cancer Neg Hx      Cancer Neg Hx         Social History     Socioeconomic History    Marital status: Single   Tobacco Use    Smoking status: Never     Passive exposure: Never    Smokeless tobacco: Never   Substance and Sexual Activity    Alcohol use: Yes     Comment: socially    Drug use: Never    Sexual activity: Yes     Partners: Male     Birth control/protection: None     Social Drivers of Health     Financial Resource Strain: Low Risk  (4/13/2024)    Overall Financial Resource Strain (CARDIA)     Difficulty of Paying Living Expenses: Not hard at all   Food Insecurity: No Food Insecurity (4/13/2024)    Hunger Vital Sign     Worried About Running Out of Food in the Last Year: Never true     Ran Out of Food in the  Last Year: Never true   Transportation Needs: No Transportation Needs (4/13/2024)    PRAPARE - Transportation     Lack of Transportation (Medical): No     Lack of Transportation (Non-Medical): No   Physical Activity: Insufficiently Active (4/13/2024)    Exercise Vital Sign     Days of Exercise per Week: 3 days     Minutes of Exercise per Session: 40 min   Stress: Stress Concern Present (4/13/2024)    [employer] - Occupational Stress Questionnaire     Feeling of Stress : To some extent   Housing Stability: Unknown (4/13/2024)    Housing Stability Vital Sign     Unable to Pay for Housing in the Last Year: No

## 2025-04-29 ENCOUNTER — PATIENT MESSAGE (OUTPATIENT)
Dept: OBSTETRICS AND GYNECOLOGY | Facility: CLINIC | Age: 55
End: 2025-04-29
Payer: COMMERCIAL

## 2025-04-29 ENCOUNTER — PATIENT MESSAGE (OUTPATIENT)
Dept: INTERNAL MEDICINE | Facility: CLINIC | Age: 55
End: 2025-04-29
Payer: COMMERCIAL

## 2025-05-05 ENCOUNTER — CLINICAL SUPPORT (OUTPATIENT)
Dept: REHABILITATION | Facility: HOSPITAL | Age: 55
End: 2025-05-05
Payer: COMMERCIAL

## 2025-05-05 DIAGNOSIS — M25.69 DECREASED RANGE OF MOTION OF TRUNK AND BACK: ICD-10-CM

## 2025-05-05 DIAGNOSIS — M16.11 PRIMARY OSTEOARTHRITIS OF RIGHT HIP: ICD-10-CM

## 2025-05-05 DIAGNOSIS — R29.898 WEAKNESS OF RIGHT SHOULDER: ICD-10-CM

## 2025-05-05 DIAGNOSIS — R29.898 WEAKNESS OF RIGHT HIP: Primary | ICD-10-CM

## 2025-05-05 DIAGNOSIS — M25.511 RIGHT SHOULDER PAIN, UNSPECIFIED CHRONICITY: ICD-10-CM

## 2025-05-05 PROCEDURE — 97110 THERAPEUTIC EXERCISES: CPT

## 2025-05-05 PROCEDURE — 97161 PT EVAL LOW COMPLEX 20 MIN: CPT

## 2025-05-05 NOTE — PROGRESS NOTES
Outpatient Rehab    Physical Therapy Evaluation    Patient Name: Naima Jeffries  MRN: [id number]  YOB: 1970  Encounter Date: 5/5/2025    Therapy Diagnosis:   Encounter Diagnoses   Name Primary?    Primary osteoarthritis of right hip     Right shoulder pain, unspecified chronicity     Weakness of right hip Yes    Decreased range of motion of trunk and back     Weakness of right shoulder      Physician: Kar Trevino III, *    Physician Orders: Eval and Treat  Medical Diagnosis: Primary osteoarthritis of right hip  Right shoulder pain, unspecified chronicity    Visit # / Visits Authorized: 1 / 1  Insurance Authorization Period: 5/2/2025 to 12/31/2025  Date of Evaluation: 5/5/2025  Plan of Care Certification: 5/5/2025 to 6/30/2025  (Give FOTO next visit)    Time In: 1020   Time Out: 1110  Total Time (in minutes): 50   Total Billable Time (in minutes): 50 minutes     Intake Outcome Measure for FOTO Survey    Therapist reviewed FOTO scores for Naima Jeffries on 5/5/2025.   FOTO report - see Media section or FOTO account episode details.     Intake Score:  %       Subjective   History of Present Illness  Naima is a 54 y.o. female who reports to physical therapy with a chief concern of chronic Right hip pain for several years. She was told by MD that she has arthitis in her hip. She also reports Right shoulder / upper back pain. She states hip pain is worse in morning time, with prolonged standing and walking.   Hip pain is limiting her ability to participate in working out.  Regarding the shoulder, she reports pain under the shoulder blade with radiation down the arm, particularly at night. Numbness and tingling are confirmed. This shoulder issue has been ongoing but has recently become more acute.       Pain     Patient reports a current pain level of 6/10. Pain at best is reported as 4/10. Pain at worst is reported as 8/10.   Location: Right hip and Right shoulder  Pain-Aggravating Factors: Standing, Stair  climbing, Walking       Past Medical History/Physical Systems Review:   Naima Jeffries  has a past medical history of Abnormal Pap smear of cervix, Anxiety, Depression, Fever blister, and S/P D&C (status post dilation and curettage).    Naima Jeffries  has a past surgical history that includes Excision of pterygium (Right); Breast biopsy (Right, 2018); Colonoscopy (N/A, 07/28/2022); Colonoscopy (N/A, 11/09/2022); and Laser ablation of the cervix (1989).    Naima has a current medication list which includes the following prescription(s): escitalopram oxalate, estradiol, meloxicam, prednisone, progesterone, and UNABLE TO FIND.    Review of patient's allergies indicates:  No Known Allergies     Objective        Lumbar Range of Motion:    % Limitation Pain   Flexion No loss           Extension No loss           Left Side Bending No loss         Right Side Bending No loss         Left rotation   25% loss         Right Rotation   25% loss            Hip Range of Motion:   Right  Left Goal   Hip Extension 20 deg 20 deg 30 deg.   Hip External Rotation (hip flexed to 90) 40 deg 40 deg 45 deg.   Hip Internal Rotation (hip flexed to 90) 30 deg, p! 40 deg 45 deg.   Hip Flexion 95 deg, p! 100 deg 120 deg.     Lower Extremity Strength  Right LE  Left LE  Goal   Knee extension: 5/5 Knee extension: 5/5 5/5   Knee flexion: 5/5 Knee flexion: 5/5 5/5   Hip flexion: 4/5, p! Hip flexion: 4/5 5/5   Hip extension:  4-/5 Hip extension: 4/5 5/5   Hip abduction: 4-/5 Hip abduction: 4/5 5/5   Hip IR: 4+/5 Hip IR: 4+/5 5/5   Hip ER: 4/5 Hip ER:  4+/5 5/5     Special Tests:   Right Left   ENRRIQUE Positive Negative   FADIR Positive Negative   SCOUR Positive Negative     DTR:   Right Left Comment   Patellar (L3-4) 2+ 2+    Achilles (S1) 2+ 2+      Neuro Dynamic Testing:    Sciatic nerve:      SLR: R = Negative     L = Negative     Sensation: Intact bilateral LE      Cervical Range of Motion:    Degrees Pain   Flexion 45      Extension 50      Right  Rotation 60      Left Rotation 55      Right Side Bending 20    Left Side Bending 20       Shoulder Range of Motion: (Measured in degrees)  Shoulder Left Right   Flexion 180 175   Abduction 180 175   Functional   ER T5 T5   Functional  IR L3 L3     Upper Extremity Strength:  (R) UE  (L) UE  Goals   Shoulder flexion: 5/5 Shoulder flexion: 5/5 5/5   Shoulder Abduction: 4+/5 Shoulder abduction: 5/5 5/5   Shoulder ER 4/5 Shoulder ER 5/5 5/5   Shoulder IR 5/5 Shoulder IR 5/5 5/5   Lower Trap NT Lower Trap NT 5/5   Middle Trap NT Middle Trap NT 5/5   Rhomboids NT Rhomboids NT 5/5       Palpation: TTP Right greater trochanter                     TTP Right rhomboids         Treatment:     Therapeutic Exercise for 10 minutes:    HEP instruction:    Bridges with band  Sidelying hip abduction  Sidelying clamshell with band  Sidelying open book  Bilateral ER    Time Entry(in minutes):  PT Evaluation (Low) Time Entry: 40  Therapeutic Exercise Time Entry: 10    Assessment & Plan   Assessment  Naima presents with a condition of Low complexity.   Presentation of Symptoms: Stable       Functional Limitations: Activity tolerance, Standing tolerance, Squatting, Painful locomotion/ambulation, Pain with ADLs/IADLs  Impairments: Abnormal or restricted range of motion, Impaired physical strength    Prognosis: Good  Assessment Details: Naima is a 54 y.o. female referred to outpatient Physical Therapy with a medical diagnosis of Primary osteoarthritis of right hip, Right shoulder pain, unspecified chronicity. Upon physical assessment, patient demonstrates: decreased Right hip range of motion, Right hip weakness, Right shoulder weakness, tenderness to palpation to Right greater trochanter. Patient will benefit from skilled outpatient Physical Therapy to address the deficits stated, provide patient education, and to maximize patient's quality of life.     Plan  From a physical therapy perspective, the patient would benefit from: Skilled  Rehab Services    Planned therapy interventions include: Therapeutic exercise, Therapeutic activities, Neuromuscular re-education, Manual therapy, and Gait training.    Planned modalities to include: Biofeedback, Cryotherapy (cold pack), Electrical stimulation - attended, Electrical stimulation - passive/unattended, and Thermotherapy (hot pack).        Visit Frequency: 2 times Per Week for 8 Weeks.       This plan was discussed with Patient.   Discussion participants: Agreed Upon Plan of Care         Patient's spiritual, cultural, and educational needs considered and patient agreeable to plan of care and goals.         Goals:   Active       Long Term Goals       Long Term Goals       Start:  05/13/25    Expected End:  06/30/25       1.     Patient will demonstrate bilateral cervical rotation AROM at least 65 degrees.   2.     Patient will demonstrate Right hip strength 5/5.  3.     Pain Rating at Worst: 2/10 or less to improve overall Quality of Life.    4.     Patient will meet predicted functional outcome (FOTO) score: 10% improvement in functional ability or greater to increase self-perceived functional ability.  5.     Patient will be independent with updated HEP at discharge.               Short Term Goals       Short Term Goals       Start:  05/13/25    Expected End:  05/30/25       1.     Patient will be independent with HEP to supplement therapy in return to maximal function.  2.     Pain rating at Worst: 5/10 or less for improved tolerance with walking.   3.     Patient will demonstrate Right hip strength 4+/5.                Mirela Blunt PT

## 2025-05-08 ENCOUNTER — HOSPITAL ENCOUNTER (OUTPATIENT)
Dept: RADIOLOGY | Facility: HOSPITAL | Age: 55
Discharge: HOME | End: 2025-05-08
Attending: OBSTETRICS & GYNECOLOGY
Payer: COMMERCIAL

## 2025-05-08 VITALS — HEIGHT: 67 IN | BODY MASS INDEX: 25.9 KG/M2 | WEIGHT: 165 LBS

## 2025-05-08 DIAGNOSIS — Z12.31 SCREENING MAMMOGRAM, ENCOUNTER FOR: ICD-10-CM

## 2025-05-08 PROCEDURE — 77067 SCR MAMMO BI INCL CAD: CPT | Mod: TC

## 2025-05-12 ENCOUNTER — RESULTS FOLLOW-UP (OUTPATIENT)
Dept: OBSTETRICS AND GYNECOLOGY | Facility: CLINIC | Age: 55
End: 2025-05-12

## 2025-05-12 ENCOUNTER — TELEPHONE (OUTPATIENT)
Dept: SPORTS MEDICINE | Facility: CLINIC | Age: 55
End: 2025-05-12
Payer: COMMERCIAL

## 2025-05-12 NOTE — TELEPHONE ENCOUNTER
Returned call, pt accepted appt at 2:45pm same day.    Priscila Dillard MS, OTC  Clinical Assistant to Dr. Eliane Edgar      ----- Message from Tyshawn Anne sent at 5/12/2025  2:32 PM CDT -----  Regarding: returning call  Contact: pt [phone]   Type:  Patient Returning CallWho Called: Naima Who Left Message for Patient:priscila Does the patient know what this is regarding?:yes Would the patient rather a call back or a response via [facility]? Call back Best Call Back Number: pt [phone] Information:

## 2025-05-12 NOTE — TELEPHONE ENCOUNTER
BRIDGETM requesting return phone call to r/s appt to FERNANDO Dillard MS, OTC  Clinical Assistant to Dr. Eliane Edgar

## 2025-05-13 PROBLEM — R29.898 WEAKNESS OF RIGHT HIP: Status: ACTIVE | Noted: 2025-05-13

## 2025-05-13 PROBLEM — R29.898 WEAKNESS OF RIGHT SHOULDER: Status: ACTIVE | Noted: 2025-05-13

## 2025-05-13 PROBLEM — M25.69 DECREASED RANGE OF MOTION OF TRUNK AND BACK: Status: ACTIVE | Noted: 2025-05-13

## 2025-05-13 NOTE — PROGRESS NOTES
Outpatient Rehab    Physical Therapy Visit    Patient Name: Naima Jeffries  MRN: [id number]  YOB: 1970  Encounter Date: 5/14/2025    Therapy Diagnosis:   Encounter Diagnoses   Name Primary?    Weakness of right hip Yes    Decreased range of motion of trunk and back     Weakness of right shoulder      Physician: Kar Trevino III, *    Physician Orders: Eval and Treat  Medical Diagnosis: Primary osteoarthritis of right hip  Right shoulder pain, unspecified chronicity    Visit # / Visits Authorized:  1 / 10  Insurance Authorization Period: 5/5/2025 to 12/31/2025    Date of Evaluation: 5/5/2025  Plan of Care Certification: 5/5/2025 to 6/30/2025      PT/PTA:     Number of PTA visits since last PT visit:   Time In: 0910   Time Out: 0955  Total Time (in minutes): 45   Total Billable Time (in minutes): 45 minutes     FOTO:  Intake Score: 40%  Survey Score 2:  %  Survey Score 3:  %    Precautions:     Subjective   Patient reports Right hip pain is about the same. She has an appointment with MD tomorrow to be seen for thoracic pain..  Pain reported as 6/10.      Objective          Treatment:     Therapeutic Exercise for 35 minutes:    Bridges with green TB 3 sec hold 3 x 8  Sidelying clamshells green TB 3 sec hold 3 x 8 bilateral  Sidelying hip abduction 3 sec hold 2 x 10 bilateral   Sit to stands holding 10 lb DB 3 x 8  Shuttle leg press bilateral LE 87 lbs (3 blk, 1 red) 3 x 8  Shuttle leg press single LE 37 lbs (1 blk, 1 red) 2 x 10 each side   Lateral walks green TB below knees 5 yards x 4 laps     Add thoracic therex next visit     Manual Therapy for 10 minutes:    Right LE long axis distraction grade III-IV  Right hip inferior and lateral glides with mobilization belt, grade III-IV  Right hip prone P/A glides, grade III-IV    Time Entry(in minutes):  Manual Therapy Time Entry: 10  Therapeutic Exercise Time Entry: 35    Assessment & Plan   Assessment:       Patient tolerated first follow up treatment  session well and completed all exercises without c/o increased hip symptoms. She responded well to Right hip joint mobs to address hip stiffness. Therex focused on lateral and posterior hip strengthening. Will progress treatment as tolerated.     Patient will continue to benefit from skilled outpatient physical therapy to address the deficits listed in the problem list box on initial evaluation, provide pt/family education and to maximize pt's level of independence in the home and community environment.     Patient's spiritual, cultural, and educational needs considered and patient agreeable to plan of care and goals.         Plan:  Continue per established PT plan of care.     Goals:   Active       Long Term Goals       Long Term Goals       Start:  05/13/25    Expected End:  06/30/25       1.     Patient will demonstrate bilateral cervical rotation AROM at least 65 degrees.   2.     Patient will demonstrate Right hip strength 5/5.  3.     Pain Rating at Worst: 2/10 or less to improve overall Quality of Life.    4.     Patient will meet predicted functional outcome (FOTO) score: 10% improvement in functional ability or greater to increase self-perceived functional ability.  5.     Patient will be independent with updated HEP at discharge.               Short Term Goals       Short Term Goals       Start:  05/13/25    Expected End:  05/30/25       1.     Patient will be independent with HEP to supplement therapy in return to maximal function.  2.     Pain rating at Worst: 5/10 or less for improved tolerance with walking.   3.     Patient will demonstrate Right hip strength 4+/5.                Mirela Blunt, PT

## 2025-05-14 ENCOUNTER — CLINICAL SUPPORT (OUTPATIENT)
Dept: REHABILITATION | Facility: HOSPITAL | Age: 55
End: 2025-05-14
Payer: COMMERCIAL

## 2025-05-14 ENCOUNTER — TELEPHONE (OUTPATIENT)
Dept: ORTHOPEDICS | Facility: CLINIC | Age: 55
End: 2025-05-14
Payer: COMMERCIAL

## 2025-05-14 DIAGNOSIS — M25.69 DECREASED RANGE OF MOTION OF TRUNK AND BACK: ICD-10-CM

## 2025-05-14 DIAGNOSIS — R29.898 WEAKNESS OF RIGHT HIP: Primary | ICD-10-CM

## 2025-05-14 DIAGNOSIS — R29.898 WEAKNESS OF RIGHT SHOULDER: ICD-10-CM

## 2025-05-14 PROCEDURE — 97140 MANUAL THERAPY 1/> REGIONS: CPT

## 2025-05-14 PROCEDURE — 97110 THERAPEUTIC EXERCISES: CPT

## 2025-05-14 NOTE — TELEPHONE ENCOUNTER
Called patient to inform them to arrive 15 mins early to complete x-rays prior to appointment on 5/15. ALFONSO.    Priscila Dillard MS, OTC  Clinical Assistant to Dr. Eliane Edgar

## 2025-05-15 ENCOUNTER — HOSPITAL ENCOUNTER (OUTPATIENT)
Dept: RADIOLOGY | Facility: HOSPITAL | Age: 55
Discharge: HOME | End: 2025-05-15
Attending: STUDENT IN AN ORGANIZED HEALTH CARE EDUCATION/TRAINING PROGRAM
Payer: COMMERCIAL

## 2025-05-15 ENCOUNTER — OFFICE VISIT (OUTPATIENT)
Dept: SPORTS MEDICINE | Facility: CLINIC | Age: 55
End: 2025-05-15
Payer: COMMERCIAL

## 2025-05-15 VITALS
SYSTOLIC BLOOD PRESSURE: 155 MMHG | HEART RATE: 76 BPM | DIASTOLIC BLOOD PRESSURE: 78 MMHG | HEIGHT: 67 IN | BODY MASS INDEX: 25.73 KG/M2 | WEIGHT: 163.94 LBS

## 2025-05-15 DIAGNOSIS — M25.511 CHRONIC RIGHT SHOULDER PAIN: Primary | ICD-10-CM

## 2025-05-15 DIAGNOSIS — M25.511 RIGHT SHOULDER PAIN, UNSPECIFIED CHRONICITY: ICD-10-CM

## 2025-05-15 DIAGNOSIS — G25.89 SCAPULAR DYSKINESIS: ICD-10-CM

## 2025-05-15 DIAGNOSIS — M75.51 SCAPULOTHORACIC BURSITIS OF RIGHT SHOULDER: ICD-10-CM

## 2025-05-15 DIAGNOSIS — G89.29 CHRONIC RIGHT SHOULDER PAIN: Primary | ICD-10-CM

## 2025-05-15 PROCEDURE — 3008F BODY MASS INDEX DOCD: CPT | Mod: CPTII,S$GLB,, | Performed by: STUDENT IN AN ORGANIZED HEALTH CARE EDUCATION/TRAINING PROGRAM

## 2025-05-15 PROCEDURE — 99204 OFFICE O/P NEW MOD 45 MIN: CPT | Mod: S$GLB,,, | Performed by: STUDENT IN AN ORGANIZED HEALTH CARE EDUCATION/TRAINING PROGRAM

## 2025-05-15 PROCEDURE — 3044F HG A1C LEVEL LT 7.0%: CPT | Mod: CPTII,S$GLB,, | Performed by: STUDENT IN AN ORGANIZED HEALTH CARE EDUCATION/TRAINING PROGRAM

## 2025-05-15 PROCEDURE — 3078F DIAST BP <80 MM HG: CPT | Mod: CPTII,S$GLB,, | Performed by: STUDENT IN AN ORGANIZED HEALTH CARE EDUCATION/TRAINING PROGRAM

## 2025-05-15 PROCEDURE — 73030 X-RAY EXAM OF SHOULDER: CPT | Mod: 26,RT,, | Performed by: RADIOLOGY

## 2025-05-15 PROCEDURE — 1159F MED LIST DOCD IN RCRD: CPT | Mod: CPTII,S$GLB,, | Performed by: STUDENT IN AN ORGANIZED HEALTH CARE EDUCATION/TRAINING PROGRAM

## 2025-05-15 PROCEDURE — 99999 PR PBB SHADOW E&M-EST. PATIENT-LVL IV: CPT | Mod: PBBFAC,,, | Performed by: STUDENT IN AN ORGANIZED HEALTH CARE EDUCATION/TRAINING PROGRAM

## 2025-05-15 PROCEDURE — 73030 X-RAY EXAM OF SHOULDER: CPT | Mod: TC,RT

## 2025-05-15 PROCEDURE — 3077F SYST BP >= 140 MM HG: CPT | Mod: CPTII,S$GLB,, | Performed by: STUDENT IN AN ORGANIZED HEALTH CARE EDUCATION/TRAINING PROGRAM

## 2025-05-15 PROCEDURE — 1160F RVW MEDS BY RX/DR IN RCRD: CPT | Mod: CPTII,S$GLB,, | Performed by: STUDENT IN AN ORGANIZED HEALTH CARE EDUCATION/TRAINING PROGRAM

## 2025-05-15 NOTE — PROGRESS NOTES
CC: right shoulder pain    54 y.o. Female presents today for evaluation of her right shoulder pain. Pt reports gradual onset of shoulder pain since mid-March '25. Pt localizes pain to posterior shoulder at rhomboid area. Pt reports pain is 5-6/10 today, and states pain is worse at night (wakes her up). Pt also notes pain with looking over right shoulder. Pt denies mechanical symptoms. Pt reports prev hx of tingling in right hand at night while sleeping that would resolve with putting hand below heart level. Pt reports this is gradually starting to occur in left hand recently.    Hand dominance: Right     SYMPTOMS:   Pain Score: 5-6/10  Pain location: posterior (near rhomboid area)  Time of onset: mid-March '25  Trauma, injury: gradual onset    Nocturnal pain: yes  Weakness: no  Clicking, catching: no  Neck problems: no     INTERVENTIONS:   Medications tried: advil - total of 600mg per day (unsure of relief)  Physical therapy: none  Injections: none    RELEVANT HISTORY:   Imaging to date: 5/15/25  Previous significant shoulder/arm injuries: none  Previous shoulder surgeries: none    Occupation: desk work     REVIEW OF SYSTEMS:   Constitution: Patient denies fever or chills.  Eyes: Patient denies eye pain or vision changes.  HEENT: Patient denies ear pain, sore throat, or nasal discharge.  CVS: Patient denies chest pain.  Lungs: Patient denies shortness of breath or cough.  Abdomen: Patient denies any stomach pain, nausea, vomiting, or diarrhea  Skin: Patient denies skin rash or itching.    Musculoskeletal: Patient reports right hip pain.   Neuro: Patient denies any numbness or tingling in lower extremities.  Psych: Patient denies any current anxiety or nervousness.    PAST MEDICAL HISTORY:   Past Medical History:   Diagnosis Date    Abnormal Pap smear of cervix 1989    Anxiety     Depression     Fever blister     S/P D&C (status post dilation and curettage)        PAST SURGICAL HISTORY:  Past Surgical History:  "  Procedure Laterality Date    BREAST BIOPSY Right 2018    benign    COLONOSCOPY N/A 07/28/2022    Procedure: COLONOSCOPY;  Surgeon: Akua Haider MD;  Location: [facility] ENDO (4TH FLR);  Service: Endoscopy;  Laterality: N/A;  original order from Dr Florian on 11/5/21-unusable. new order placed        vaccinated-GT    COLONOSCOPY N/A 11/09/2022    Procedure: COLONOSCOPY;  Surgeon: Gael Rico MD;  Location: [facility] ENDO (4TH FLR);  Service: Endoscopy;  Laterality: N/A;  old case request unusable, new case request placed  fully vaccinated, instructions emailed to [email or web address]  golytely. pre call complete A.s    EXCISION OF PTERYGIUM Right     LASER ABLATION OF THE CERVIX  1989       FAMILY HISTORY:  Family History   Problem Relation Name Age of Onset    Stroke Mother      Breast cancer Paternal Aunt      Breast cancer Cousin paternal     Colon cancer Neg Hx      Ovarian cancer Neg Hx      Uterine cancer Neg Hx      Cervical cancer Neg Hx      Cancer Neg Hx         SOCIAL HISTORY:  Social History[1]    MEDICATIONS:   Current Medications[2]    ALLERGIES:   Review of patient's allergies indicates:  No Known Allergies     PHYSICAL EXAMINATION:  BP (!) 155/78   Pulse 76   Ht 5' 7" (1.702 m)   Wt 74.3 kg (163 lb 14.6 oz)   BMI 25.67 kg/m²   Vitals signs and nursing note have been reviewed.    General: In no acute distress, well developed, well nourished, no diaphoresis  Eyes: EOM full and smooth, no eye redness or discharge  HEENT: normocephalic and atraumatic, neck supple, trachea midline, no nasal discharge  Cardiovascular: no LE edema  Lungs: respirations non-labored, no conversational dyspnea   Neuro: AAOx3, CN2-12 grossly intact  Skin: No rashes, warm and dry  Psychiatric: cooperative, pleasant, mood and affect appropriate for age    Right Shoulder:  INSPECTION / PALPATION:  -Deformity   -Ecchymosis   -Atrophy   -Sulcus sign   -No TTP over anatomy including clavicle, scapular spine, ACJ, " acromion, supraspinatus, infraspinatus, bicipital groove   +scapular dyskinesis     ROM:    Flex to 200° vs 200° contra   Abduct to 190° vs 190° contra   Int rot to T7 vs T7 contra   Ext rot to 90° vs 90° contra  -Scapular dyskinesis     STRENGTH:   Scaption 5/5   Flexion 5/5   Ext rot 5/5   Int rot 5/5    OTHER:   -Painful arc   -Drop arm   -Int lag  -Ext lag  -Neer's   -Fraga-Ismael   -Massac active compression   -Empty Can  -Full Can  -Speed's   -Apprehension    NECK:   Grossly FROM & painless in neck flex, ext, B/L torsion, B/L lat flexion   -Spurling B/L    Hands NVI B/L    IMAGIN. Shoulder X-ray ordered due to right shoulder pain. 3 views taken today.   2. X-ray images were reviewed personally by me and then directly with patient.  3. FINDINGS:  Normal bony alignment, no signs of acute fracture or dislocation, soft tissues unremarkable.    4. IMPRESSION:   No acute osseous abnormalities appreciated.    ASSESSMENT:      ICD-10-CM ICD-9-CM   1. Right shoulder pain, unspecified chronicity  M25.511 719.41         PLAN:  Based on patient history, physical exam findings, imaging my working diagnosis is scapular dyskinesis and scapular mobility issues likely leading  to scapulothoracic bursitis.  At this time I am not concerned for internal derangement to the patient's shoulders.  Patient will be sent to formal physical therapy.  Follow up p.r.n..    All questions were answered to the best of my ability and all concerns were addressed at this time.    Follow up p.r.n.    This note is dictated using the M*Modal Fluency Direct word recognition program. There are word recognition mistakes that are occasionally missed on review.               [1]   Social History  Socioeconomic History    Marital status: Single   Tobacco Use    Smoking status: Never     Passive exposure: Never    Smokeless tobacco: Never   Substance and Sexual Activity    Alcohol use: Yes     Comment: socially    Drug use: Never    Sexual  activity: Yes     Partners: Male     Birth control/protection: None     Social Drivers of Health     Financial Resource Strain: Low Risk  (4/13/2024)    Overall Financial Resource Strain (CARDIA)     Difficulty of Paying Living Expenses: Not hard at all   Food Insecurity: No Food Insecurity (4/13/2024)    Hunger Vital Sign     Worried About Running Out of Food in the Last Year: Never true     Ran Out of Food in the Last Year: Never true   Transportation Needs: No Transportation Needs (4/13/2024)    PRAPARE - Transportation     Lack of Transportation (Medical): No     Lack of Transportation (Non-Medical): No   Physical Activity: Insufficiently Active (4/13/2024)    Exercise Vital Sign     Days of Exercise per Week: 3 days     Minutes of Exercise per Session: 40 min   Stress: Stress Concern Present (4/13/2024)    [place] citizen [place] [employer] - Occupational Stress Questionnaire     Feeling of Stress : To some extent   Housing Stability: Unknown (4/13/2024)    Housing Stability Vital Sign     Unable to Pay for Housing in the Last Year: No   [2]   Current Outpatient Medications:     EScitalopram oxalate (LEXAPRO) 10 MG tablet, Take 1 tablet (10 mg total) by mouth once daily., Disp: 90 tablet, Rfl: 3    estradioL (VIVELLE-DOT) 0.025 mg/24 hr, Place 1 patch onto the skin twice a week., Disp: 8 patch, Rfl: 11    meloxicam (MOBIC) 15 MG tablet, Take 1 tablet (15 mg total) by mouth once daily. Additional refills should come from primary care provider, Disp: 30 tablet, Rfl: 2    predniSONE (DELTASONE) 20 MG tablet, Take 1 tablet (20 mg total) by mouth once daily., Disp: 5 tablet, Rfl: 0    progesterone (PROMETRIUM) 100 MG capsule, Take 1 capsule (100 mg total) by mouth nightly., Disp: 30 capsule, Rfl: 11    UNABLE TO FIND, medication name: Testosterone 1% Cream Apply 1 click to the upper inner thigh daily, Disp: 30 g, Rfl: 5

## 2025-05-22 ENCOUNTER — CLINICAL SUPPORT (OUTPATIENT)
Dept: REHABILITATION | Facility: HOSPITAL | Age: 55
End: 2025-05-22
Payer: COMMERCIAL

## 2025-05-22 DIAGNOSIS — M25.69 DECREASED RANGE OF MOTION OF TRUNK AND BACK: ICD-10-CM

## 2025-05-22 DIAGNOSIS — M75.51 SCAPULOTHORACIC BURSITIS OF RIGHT SHOULDER: ICD-10-CM

## 2025-05-22 DIAGNOSIS — G89.29 CHRONIC RIGHT SHOULDER PAIN: ICD-10-CM

## 2025-05-22 DIAGNOSIS — M25.511 CHRONIC RIGHT SHOULDER PAIN: ICD-10-CM

## 2025-05-22 DIAGNOSIS — R29.898 WEAKNESS OF RIGHT HIP: Primary | ICD-10-CM

## 2025-05-22 DIAGNOSIS — R29.898 WEAKNESS OF RIGHT SHOULDER: ICD-10-CM

## 2025-05-22 DIAGNOSIS — G25.89 SCAPULAR DYSKINESIS: ICD-10-CM

## 2025-05-22 PROCEDURE — 97140 MANUAL THERAPY 1/> REGIONS: CPT

## 2025-05-22 PROCEDURE — 97110 THERAPEUTIC EXERCISES: CPT

## 2025-05-22 NOTE — PROGRESS NOTES
Outpatient Rehab    Physical Therapy Visit    Patient Name: Naima Jeffries  MRN: [id number]  YOB: 1970  Encounter Date: 5/22/2025    Therapy Diagnosis:   Encounter Diagnoses   Name Primary?    Chronic right shoulder pain     Scapular dyskinesis     Scapulothoracic bursitis of right shoulder     Weakness of right hip Yes    Decreased range of motion of trunk and back     Weakness of right shoulder      Physician: Kar Trevino III, *    Physician Orders: Eval and Treat  Medical Diagnosis: Primary osteoarthritis of right hip  Right shoulder pain, unspecified chronicity  Chronic right shoulder pain  Scapular dyskinesis  Scapulothoracic bursitis of right shoulder    Visit # / Visits Authorized:  2 / 10  Insurance Authorization Period: 5/5/2025 to 12/31/2025    Date of Evaluation: 5/5/2025  Plan of Care Certification: 5/5/2025 to 6/30/2025      PT/PTA:     Number of PTA visits since last PT visit:   Time In: 0905   Time Out: 0955  Total Time (in minutes): 50   Total Billable Time (in minutes): 50 minutes     FOTO:  Intake Score:  %  Survey Score 2:  %  Survey Score 3:  %    Precautions:     Subjective   Patient reports continued Right hip pain and Right upper back pain.  Pain reported as 4/10.      Objective          Treatment:     Therapeutic Exercise for 40 minutes:    Bridges with green TB 3 sec hold 3 x 8  Sidelying clamshells green TB 3 sec hold 3 x 8 bilateral  Sidelying hip abduction 3 sec hold 2 x 10 bilateral   Sit to stands holding 10 lb DB 3 x 8 - NP  Shuttle leg press single LE 37 lbs (1 blk, 1 red) 3 x 8 each side   Lateral walks green TB below knees 5 yards x 4 laps   Wall slide + lift off yellow TB 3 x 8  Prone mid trap level 2, 3 x 8   90/90 ER walkout green TB 1 x 10    Not performed:  Shuttle leg press bilateral LE 87 lbs (3 blk, 1 red) 3 x 8    Manual Therapy for 10 minutes:    Right LE long axis distraction grade III-IV  Right hip inferior and lateral glides with mobilization belt,  grade III-IV  Right hip prone P/A glides, grade III-IV    Time Entry(in minutes):       Assessment & Plan   Assessment:       Patient continues to respond well to Right hip joint mobilizations with decreased hip pain reported following. She continues to c/o Right upper thoracic pain. Continued with core stabilization, hip strengthening and also added periscapular strengthening today to address thoracic pain. Will progress treatment as tolerated.     Patient will continue to benefit from skilled outpatient physical therapy to address the deficits listed in the problem list box on initial evaluation, provide pt/family education and to maximize pt's level of independence in the home and community environment.     Patient's spiritual, cultural, and educational needs considered and patient agreeable to plan of care and goals.         Plan:  Continue per established PT plan of care.     Goals:   Active       Long Term Goals       Long Term Goals       Start:  05/13/25    Expected End:  06/30/25       1.     Patient will demonstrate bilateral cervical rotation AROM at least 65 degrees.   2.     Patient will demonstrate Right hip strength 5/5.  3.     Pain Rating at Worst: 2/10 or less to improve overall Quality of Life.    4.     Patient will meet predicted functional outcome (FOTO) score: 10% improvement in functional ability or greater to increase self-perceived functional ability.  5.     Patient will be independent with updated HEP at discharge.               Short Term Goals       Short Term Goals       Start:  05/13/25    Expected End:  05/30/25       1.     Patient will be independent with HEP to supplement therapy in return to maximal function.  2.     Pain rating at Worst: 5/10 or less for improved tolerance with walking.   3.     Patient will demonstrate Right hip strength 4+/5.                  Mirela Blunt, PT

## 2025-05-27 ENCOUNTER — OFFICE VISIT (OUTPATIENT)
Dept: ORTHOPEDICS | Facility: CLINIC | Age: 55
End: 2025-05-27
Payer: COMMERCIAL

## 2025-05-27 VITALS — HEIGHT: 67 IN | WEIGHT: 166.13 LBS | BODY MASS INDEX: 26.08 KG/M2

## 2025-05-27 DIAGNOSIS — M16.11 PRIMARY OSTEOARTHRITIS OF RIGHT HIP: Primary | ICD-10-CM

## 2025-05-27 PROCEDURE — 3044F HG A1C LEVEL LT 7.0%: CPT | Mod: CPTII,S$GLB,, | Performed by: ORTHOPAEDIC SURGERY

## 2025-05-27 PROCEDURE — 99999 PR PBB SHADOW E&M-EST. PATIENT-LVL II: CPT | Mod: PBBFAC,,, | Performed by: ORTHOPAEDIC SURGERY

## 2025-05-27 PROCEDURE — 99213 OFFICE O/P EST LOW 20 MIN: CPT | Mod: S$GLB,,, | Performed by: ORTHOPAEDIC SURGERY

## 2025-05-27 PROCEDURE — 3008F BODY MASS INDEX DOCD: CPT | Mod: CPTII,S$GLB,, | Performed by: ORTHOPAEDIC SURGERY

## 2025-05-27 NOTE — PROGRESS NOTES
Subjective:     HPI:   Naima Jeffries is a 54 y.o. female who presents for repeat eval R hip     Same or worse   [facility]     See note 4/26/25        History of Present Illness    CHIEF COMPLAINT:  - Right hip pain and potential hip replacement.    HPI:  Ms. Jeffries presents for follow-up of hip pain and shoulder issues. She reports ongoing right hip pain that significantly impacts mobility, worsening with walking. She also notes left hip discomfort. She has been trying to strengthen the area around her hips through workouts like Pilates. Her motivation stems from witnessing her mother's stroke and subsequent immobility, expressing a strong desire to take all possible measures to maintain her strength and mobility.    She mentions a previous diagnosis of scapular dyskinesis and scapular mobility issues affecting the scapulothoracic area. This condition involves the muscles attaching between the ribcage and shoulder blade, potentially causing tendinitis and soft tissue irritation. The recommended treatment has been PT to work on these muscles, retrain them, and improve posture.    She is considering hip replacement for her right hip, with concerns about the recovery process, particularly in relation to caring for her children (aged 10 and 13) who will be returning from [place] at the end of July or beginning of August. She also mentions her boyfriend's recent prostate cancer diagnosis, which may affect his ability to assist post-surgery.    She denies any recent trauma or injury to the hip area. She denies any history of previous hip surgeries or replacements.    PREVIOUS TREATMENTS:  - PT for scapular dyskinesis and scapular mobility issues: Involves working out and retraining the muscles that attach between the ribcage and shoulder blade, as well as working on posture.  - Pilates and other workouts to strengthen the area around the hip.    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 Jeffries can potentially work from home on a laptop after surgery    SOCIAL HISTORY:  - Marital Status: Ms. Jeffries has a boyfriend  - Children: Two children, aged 10 and 13            Objective:   Body mass index is 26.02 kg/m².  Exam:    Physical Exam    Musculoskeletal: No significant issues with left leg raise. Mild discomfort with right leg raise.  IMAGING:  - XR Hip: Findings show cartilage is worn out in the hip joint.         0 LLD   Mild discomfort with extra straight leg raise. Zero to 100 hip flexion, 30 abduction, 20 adduction, 50 external, 20 internal rotation. Pain with flexion, adduction, and internal rotation.     Imaging:    Results              HIP R ARTHRITIS        Indication:  Right hip pain  Exam Ordered: Radiographs include an anteroposterior pelvis, an anteroposterior and lateral view of the proximal femur including the hip joint.  Details of Examination: Exam shows evidence of joint space narrowing, osteophyte formation, and subchondral sclerosis, all consistent with degenerative arthritis of the hip.  No other significant findings are noted.  Impression:  Degenerative Arthritis, Right Hip     R hip Tg3 R hip OA bone on bone, sig progression since 2023      Assessment/Plan:       ICD-10-CM ICD-9-CM   1. Primary osteoarthritis of right hip  M16.11 715.15        No sig PMHx    Assessment/Plan:     Assessment & Plan    RIGHT HIP OSTEOARTHRITIS / HIP REPLACEMENT:   Discussed hip replacement using anterior approach with bikini line incision.   Outlined surgical process, including spinal anesthesia and IV sedation.   Discussed risks: infection, bleeding, nerve damage, potential bone fracture, and blood clots.   Explained post-op care and recovery timeline.   Ms. Jeffries expressed interest but has not definitively agreed to the procedure.   Tentatively scheduled for the 17th, pending final decision and logistical considerations.    POST-OPERATIVE CARE AND RECOVERY:   Use  walker for first 1-2 weeks after surgery until cleared by therapy.   Perform home exercises as instructed after initial 2 weeks of home therapy.   Avoid driving for 3-4 weeks after surgery on right side; resume when off narcotics and strength/response time returns.   Be cautious with skin to avoid cuts, scrapes, or open wounds.   Take 81mg aspirin twice daily for 30 days after surgery.    FOLLOW-UP:   Follow up with pre-operative visit with Mikal Del Rio or Tamir (NP/PA team) for paperwork and surgical planning.         The above findings were discussed with patient length. We discussed the risks of conservative versus surgical management of the patients hip arthritis. Conservative management consisting of anti-inflammatory medications, weight loss, physical therapy, and activity modification was discussed at length. At this point considering the patient's level of activity, pain, and radiographic findings I recommend DEANDRE    This patient has significant symptoms in their hip that are affecting their quality of life and daily activities.  They have tried non-operative treatment including analgesics, an exercise program, and activity modification, but the symptoms have persisted. I believe they make a good candidate for hip arthroplasty.     The risks and benefits of hip arthroplasty have been discussed with the patient which include, but are not limited to infections (including severe sequelae), potential component failure, fracture, DVT, pulmonary embolus, nerve palsy, dislocation, leg length inequality, persistent pain, wound healing complications, transfusions, medical complications and death.     We discussed surgical options including implant type and why I believe the implant selected is a good match for the patient's needs. The patient expressed understanding and agrees to proceed with the planned surgery.     Pre-operative planning will include the following:  A pre-surgical evaluation will be  arranged.  Pre-op orders will be placed.  We will make arrangements with the operating room for proper time and staffing.  We will make Social Service arrangements for the patient.    Approach: Anterior    Implants:   Company: [employer]  Cup:  [employer]      Stem: Actis    DVT prophylaxis: ASA 81mg BID x1 month    Dispo: home health PT    Admission status: Outpatient/23hr OBS    [facility]: [facility] DEANDRE - bikini   R+B  1 night, limited help  Ok to lengthen     DEANDRE info    ?timing variables:  Her 10 and 12 yo kids are in [place] all of June and July   Boyfriend just diagnosed with prostate cancer - waiting on his treatment plan   Dad is in her 70's   Also several friends             No orders of the defined types were placed in this encounter.      This note was generated with the assistance of ambient listening technology. Verbal consent was obtained by the patient and accompanying visitor(s) for the recording of patient appointment to facilitate this note. I attest to having reviewed and edited the generated note for accuracy, though some syntax or spelling errors may persist. Please contact the author of this note for any clarification.            Past Medical History:   Diagnosis Date    Abnormal Pap smear of cervix 1989    Anxiety     Depression     Fever blister     S/P D&C (status post dilation and curettage)        Past Surgical History:   Procedure Laterality Date    BREAST BIOPSY Right 2018    benign    COLONOSCOPY N/A 07/28/2022    Procedure: COLONOSCOPY;  Surgeon: Akua Haider MD;  Location: [facility] ([address] [place] [address]);  Service: Endoscopy;  Laterality: N/A;  original order from Dr Florian on 11/5/21-unusable. new order placed        vaccinated-GT    COLONOSCOPY N/A 11/09/2022    Procedure: COLONOSCOPY;  Surgeon: Geal Rico MD;  Location: [facility] ([address] [place] [address]);  Service: Endoscopy;  Laterality: N/A;  old case request unusable, new case request placed  fully vaccinated, instructions emailed to  [email or web address]  mali. pre call complete A.s    EXCISION OF PTERYGIUM Right     LASER ABLATION OF THE CERVIX  1989       Family History   Problem Relation Name Age of Onset    Stroke Mother      Breast cancer Paternal Aunt      Breast cancer Cousin paternal     Colon cancer Neg Hx      Ovarian cancer Neg Hx      Uterine cancer Neg Hx      Cervical cancer Neg Hx      Cancer Neg Hx         Social History     Socioeconomic History    Marital status: Single   Tobacco Use    Smoking status: Never     Passive exposure: Never    Smokeless tobacco: Never   Substance and Sexual Activity    Alcohol use: Yes     Comment: socially    Drug use: Never    Sexual activity: Yes     Partners: Male     Birth control/protection: None     Social Drivers of Health     Financial Resource Strain: Low Risk  (4/13/2024)    Overall Financial Resource Strain (CARDIA)     Difficulty of Paying Living Expenses: Not hard at all   Food Insecurity: No Food Insecurity (4/13/2024)    Hunger Vital Sign     Worried About Running Out of Food in the Last Year: Never true     Ran Out of Food in the Last Year: Never true   Transportation Needs: No Transportation Needs (4/13/2024)    PRAPARE - Transportation     Lack of Transportation (Medical): No     Lack of Transportation (Non-Medical): No   Physical Activity: Insufficiently Active (4/13/2024)    Exercise Vital Sign     Days of Exercise per Week: 3 days     Minutes of Exercise per Session: 40 min   Stress: Stress Concern Present (4/13/2024)    [employer] - Occupational Stress Questionnaire     Feeling of Stress : To some extent   Housing Stability: Unknown (4/13/2024)    Housing Stability Vital Sign     Unable to Pay for Housing in the Last Year: No

## 2025-05-28 ENCOUNTER — PATIENT MESSAGE (OUTPATIENT)
Dept: ORTHOPEDICS | Facility: CLINIC | Age: 55
End: 2025-05-28
Payer: COMMERCIAL

## 2025-05-28 ENCOUNTER — CLINICAL SUPPORT (OUTPATIENT)
Dept: REHABILITATION | Facility: HOSPITAL | Age: 55
End: 2025-05-28
Payer: COMMERCIAL

## 2025-05-28 DIAGNOSIS — R29.898 WEAKNESS OF RIGHT HIP: Primary | ICD-10-CM

## 2025-05-28 DIAGNOSIS — M25.69 DECREASED RANGE OF MOTION OF TRUNK AND BACK: ICD-10-CM

## 2025-05-28 DIAGNOSIS — R29.898 WEAKNESS OF RIGHT SHOULDER: ICD-10-CM

## 2025-05-28 PROCEDURE — 97110 THERAPEUTIC EXERCISES: CPT

## 2025-05-28 PROCEDURE — 97140 MANUAL THERAPY 1/> REGIONS: CPT

## 2025-05-28 NOTE — PROGRESS NOTES
Outpatient Rehab    Physical Therapy Visit    Patient Name: Naima Jeffries  MRN: [id number]  YOB: 1970  Encounter Date: 5/28/2025    Therapy Diagnosis:   Encounter Diagnoses   Name Primary?    Weakness of right hip Yes    Decreased range of motion of trunk and back     Weakness of right shoulder      Physician: Kar Trevino III, *    Physician Orders: Eval and Treat  Medical Diagnosis: Primary osteoarthritis of right hip  Right shoulder pain, unspecified chronicity    Visit # / Visits Authorized:  3 / 10  Insurance Authorization Period: 5/5/2025 to 12/31/2025    Date of Evaluation: 5/5/2025  Plan of Care Certification: 5/5/2025 to 6/30/2025      PT/PTA:     Number of PTA visits since last PT visit:   Time In: 0910   Time Out: 1000  Total Time (in minutes): 50   Total Billable Time (in minutes): 50    FOTO:  Intake Score:  %  Survey Score 2:  %  Survey Score 3:  %    Precautions:     Subjective   Patient reports her Right hip is hurting more today. She also feels some Left hip discomfort..  Pain reported as 7/10.      Objective          Treatment:     Therapeutic Exercise for 40 minutes:    Bridges with green TB 3 sec hold 3 x 10  Sidelying clamshells green TB 3 sec hold 3 x 10 bilateral  Sidelying hip abduction 3 sec hold 3 X 8 bilateral   Sit to stands holding 15 lb kettlebell 3 x 8   Shuttle leg press single LE 37 lbs (1 blk, 1 red) 3 x 8 each side   Lateral walks green TB below knees 10 yards x 4 laps   +Seated leg curl machine 40 lbs 3 x 10  +Hip hinge with dowel 2 x 10  +RDLs with 15 lb dumbbell 3 x 8    Not performed:  Wall slide + lift off yellow TB 3 x 8  Prone mid trap level 2, 3 x 8   90/90 ER walkout green TB 1 x 10  Shuttle leg press bilateral LE 87 lbs (3 blk, 1 red) 3 x 8    Manual Therapy for 10 minutes:    Bilateral LE long axis distraction grade III-IV  Bilateral hip inferior and lateral glides with mobilization belt, grade III-IV  Right hip prone P/A glides, grade III-IV -  NP    Time Entry(in minutes):  Manual Therapy Time Entry: 10  Therapeutic Exercise Time Entry: 40    Assessment & Plan   Assessment:       Patient arrived with increased Right hip pain today, therefore treatment focused on addressing hip symptoms. Continued with Right hip joint mobs and PROM which she tolerated well. Several progressions made in therex today with RDLs and seated leg curl machine added. Progressed resistance for sit to stands. Will continue to progress treatment as tolerated.     Patient will continue to benefit from skilled outpatient physical therapy to address the deficits listed in the problem list box on initial evaluation, provide pt/family education and to maximize pt's level of independence in the home and community environment.     Patient's spiritual, cultural, and educational needs considered and patient agreeable to plan of care and goals.       Plan:  Continue per established PT plan of care.     Goals:   Active       Long Term Goals       Long Term Goals       Start:  05/13/25    Expected End:  06/30/25       1.     Patient will demonstrate bilateral cervical rotation AROM at least 65 degrees.   2.     Patient will demonstrate Right hip strength 5/5.  3.     Pain Rating at Worst: 2/10 or less to improve overall Quality of Life.    4.     Patient will meet predicted functional outcome (FOTO) score: 10% improvement in functional ability or greater to increase self-perceived functional ability.  5.     Patient will be independent with updated HEP at discharge.               Short Term Goals       Short Term Goals       Start:  05/13/25    Expected End:  05/30/25       1.     Patient will be independent with HEP to supplement therapy in return to maximal function.  2.     Pain rating at Worst: 5/10 or less for improved tolerance with walking.   3.     Patient will demonstrate Right hip strength 4+/5.                Mirela Blunt, PT

## 2025-05-30 DIAGNOSIS — Z01.818 PRE-OP TESTING: ICD-10-CM

## 2025-05-30 DIAGNOSIS — M79.609 PAIN IN EXTREMITY, UNSPECIFIED EXTREMITY: Primary | ICD-10-CM

## 2025-05-30 NOTE — ANESTHESIA PAT ROS NOTE
05/30/2025  Naima Jeffries is a 54 y.o., female.      Pre-op Assessment          Review of Systems           Anesthesia Assessment: Preoperative EQUATION    Planned Procedure: Procedure(s) (LRB):  ARTHROPLASTY, HIP, TOTAL, ANTERIOR APPROACH: RIGHT: DEPUY - ACTIS + EMPHASYS (Right)  Requested Anesthesia Type:Spinal/Epidural  Surgeon: Kar Trevino III, MD  Service: Orthopedics  Known or anticipated Date of Surgery:7/17/2025    Surgeon notes: reviewed    Electronic QUestionnaire Assessment completed via nurse interview with patient.        Triage considerations:     The patient has no apparent active cardiac condition (No unstable coronary Syndrome such as severe unstable angina or recent [<1 month] myocardial infarction, decompensated CHF, severe valvular   disease or significant arrhythmia)    Previous anesthesia records:GETA and No problems  11/9/22   COLONOSCOPY   Airway:  Mallampati: III / II  Mouth Opening: Normal  TM Distance: Normal  Tongue: Normal  Neck ROM: Normal ROM    Last PCP note: within 3 months , within [facility]   Subspecialty notes: Ortho    Other important co-morbidities: No signficiant history      Tests already available:  Available tests,  within [facility] .   3/14/25 A1C, CMP, CBC  12/3/21 LUMBAR MRI   3/16/23 EKG             Instructions given. (See in Nurse's note)    Optimization:  Anesthesia Preop Clinic Assessment  Indicated POC    Medical Opinion Indicated       Sub-specialist consult indicated:   [facility] NP       Plan:    Testing:  CMP, Hematology Profile, and PT/INR   Pre-anesthesia  visit       Visit focus: possible regional anesthesia and/or nerve block      Consultation:[facility] NP for medical and anesthesia optimization       Patient  has previously scheduled Medical Appointment: 6/24     Navigation: Tests Scheduled.              Consults scheduled.              Results will be tracked by Preop Clinic.    6/24/25 Patient is optimized     Recent vaginal bleeding BRB for the past 3 days  She reports being perimenopausal  Referral placed to GYN for evalaution   Endometrial biopsy negative- OK to proceed with surgery     I spent a total of 40 minutes on the day of the visit.This includes face to face time and non-face to face time preparing to see the patient (eg, review of tests), obtaining and/or reviewing separately obtained history, documenting clinical information in the electronic or other health record, independently interpreting results and communicating results to the patient/family/caregiver, or care coordinator.        Bhavin Amaya NP  Perioperative Medicine  [facility]   Pager [phone]

## 2025-06-04 ENCOUNTER — CLINICAL SUPPORT (OUTPATIENT)
Dept: REHABILITATION | Facility: HOSPITAL | Age: 55
End: 2025-06-04
Payer: COMMERCIAL

## 2025-06-04 DIAGNOSIS — M25.69 DECREASED RANGE OF MOTION OF TRUNK AND BACK: ICD-10-CM

## 2025-06-04 DIAGNOSIS — R29.898 WEAKNESS OF RIGHT SHOULDER: ICD-10-CM

## 2025-06-04 DIAGNOSIS — R29.898 WEAKNESS OF RIGHT HIP: Primary | ICD-10-CM

## 2025-06-04 PROCEDURE — 97110 THERAPEUTIC EXERCISES: CPT

## 2025-06-04 PROCEDURE — 97140 MANUAL THERAPY 1/> REGIONS: CPT

## 2025-06-04 NOTE — PROGRESS NOTES
Outpatient Rehab    Physical Therapy Visit    Patient Name: Naima Jeffries  MRN: [id number]  YOB: 1970  Encounter Date: 6/4/2025    Therapy Diagnosis:   Encounter Diagnoses   Name Primary?    Weakness of right hip Yes    Decreased range of motion of trunk and back     Weakness of right shoulder      Physician: Kar Trevino III, *    Physician Orders: Eval and Treat  Medical Diagnosis: Primary osteoarthritis of right hip  Right shoulder pain, unspecified chronicity    Visit # / Visits Authorized:  4 / 10  Insurance Authorization Period: 5/5/2025 to 12/31/2025    Date of Evaluation: 5/5/2025  Plan of Care Certification: 5/5/2025 to 6/30/2025      PT/PTA:     Number of PTA visits since last PT visit:   Time In: 0910   Time Out: 1000  Total Time (in minutes): 50   Total Billable Time (in minutes): 50    FOTO:  Intake Score:  %  Survey Score 2:  %  Survey Score 3:  %    Precautions:     Subjective   Patient reports continued bilateral hip pain R > L..  Pain reported as 6/10.      Objective          Treatment:     Therapeutic Exercise for 40 minutes:    Single leg bridges 3 sec hold 2 x 10 each side   Sidelying clamshells green TB 3 sec hold 3 x 10 bilateral  Sidelying hip abduction 3 sec hold 3 x 8 bilateral   Sidelying open book x 20  Prone T's 2 x 8  Prone Y's 2 x 8  Sit to stands holding 15 lb kettlebell 3 x 8   Shuttle leg press single LE 37 lbs (1 blk, 1 red) 3 x 8 each side   Lateral walks green TB below knees 10 yards x 4 laps   RDLs with 15 lb dumbbell 3 x 8    Not performed:  Wall slide + lift off yellow TB 3 x 8  Prone mid trap level 2, 3 x 8   90/90 ER walkout green TB 1 x 10  Seated leg curl machine 40 lbs 3 x 10  Hip hinge with dowel 2 x 10    Manual Therapy for 10 minutes:    Bilateral LE long axis distraction grade III-IV  Bilateral hip inferior and lateral glides with mobilization belt, grade III-IV  Right hip prone P/A glides, grade III-IV - NP    Time Entry(in minutes):  Manual Therapy  Time Entry: 10  Therapeutic Exercise Time Entry: 40    Assessment & Plan   Assessment:       Patient continues to report bilateral hip pain R > L. Continued with bilateral hip joint mobs to address stiffness which patient tolerated well. Progressed to single leg bridges and progressed reps for single leg shuttle press. She also continues to report Right sided thoracic pain. Added prone T's and prone Y's today for postural strengthening. Will progress treatment as tolerated.     Patient will continue to benefit from skilled outpatient physical therapy to address the deficits listed in the problem list box on initial evaluation, provide pt/family education and to maximize pt's level of independence in the home and community environment.     Patient's spiritual, cultural, and educational needs considered and patient agreeable to plan of care and goals.       Plan:  Continue per established PT plan of care.     Goals:   Active       Long Term Goals       Long Term Goals       Start:  05/13/25    Expected End:  06/30/25       1.     Patient will demonstrate bilateral cervical rotation AROM at least 65 degrees.   2.     Patient will demonstrate Right hip strength 5/5.  3.     Pain Rating at Worst: 2/10 or less to improve overall Quality of Life.    4.     Patient will meet predicted functional outcome (FOTO) score: 10% improvement in functional ability or greater to increase self-perceived functional ability.  5.     Patient will be independent with updated HEP at discharge.               Short Term Goals       Short Term Goals       Start:  05/13/25    Expected End:  05/30/25       1.     Patient will be independent with HEP to supplement therapy in return to maximal function.  2.     Pain rating at Worst: 5/10 or less for improved tolerance with walking.   3.     Patient will demonstrate Right hip strength 4+/5.                Mirela Blunt, PT

## 2025-06-09 ENCOUNTER — PATIENT MESSAGE (OUTPATIENT)
Dept: OBSTETRICS AND GYNECOLOGY | Facility: CLINIC | Age: 55
End: 2025-06-09
Payer: COMMERCIAL

## 2025-06-10 ENCOUNTER — CLINICAL SUPPORT (OUTPATIENT)
Dept: REHABILITATION | Facility: HOSPITAL | Age: 55
End: 2025-06-10
Payer: COMMERCIAL

## 2025-06-10 DIAGNOSIS — M25.69 DECREASED RANGE OF MOTION OF TRUNK AND BACK: ICD-10-CM

## 2025-06-10 DIAGNOSIS — R29.898 WEAKNESS OF RIGHT HIP: Primary | ICD-10-CM

## 2025-06-10 DIAGNOSIS — R29.898 WEAKNESS OF RIGHT SHOULDER: ICD-10-CM

## 2025-06-10 PROCEDURE — 97110 THERAPEUTIC EXERCISES: CPT

## 2025-06-10 PROCEDURE — 97140 MANUAL THERAPY 1/> REGIONS: CPT

## 2025-06-10 NOTE — PROGRESS NOTES
Outpatient Rehab    Physical Therapy Progress Note    Patient Name: Naima Jeffries  MRN: [id number]  YOB: 1970  Encounter Date: 6/10/2025    Therapy Diagnosis:   Encounter Diagnoses   Name Primary?    Weakness of right hip Yes    Decreased range of motion of trunk and back     Weakness of right shoulder      Physician: Kar Trevino III, *    Physician Orders: Eval and Treat  Medical Diagnosis: Primary osteoarthritis of right hip  Right shoulder pain, unspecified chronicity  Surgical Diagnosis: Not applicable for this Episode   Surgical Date: Not applicable for this Episode    Visit # / Visits Authorized:  5 / 10  Insurance Authorization Period: 5/5/2025 to 12/31/2025  Date of Evaluation: 5/5/2025  Plan of Care Certification: 5/5/2025 to 6/30/2025      PT/PTA:     Number of PTA visits since last PT visit:   Time In: 0920   Time Out: 1000  Total Time (in minutes): 40   Total Billable Time (in minutes): 40    FOTO:  Intake Score:  %  Survey Score 2:  %  Survey Score 3:  %    Precautions:       Subjective   Patient reports her Left hip has been bothering her more which she thinks is due to compensating. She is scheduled for Right DEANDRE on 7/17/25. She will be out of town next week..  Pain reported as 5/10.      Objective      6/10/2025  Lower Extremity Strength  Right LE   Left LE   Goal   Knee extension: 5/5 Knee extension: 5/5 5/5   Knee flexion: 5/5 Knee flexion: 5/5 5/5   Hip flexion: 4/5 Hip flexion: 4/5 5/5   Hip extension:  4+/5 Hip extension: 4+/5 5/5   Hip abduction: 4/5 Hip abduction: 4+/5 5/5   Hip IR: 4+/5 Hip IR: 5/5 5/5   Hip ER: 4/5 Hip ER:  4+/5 5/5        Treatment:     Therapeutic Exercise for 30 minutes:     Single leg bridges 3 sec hold 2 x 10 each side   Sidelying clamshells green TB 3 sec hold 3 x 10 bilateral  Sidelying hip abduction 3 sec hold 3 x 8 bilateral   Sit to stands holding 15 lb kettlebell 3 x 8 NPT  Shuttle leg press single LE 37 lbs (1 blk, 1 red) 3 x 10 each side    Lateral walks green TB below knees 10 yards x 4 laps   RDLs with 20 lb KB 3 x 10     Not performed:  Sidelying open book x 20  Prone T's 2 x 8  Prone Y's 2 x 8  Wall slide + lift off yellow TB 3 x 8  90/90 ER walkout green TB 1 x 10  Hip hinge with dowel 2 x 10     Manual Therapy for 10 minutes:     Bilateral LE long axis distraction grade III-IV  Bilateral hip inferior and lateral glides with mobilization belt, grade III-IV  Right hip prone P/A glides, grade III-IV - NP    Time Entry(in minutes):  Manual Therapy Time Entry: 10  Therapeutic Exercise Time Entry: 30    Assessment & Plan   Assessment:       Re-assessment performed today and patient demonstrates improvement in bilateral hip strength, but still has mild strength deficits remaining. PT session shortened today due to patient arriving 20 minutes late. She continues to tolerate therapeutic exercise well without c/o increased symptoms. Will progress treatment as tolerated.     The patient will continue to benefit from skilled outpatient physical therapy in order to address the deficits listed in the problem list on the initial evaluation, provide patient and family education, and maximize the patients level of independence in the home and community environments.     The patient's spiritual, cultural, and educational needs were considered, and the patient is agreeable to the plan of care and goals.         Plan:  Continue per established PT plan of care.     Goals:   Active       Long Term Goals       Long Term Goals       Start:  05/13/25    Expected End:  06/30/25       1.     Patient will demonstrate bilateral cervical rotation AROM at least 65 degrees.   2.     Patient will demonstrate Right hip strength 5/5.  3.     Pain Rating at Worst: 2/10 or less to improve overall Quality of Life.    4.     Patient will meet predicted functional outcome (FOTO) score: 10% improvement in functional ability or greater to increase self-perceived functional  ability.  5.     Patient will be independent with updated HEP at discharge.               Short Term Goals       Short Term Goals       Start:  05/13/25    Expected End:  05/30/25       1.     Patient will be independent with HEP to supplement therapy in return to maximal function.  2.     Pain rating at Worst: 5/10 or less for improved tolerance with walking.   3.     Patient will demonstrate Right hip strength 4+/5.                Mirela Blunt, PT

## 2025-06-11 ENCOUNTER — LAB VISIT (OUTPATIENT)
Dept: LAB | Facility: HOSPITAL | Age: 55
End: 2025-06-11
Attending: STUDENT IN AN ORGANIZED HEALTH CARE EDUCATION/TRAINING PROGRAM
Payer: COMMERCIAL

## 2025-06-11 DIAGNOSIS — Z01.818 PRE-OP TESTING: ICD-10-CM

## 2025-06-11 DIAGNOSIS — M79.609 PAIN IN EXTREMITY, UNSPECIFIED EXTREMITY: ICD-10-CM

## 2025-06-11 LAB
ABSOLUTE EOSINOPHIL (OHS): 0.18 K/UL
ABSOLUTE MONOCYTE (OHS): 0.54 K/UL (ref 0.3–1)
ABSOLUTE NEUTROPHIL COUNT (OHS): 3.25 K/UL (ref 1.8–7.7)
ALBUMIN SERPL BCP-MCNC: 4.3 G/DL (ref 3.5–5.2)
ALP SERPL-CCNC: 45 UNIT/L (ref 40–150)
ALT SERPL W/O P-5'-P-CCNC: 15 UNIT/L (ref 10–44)
ANION GAP (OHS): 6 MMOL/L (ref 8–16)
AST SERPL-CCNC: 21 UNIT/L (ref 11–45)
BASOPHILS # BLD AUTO: 0.05 K/UL
BASOPHILS NFR BLD AUTO: 0.9 %
BILIRUB SERPL-MCNC: 0.5 MG/DL (ref 0.1–1)
BUN SERPL-MCNC: 13 MG/DL (ref 6–20)
CALCIUM SERPL-MCNC: 9.3 MG/DL (ref 8.7–10.5)
CHLORIDE SERPL-SCNC: 105 MMOL/L (ref 95–110)
CO2 SERPL-SCNC: 27 MMOL/L (ref 23–29)
CREAT SERPL-MCNC: 0.8 MG/DL (ref 0.5–1.4)
ERYTHROCYTE [DISTWIDTH] IN BLOOD BY AUTOMATED COUNT: 13.2 % (ref 11.5–14.5)
GFR SERPLBLD CREATININE-BSD FMLA CKD-EPI: >60 ML/MIN/1.73/M2
GLUCOSE SERPL-MCNC: 122 MG/DL (ref 70–110)
HCT VFR BLD AUTO: 41.7 % (ref 37–48.5)
HGB BLD-MCNC: 13 GM/DL (ref 12–16)
IMM GRANULOCYTES # BLD AUTO: 0.01 K/UL (ref 0–0.04)
IMM GRANULOCYTES NFR BLD AUTO: 0.2 % (ref 0–0.5)
INR PPP: 0.9 (ref 0.8–1.2)
LYMPHOCYTES # BLD AUTO: 1.56 K/UL (ref 1–4.8)
MCH RBC QN AUTO: 29.1 PG (ref 27–31)
MCHC RBC AUTO-ENTMCNC: 31.2 G/DL (ref 32–36)
MCV RBC AUTO: 93 FL (ref 82–98)
NUCLEATED RBC (/100WBC) (OHS): 0 /100 WBC
PLATELET # BLD AUTO: 225 K/UL (ref 150–450)
PMV BLD AUTO: 10.4 FL (ref 9.2–12.9)
POTASSIUM SERPL-SCNC: 4.9 MMOL/L (ref 3.5–5.1)
PROT SERPL-MCNC: 7.3 GM/DL (ref 6–8.4)
PROTHROMBIN TIME: 10.3 SECONDS (ref 9–12.5)
RBC # BLD AUTO: 4.47 M/UL (ref 4–5.4)
RELATIVE EOSINOPHIL (OHS): 3.2 %
RELATIVE LYMPHOCYTE (OHS): 27.9 % (ref 18–48)
RELATIVE MONOCYTE (OHS): 9.7 % (ref 4–15)
RELATIVE NEUTROPHIL (OHS): 58.1 % (ref 38–73)
SODIUM SERPL-SCNC: 138 MMOL/L (ref 136–145)
WBC # BLD AUTO: 5.59 K/UL (ref 3.9–12.7)

## 2025-06-11 PROCEDURE — 36415 COLL VENOUS BLD VENIPUNCTURE: CPT

## 2025-06-11 PROCEDURE — 85025 COMPLETE CBC W/AUTO DIFF WBC: CPT

## 2025-06-11 PROCEDURE — 84075 ASSAY ALKALINE PHOSPHATASE: CPT

## 2025-06-11 PROCEDURE — 85610 PROTHROMBIN TIME: CPT

## 2025-06-23 ENCOUNTER — CLINICAL SUPPORT (OUTPATIENT)
Dept: REHABILITATION | Facility: HOSPITAL | Age: 55
End: 2025-06-23
Payer: COMMERCIAL

## 2025-06-23 DIAGNOSIS — R29.898 WEAKNESS OF RIGHT HIP: Primary | ICD-10-CM

## 2025-06-23 DIAGNOSIS — M25.69 DECREASED RANGE OF MOTION OF TRUNK AND BACK: ICD-10-CM

## 2025-06-23 DIAGNOSIS — R29.898 WEAKNESS OF RIGHT SHOULDER: ICD-10-CM

## 2025-06-23 PROCEDURE — 97110 THERAPEUTIC EXERCISES: CPT

## 2025-06-23 PROCEDURE — 97140 MANUAL THERAPY 1/> REGIONS: CPT

## 2025-06-23 NOTE — PROGRESS NOTES
Outpatient Rehab    Physical Therapy Progress Note    Patient Name: Naima Jeffries  MRN: [id number]  YOB: 1970  Encounter Date: 6/23/2025    Therapy Diagnosis:   Encounter Diagnoses   Name Primary?    Weakness of right hip Yes    Decreased range of motion of trunk and back     Weakness of right shoulder      Physician: Kar Trevino III, *    Physician Orders: Eval and Treat  Medical Diagnosis: Primary osteoarthritis of right hip  Right shoulder pain, unspecified chronicity  Surgical Diagnosis: Not applicable for this Episode   Surgical Date: Not applicable for this Episode    Visit # / Visits Authorized:  6 / 10  Insurance Authorization Period: 5/5/2025 to 12/31/2025  Date of Evaluation: 5/5/2025  Plan of Care Certification: 5/5/2025 to 6/30/2025      PT/PTA:     Number of PTA visits since last PT visit:   Time In: 0900   Time Out: 0955  Total Time (in minutes): 55   Total Billable Time (in minutes): 55    FOTO:  Intake Score:  %  Survey Score 2:  %  Survey Score 3:  %    Precautions:       Subjective   Patient reports continued hip pain..  Pain reported as 5/10.      Objective      6/10/2025  Lower Extremity Strength  Right LE   Left LE   Goal   Knee extension: 5/5 Knee extension: 5/5 5/5   Knee flexion: 5/5 Knee flexion: 5/5 5/5   Hip flexion: 4/5 Hip flexion: 4/5 5/5   Hip extension:  4+/5 Hip extension: 4+/5 5/5   Hip abduction: 4/5 Hip abduction: 4+/5 5/5   Hip IR: 4+/5 Hip IR: 5/5 5/5   Hip ER: 4/5 Hip ER:  4+/5 5/5        Treatment:     Therapeutic Exercise for 40 minutes:     Single leg bridges 3 sec hold 3 x 8 each side   Sidelying clamshells blue TB 3 sec hold 3 x 10 bilateral  Sidelying hip abduction 3 sec hold 3 x 8 bilateral   Sit to stands holding 15 lb kettlebell 3 x 8 NPT  Shuttle leg press single LE 37 lbs (1 blk, 1 red) 3 x 10 each side   Lateral walks green TB below knees 10 yards x 4 laps   RDLs with 20 lb KB 3 x 10     Not performed:  Sidelying open book x 20  Prone T's 2 x  8  Prone Y's 2 x 8  Wall slide + lift off yellow TB 3 x 8  90/90 ER walkout green TB 1 x 10  Hip hinge with dowel 2 x 10     Manual Therapy for 15 minutes:     Bilateral LE long axis distraction grade III-IV  Bilateral hip inferior and lateral glides with mobilization belt, grade III-IV  Right hip prone P/A glides, grade III-IV - NP    Time Entry(in minutes):       Assessment & Plan   Assessment:       Re-assessment performed today and patient demonstrates improvement in bilateral hip strength, but still has mild strength deficits remaining.  She continues to tolerate therapeutic exercise well without c/o increased symptoms. Will progress treatment as tolerated.     The patient will continue to benefit from skilled outpatient physical therapy in order to address the deficits listed in the problem list on the initial evaluation, provide patient and family education, and maximize the patients level of independence in the home and community environments.     The patient's spiritual, cultural, and educational needs were considered, and the patient is agreeable to the plan of care and goals.         Plan:  Continue per established PT plan of care.     Goals:   Active       Long Term Goals       Long Term Goals       Start:  05/13/25    Expected End:  06/30/25       1.     Patient will demonstrate bilateral cervical rotation AROM at least 65 degrees.   2.     Patient will demonstrate Right hip strength 5/5.  3.     Pain Rating at Worst: 2/10 or less to improve overall Quality of Life.    4.     Patient will meet predicted functional outcome (FOTO) score: 10% improvement in functional ability or greater to increase self-perceived functional ability.  5.     Patient will be independent with updated HEP at discharge.               Short Term Goals       Short Term Goals       Start:  05/13/25    Expected End:  05/30/25       1.     Patient will be independent with HEP to supplement therapy in return to maximal function.  2.      Pain rating at Worst: 5/10 or less for improved tolerance with walking.   3.     Patient will demonstrate Right hip strength 4+/5.                Mirela Blunt, PT

## 2025-06-24 ENCOUNTER — OFFICE VISIT (OUTPATIENT)
Dept: ORTHOPEDICS | Facility: CLINIC | Age: 55
End: 2025-06-24
Payer: COMMERCIAL

## 2025-06-24 ENCOUNTER — HOSPITAL ENCOUNTER (OUTPATIENT)
Dept: RADIOLOGY | Facility: HOSPITAL | Age: 55
Discharge: HOME | End: 2025-06-24
Payer: COMMERCIAL

## 2025-06-24 ENCOUNTER — OFFICE VISIT (OUTPATIENT)
Dept: INTERNAL MEDICINE | Facility: CLINIC | Age: 55
End: 2025-06-24
Payer: COMMERCIAL

## 2025-06-24 VITALS
BODY MASS INDEX: 26.08 KG/M2 | TEMPERATURE: 99 F | SYSTOLIC BLOOD PRESSURE: 137 MMHG | DIASTOLIC BLOOD PRESSURE: 81 MMHG | HEART RATE: 66 BPM | HEIGHT: 67 IN | WEIGHT: 166.13 LBS | OXYGEN SATURATION: 99 %

## 2025-06-24 DIAGNOSIS — R29.898 WEAKNESS OF RIGHT SHOULDER: ICD-10-CM

## 2025-06-24 DIAGNOSIS — F43.21 SITUATIONAL DEPRESSION: ICD-10-CM

## 2025-06-24 DIAGNOSIS — N93.9 VAGINAL BLEEDING: Primary | ICD-10-CM

## 2025-06-24 DIAGNOSIS — M16.11 PRIMARY OSTEOARTHRITIS OF RIGHT HIP: ICD-10-CM

## 2025-06-24 DIAGNOSIS — M16.11 PRIMARY OSTEOARTHRITIS OF RIGHT HIP: Primary | ICD-10-CM

## 2025-06-24 DIAGNOSIS — Z96.641 S/P HIP REPLACEMENT, RIGHT: Primary | ICD-10-CM

## 2025-06-24 PROBLEM — N89.8 VAGINAL DISCHARGE: Status: RESOLVED | Noted: 2022-06-07 | Resolved: 2025-06-24

## 2025-06-24 PROBLEM — B37.9 CANDIDA GLABRATA INFECTION: Status: RESOLVED | Noted: 2022-06-29 | Resolved: 2025-06-24

## 2025-06-24 PROBLEM — Z71.89 COUNSELING FOR HORMONE REPLACEMENT THERAPY: Status: RESOLVED | Noted: 2021-12-07 | Resolved: 2025-06-24

## 2025-06-24 PROBLEM — Z97.5 IUD (INTRAUTERINE DEVICE) IN PLACE: Status: RESOLVED | Noted: 2020-07-24 | Resolved: 2025-06-24

## 2025-06-24 PROCEDURE — 99214 OFFICE O/P EST MOD 30 MIN: CPT | Mod: S$GLB,,,

## 2025-06-24 PROCEDURE — 72170 X-RAY EXAM OF PELVIS: CPT | Mod: 26,,, | Performed by: RADIOLOGY

## 2025-06-24 PROCEDURE — 3044F HG A1C LEVEL LT 7.0%: CPT | Mod: CPTII,S$GLB,,

## 2025-06-24 PROCEDURE — 99215 OFFICE O/P EST HI 40 MIN: CPT | Mod: S$GLB,,, | Performed by: NURSE PRACTITIONER

## 2025-06-24 PROCEDURE — 99999 PR PBB SHADOW E&M-EST. PATIENT-LVL V: CPT | Mod: PBBFAC,,, | Performed by: NURSE PRACTITIONER

## 2025-06-24 PROCEDURE — 72170 X-RAY EXAM OF PELVIS: CPT | Mod: TC

## 2025-06-24 PROCEDURE — 3008F BODY MASS INDEX DOCD: CPT | Mod: CPTII,S$GLB,, | Performed by: NURSE PRACTITIONER

## 2025-06-24 PROCEDURE — 3079F DIAST BP 80-89 MM HG: CPT | Mod: CPTII,S$GLB,, | Performed by: NURSE PRACTITIONER

## 2025-06-24 PROCEDURE — 3044F HG A1C LEVEL LT 7.0%: CPT | Mod: CPTII,S$GLB,, | Performed by: NURSE PRACTITIONER

## 2025-06-24 PROCEDURE — 1159F MED LIST DOCD IN RCRD: CPT | Mod: CPTII,S$GLB,, | Performed by: NURSE PRACTITIONER

## 2025-06-24 PROCEDURE — 1160F RVW MEDS BY RX/DR IN RCRD: CPT | Mod: CPTII,S$GLB,,

## 2025-06-24 PROCEDURE — 99999 PR PBB SHADOW E&M-EST. PATIENT-LVL III: CPT | Mod: PBBFAC,,,

## 2025-06-24 PROCEDURE — 3075F SYST BP GE 130 - 139MM HG: CPT | Mod: CPTII,S$GLB,, | Performed by: NURSE PRACTITIONER

## 2025-06-24 PROCEDURE — 1159F MED LIST DOCD IN RCRD: CPT | Mod: CPTII,S$GLB,,

## 2025-06-24 RX ORDER — POLYETHYLENE GLYCOL 3350 17 G/17G
17 POWDER, FOR SOLUTION ORAL DAILY
OUTPATIENT
Start: 2025-06-25

## 2025-06-24 RX ORDER — OXYCODONE HYDROCHLORIDE 5 MG/1
5 TABLET ORAL
Refills: 0 | OUTPATIENT
Start: 2025-06-24

## 2025-06-24 RX ORDER — ONDANSETRON HYDROCHLORIDE 2 MG/ML
4 INJECTION, SOLUTION INTRAVENOUS EVERY 8 HOURS PRN
OUTPATIENT
Start: 2025-06-24

## 2025-06-24 RX ORDER — METHOCARBAMOL 750 MG/1
750 TABLET, FILM COATED ORAL 3 TIMES DAILY
OUTPATIENT
Start: 2025-06-24

## 2025-06-24 RX ORDER — PROCHLORPERAZINE EDISYLATE 5 MG/ML
5 INJECTION INTRAMUSCULAR; INTRAVENOUS EVERY 6 HOURS PRN
OUTPATIENT
Start: 2025-06-24

## 2025-06-24 RX ORDER — PREGABALIN 75 MG/1
75 CAPSULE ORAL
OUTPATIENT
Start: 2025-06-24

## 2025-06-24 RX ORDER — SODIUM CHLORIDE 9 MG/ML
INJECTION, SOLUTION INTRAVENOUS
OUTPATIENT
Start: 2025-06-24

## 2025-06-24 RX ORDER — MORPHINE SULFATE 2 MG/ML
2 INJECTION, SOLUTION INTRAMUSCULAR; INTRAVENOUS
Refills: 0 | OUTPATIENT
Start: 2025-06-24

## 2025-06-24 RX ORDER — MUPIROCIN 20 MG/G
1 OINTMENT TOPICAL
OUTPATIENT
Start: 2025-06-24

## 2025-06-24 RX ORDER — NALOXONE HCL 0.4 MG/ML
0.02 VIAL (ML) INJECTION
OUTPATIENT
Start: 2025-06-24

## 2025-06-24 RX ORDER — POLYETHYLENE GLYCOL 3350 17 G/17G
17 POWDER, FOR SOLUTION ORAL DAILY PRN
OUTPATIENT
Start: 2025-06-24

## 2025-06-24 RX ORDER — MUPIROCIN 20 MG/G
1 OINTMENT TOPICAL 2 TIMES DAILY
OUTPATIENT
Start: 2025-06-24 | End: 2025-06-29

## 2025-06-24 RX ORDER — ACETAMINOPHEN 500 MG
1000 TABLET ORAL
OUTPATIENT
Start: 2025-06-24

## 2025-06-24 RX ORDER — ASPIRIN 81 MG/1
81 TABLET ORAL 2 TIMES DAILY
OUTPATIENT
Start: 2025-06-24

## 2025-06-24 RX ORDER — ACETAMINOPHEN 500 MG
1000 TABLET ORAL EVERY 6 HOURS
OUTPATIENT
Start: 2025-06-24

## 2025-06-24 RX ORDER — LIDOCAINE HYDROCHLORIDE 10 MG/ML
1 INJECTION, SOLUTION EPIDURAL; INFILTRATION; INTRACAUDAL; PERINEURAL
OUTPATIENT
Start: 2025-06-24

## 2025-06-24 RX ORDER — MAGNESIUM 200 MG
TABLET ORAL ONCE
COMMUNITY

## 2025-06-24 RX ORDER — AMOXICILLIN 250 MG
1 CAPSULE ORAL 2 TIMES DAILY
OUTPATIENT
Start: 2025-06-24

## 2025-06-24 RX ORDER — MIDAZOLAM HYDROCHLORIDE 1 MG/ML
4 INJECTION, SOLUTION INTRAMUSCULAR; INTRAVENOUS
OUTPATIENT
Start: 2025-06-24 | End: 2025-06-25

## 2025-06-24 RX ORDER — MULTIVIT WITH MINERALS/HERBS
1 TABLET ORAL DAILY
COMMUNITY

## 2025-06-24 RX ORDER — PREGABALIN 75 MG/1
75 CAPSULE ORAL NIGHTLY
OUTPATIENT
Start: 2025-06-24

## 2025-06-24 RX ORDER — FENTANYL CITRATE 50 UG/ML
25 INJECTION, SOLUTION INTRAMUSCULAR; INTRAVENOUS EVERY 5 MIN PRN
Refills: 0 | OUTPATIENT
Start: 2025-06-24

## 2025-06-24 RX ORDER — OXYCODONE HYDROCHLORIDE 5 MG/1
10 TABLET ORAL
Refills: 0 | OUTPATIENT
Start: 2025-06-24

## 2025-06-24 RX ORDER — FAMOTIDINE 20 MG/1
20 TABLET, FILM COATED ORAL 2 TIMES DAILY
OUTPATIENT
Start: 2025-06-24

## 2025-06-24 RX ORDER — SODIUM CHLORIDE 9 MG/ML
INJECTION, SOLUTION INTRAVENOUS CONTINUOUS
OUTPATIENT
Start: 2025-06-24 | End: 2025-06-25

## 2025-06-24 RX ORDER — FENTANYL CITRATE 50 UG/ML
100 INJECTION, SOLUTION INTRAMUSCULAR; INTRAVENOUS
Refills: 0 | OUTPATIENT
Start: 2025-06-24 | End: 2025-06-25

## 2025-06-24 RX ORDER — CELECOXIB 200 MG/1
200 CAPSULE ORAL DAILY
OUTPATIENT
Start: 2025-06-25

## 2025-06-24 RX ORDER — CELECOXIB 200 MG/1
400 CAPSULE ORAL ONCE
OUTPATIENT
Start: 2025-06-24 | End: 2025-06-24

## 2025-06-24 NOTE — PROGRESS NOTES
Naima Jeffries is a 54 y.o. year old here today for pre surgery optimization visit  in preparation for a Right total hip arthroplasty to be performed by Dr. Trevino on 7/17/2025.  she was last seen and treated in the clinic on 5/27/2025. she will be medically optimized by the pre op center. There has been no significant change in medical status since last visit. No fever, chills, malaise, or unexplained weight change.      She presents unaccompanied to today's visit.     Allergies, Medications, past medical and surgical history reviewed.    Focused examination performed.    Patient did not request to see surgeon in clinic today. All questions answered. Patient encouraged to call with questions. Contact information given.     Pre, lis, and post operative procedures and expectations discussed. Goals of successful surgery reviewed and include manageable pain levels, surgical site free of infection, medication management, and ambulation with PT/OT assistance. Healthy weight management discussed with patient and caregiver who were receptive to eduction of healthy diet and activity. No other necessary lifestyle changes identified. Educated patient about signs and symptoms of infection, medication management, anticoagulation therapy, risk of tobacco and alcohol use, and self-care to promote healing. Surgical guide given for future reference. Hibiclens given to patient with instructions. All questions were answered.     Naima Jeffries verbalized an understanding to the education and goals. Patient has displayed readiness to engage in care and is ready to proceed with surgery.  Patient reports her partner is able and ready to provide assistance at home after discharge.    Surgical and blood consents signed.    DME will be arranged. The mobility limitation cannot be sufficiently resolved by the use of a cane. Patient's functional mobility deficit can be sufficiently resolved with the use of a (Rolling Walker or Walker). Patient's  "mobility limitation significantly impairs their ability to participate in one of more activities of daily living. The use of a (Rolling Walker or Walker) will significantly improve the patient's ability to participate in MRADLS and the patient will use it on regular basis in the home."     Naima Jeffries will contact us if there are any questions, concerns, or changes in medical status prior to surgery.       Joint class: 6/9/2025    Patient has discussed discharge planning with surgeon. Patient will be discharged to home following surgery.   patient will be scheduled with [facility]. PT will be done at the [place] location.    30 minutes of time was spent on patient education, review of records, templating, H&P, , appointment scheduling and optimizing patient for surgery.     "

## 2025-06-24 NOTE — H&P (VIEW-ONLY)
CC:  Right hip pain    Naima Jeffries is a 54 y.o. female with Right hip pain.  Pain is worse with activity and weight bearing.  Patient has experienced interference of activities of daily living due to increased pain and decreased range of motion. Patient has failed non-operative treatment including NSAIDs, as well as greater than 3 months of activity modification. Naima Jeffries ambulates independently.     Relevant medical conditions of significance in perioperative period:  N/a    Given documented medical comorbidities including those listed above and based off of CMS criteria patient would meet inpatient admission status guidelines. This case has been approved as inpatient.     Past Medical History:   Diagnosis Date    Abnormal Pap smear of cervix 1989    Anxiety     Candida glabrata infection 06/29/2022    Counseling for hormone replacement therapy 12/07/2021    Depression     Fever blister     IUD (intrauterine device) in place 07/24/2020    Mirena 9/2015      S/P D&C (status post dilation and curettage)     Vaginal discharge 06/07/2022       Past Surgical History:   Procedure Laterality Date    BREAST BIOPSY Right 2018    benign    COLONOSCOPY N/A 07/28/2022    Procedure: COLONOSCOPY;  Surgeon: Akua Haider MD;  Location: [facility] ([address] [place] [address]);  Service: Endoscopy;  Laterality: N/A;  original order from Dr Florian on 11/5/21-unusable. new order placed        vaccinated-GT    COLONOSCOPY N/A 11/09/2022    Procedure: COLONOSCOPY;  Surgeon: Gael Rico MD;  Location: [facility] ([address] [place] [address]);  Service: Endoscopy;  Laterality: N/A;  old case request unusable, new case request placed  fully vaccinated, instructions emailed to [email or web address]  golytely. pre call complete A.s    EXCISION OF PTERYGIUM Right     LASER ABLATION OF THE CERVIX  1989       Family History   Problem Relation Name Age of Onset    Stroke Mother      Breast cancer Paternal Aunt      Breast cancer Cousin paternal     Colon cancer  Neg Hx      Ovarian cancer Neg Hx      Uterine cancer Neg Hx      Cervical cancer Neg Hx      Cancer Neg Hx         Review of patient's allergies indicates:  No Known Allergies    Current Medications[1]    Review of Systems:   Constitutional: no fever or chills  Eyes: no visual changes  ENT: no nasal congestion or sore throat  Respiratory: no cough or shortness of breath  Cardiovascular: no chest pain or palpitations  Gastrointestinal: no nausea or vomiting, tolerating diet  Genitourinary: no hematuria or dysuria  Integument/Breast: no rash or pruritis  Hematologic/Lymphatic: no easy bruising or lymphadenopathy  Musculoskeletal: positive for hip pain  Neurological: no seizures or tremors  Behavioral/Psych: no auditory or visual hallucinations  Endocrine: no heat or cold intolerance    PE:  There were no vitals taken for this visit.  General: Pleasant, cooperative, NAD   Gait: antalgic  HEENT: NCAT, sclera nonicteric   Lungs: Respirations are clear, equal and unlabored.   CV: S1S2; 2+ bilateral upper and lower extremity pulses.   Skin: Intact throughout LE with no rashes, erythema, or lesions  Extremities: No LE edema, NVI lower extremities    Right Hip Exam:  100 degrees flexion  0 degrees extension   20 degrees internal rotation  50 degrees external rotation  30 degrees abduction  20 degrees adduction  There is pain with passive range of motion.     Radiographs: Radiographs reveal advanced degenerative changes including subchondral cyst formation, subchondral sclerosis, osteophyte formation, joint space narrowing.     Diagnosis: Primary osteoarthritis Right hip    Plan: Right total hip arthroplasty     Due to the serious nature of total joint infection and the high prevalence of community acquired MRSA, vancomycin will be used perioperatively.                  [1]   Current Outpatient Medications:     b complex vitamins tablet, Take 1 tablet by mouth once daily., Disp: , Rfl:     EScitalopram oxalate (LEXAPRO) 10  MG tablet, Take 1 tablet (10 mg total) by mouth once daily., Disp: 90 tablet, Rfl: 3    estradioL (VIVELLE-DOT) 0.025 mg/24 hr, Place 1 patch onto the skin twice a week., Disp: 8 patch, Rfl: 11    magnesium 200 mg Tab, Take by mouth once., Disp: , Rfl:     meloxicam (MOBIC) 15 MG tablet, Take 1 tablet (15 mg total) by mouth once daily. Additional refills should come from primary care provider, Disp: 30 tablet, Rfl: 2    multivitamin with iron (HAIR VITAMINS ORAL), Take by mouth. Nutrafol, Disp: , Rfl:     progesterone (PROMETRIUM) 100 MG capsule, Take 1 capsule (100 mg total) by mouth nightly., Disp: 30 capsule, Rfl: 11    UNABLE TO FIND, medication name: Testosterone 1% Cream Apply 1 click to the upper inner thigh daily, Disp: 30 g, Rfl: 5

## 2025-06-24 NOTE — HPI
This is a 54 y.o. female  who presents today for a preoperative evaluation in preparation for right hip replacement  Surgery is indicated for right hip osteoarhtritis.   Patient is new to me.    The history has been obtained by speaking with the patient and reviewing the electronic medical record and/or outside health information. Significant health conditions for the perioperative period are discussed below in assessment and plan.     Patient reports current health status to be Good.  Denies any new symptoms before surgery.

## 2025-06-24 NOTE — H&P
CC:  Right hip pain    Naima Jeffries is a 54 y.o. female with Right hip pain.  Pain is worse with activity and weight bearing.  Patient has experienced interference of activities of daily living due to increased pain and decreased range of motion. Patient has failed non-operative treatment including NSAIDs, as well as greater than 3 months of activity modification. Naima Jeffries ambulates independently.     Relevant medical conditions of significance in perioperative period:  N/a    Given documented medical comorbidities including those listed above and based off of CMS criteria patient would meet inpatient admission status guidelines. This case has been approved as inpatient.     Past Medical History:   Diagnosis Date    Abnormal Pap smear of cervix 1989    Anxiety     Candida glabrata infection 06/29/2022    Counseling for hormone replacement therapy 12/07/2021    Depression     Fever blister     IUD (intrauterine device) in place 07/24/2020    Mirena 9/2015      S/P D&C (status post dilation and curettage)     Vaginal discharge 06/07/2022       Past Surgical History:   Procedure Laterality Date    BREAST BIOPSY Right 2018    benign    COLONOSCOPY N/A 07/28/2022    Procedure: COLONOSCOPY;  Surgeon: Akua Haider MD;  Location: [facility] ([address] [place] [address]);  Service: Endoscopy;  Laterality: N/A;  original order from Dr Florian on 11/5/21-unusable. new order placed        vaccinated-GT    COLONOSCOPY N/A 11/09/2022    Procedure: COLONOSCOPY;  Surgeon: Gael Rico MD;  Location: [facility] ([address] [place] [address]);  Service: Endoscopy;  Laterality: N/A;  old case request unusable, new case request placed  fully vaccinated, instructions emailed to [email or web address]  golytely. pre call complete A.s    EXCISION OF PTERYGIUM Right     LASER ABLATION OF THE CERVIX  1989       Family History   Problem Relation Name Age of Onset    Stroke Mother      Breast cancer Paternal Aunt      Breast cancer Cousin paternal     Colon cancer  Neg Hx      Ovarian cancer Neg Hx      Uterine cancer Neg Hx      Cervical cancer Neg Hx      Cancer Neg Hx         Review of patient's allergies indicates:  No Known Allergies    Current Medications[1]    Review of Systems:   Constitutional: no fever or chills  Eyes: no visual changes  ENT: no nasal congestion or sore throat  Respiratory: no cough or shortness of breath  Cardiovascular: no chest pain or palpitations  Gastrointestinal: no nausea or vomiting, tolerating diet  Genitourinary: no hematuria or dysuria  Integument/Breast: no rash or pruritis  Hematologic/Lymphatic: no easy bruising or lymphadenopathy  Musculoskeletal: positive for hip pain  Neurological: no seizures or tremors  Behavioral/Psych: no auditory or visual hallucinations  Endocrine: no heat or cold intolerance    PE:  There were no vitals taken for this visit.  General: Pleasant, cooperative, NAD   Gait: antalgic  HEENT: NCAT, sclera nonicteric   Lungs: Respirations are clear, equal and unlabored.   CV: S1S2; 2+ bilateral upper and lower extremity pulses.   Skin: Intact throughout LE with no rashes, erythema, or lesions  Extremities: No LE edema, NVI lower extremities    Right Hip Exam:  100 degrees flexion  0 degrees extension   20 degrees internal rotation  50 degrees external rotation  30 degrees abduction  20 degrees adduction  There is pain with passive range of motion.     Radiographs: Radiographs reveal advanced degenerative changes including subchondral cyst formation, subchondral sclerosis, osteophyte formation, joint space narrowing.     Diagnosis: Primary osteoarthritis Right hip    Plan: Right total hip arthroplasty     Due to the serious nature of total joint infection and the high prevalence of community acquired MRSA, vancomycin will be used perioperatively.                  [1]   Current Outpatient Medications:     b complex vitamins tablet, Take 1 tablet by mouth once daily., Disp: , Rfl:     EScitalopram oxalate (LEXAPRO) 10  MG tablet, Take 1 tablet (10 mg total) by mouth once daily., Disp: 90 tablet, Rfl: 3    estradioL (VIVELLE-DOT) 0.025 mg/24 hr, Place 1 patch onto the skin twice a week., Disp: 8 patch, Rfl: 11    magnesium 200 mg Tab, Take by mouth once., Disp: , Rfl:     meloxicam (MOBIC) 15 MG tablet, Take 1 tablet (15 mg total) by mouth once daily. Additional refills should come from primary care provider, Disp: 30 tablet, Rfl: 2    multivitamin with iron (HAIR VITAMINS ORAL), Take by mouth. Nutrafol, Disp: , Rfl:     progesterone (PROMETRIUM) 100 MG capsule, Take 1 capsule (100 mg total) by mouth nightly., Disp: 30 capsule, Rfl: 11    UNABLE TO FIND, medication name: Testosterone 1% Cream Apply 1 click to the upper inner thigh daily, Disp: 30 g, Rfl: 5

## 2025-06-24 NOTE — PROGRESS NOTES
[facility]    HOME HEALTH ORDERS  FACE TO FACE ENCOUNTER    Patient Name: Naima Jeffries  YOB: 1970    PCP: Isela Florian MD   PCP Address: [address], [address] / [place] [address]  PCP Phone Number: [phone]  PCP Fax: [phone]    Encounter Date: 06/24/2025    Admit to Home Health    Diagnoses:  There are no hospital problems to display for this patient.      Future Appointments   Date Time Provider Department Center   6/27/2025  1:15 PM Alden Talavera PA-C [facility] DERM [address]   6/30/2025  9:00 AM Mirela Blunt, PT [facility] [address]   6/30/2025  2:15 PM Jane Collins MD [facility]   7/8/2025  9:00 AM Mirela Blunt, PT [facility] [address]   7/22/2025  2:40 PM TELEMED NURSE, [facility] ORTHO [facility] [address]   7/31/2025 11:15 AM Tamir Peterson PA-C [facility]THO [address]   8/18/2025 10:00 AM Nhi Dang MD [facility] DER[facility] [address]   8/25/2025  9:45 AM Jane Johnson PA-C [facility] WO [facility]           I have seen and examined this patient face to face today. My clinical findings that support the need for the home health skilled services and home bound status are the following:  Weakness/numbness causing balance and gait disturbance due to Joint Replacement making it taxing to leave home.    Allergies:Review of patient's allergies indicates:  No Known Allergies    Diet: regular diet    Activities: activity as tolerated    Home Health Admitting Clinician:   SN/PT to complete comprehensive assessment including routine vital signs. Instruct on disease process and s/s of complications to report to MD. Follow specific home health arthoplasty protocol. Review/verify medication list sent home with the patient at time of discharge  and instruct patient/caregiver as needed. If coumadin ordered, coumadin clinic to manage INR with INR draws 2x per week with a goal to maintain INR between 1.8 and 2.2. Frequency may be  adjusted depending on start of care date.    Notify MD if SBP > 160 or < 90; DBP > 90 or < 50; HR > 120 or < 50; Temp > 101    Home Medical Equipment:  Walker, 3-1 bedside commode, transfer tub bench    CONSULTS:    Physical Therapy may admit if patient not on coumadin, PT to perform comprehensive assessment if performing admit visit and generate therapy plan of care. Evaluate for home safety and equipment needs; Establish/upgrade home exercise program. Perform/instruct on therapeutic exercises, gait training, transfer training, and Range of Motion.      OTHER: (only select if patient needs other therapy disciplines)  Occupational Therapy to evaluate and treat. Evaluate home environment for safety and equipment needs. Perform/Instruct on transfers, ADL training, ROM, and therapeutic exercises.    WOUND CARE ORDERS:  Assess Surgical Incision/DSRG each TX  Aquacel AG drsg applied post-op leave on 14 days post op. Call MD if any drainage reaches border to border of drsg horizontally, s/s of infection, temp >101, induration, swelling or redness.  If dressing is removed per MD order, then apply island dressing, change/teach caregiver to perform daily dressing change if island dressing present.    Medications: Review discharge medications with patient and family and provide education.      Cannot display discharge medications since this is not an admission.      I certify that this patient is confined to her home and needs physical therapy and occupational therapy.    Dr. Trevino agrees with home health therapy.

## 2025-06-24 NOTE — ASSESSMENT & PLAN NOTE
Recent vaginal bleeding BRB for the past 3 days  She is perimenopausal  Referral placed to GYN for evalaution

## 2025-06-24 NOTE — OUTPATIENT SUBJECTIVE & OBJECTIVE
Outpatient Subjective & Objective      Chief Complaint: Preoperative evaulation, perioperative medical management, and complication reduction plan.     Functional Capacity:  Able to climb a flight of stairs without CP SOB or Syncope.  Able to meet 4 METs      Anesthesia issues: H/O She reports epidural did not work during childbirth    Difficulty mouth opening: none    Steroid use in the last 12 months: had a round of steroids for shoulder pain    Dental Issues: crown needs      Family anesthesia difficulty: None   Family Hx of Thrombosis none    Past Medical History:   Diagnosis Date    Abnormal Pap smear of cervix 1989    Anxiety     Candida glabrata infection 06/29/2022    Counseling for hormone replacement therapy 12/07/2021    Depression     Fever blister     IUD (intrauterine device) in place 07/24/2020    Mirena 9/2015      S/P D&C (status post dilation and curettage)     Vaginal discharge 06/07/2022         Past Surgical History:   Procedure Laterality Date    BREAST BIOPSY Right 2018    benign    COLONOSCOPY N/A 07/28/2022    Procedure: COLONOSCOPY;  Surgeon: Akua Haider MD;  [facility]  Service: Endoscopy;  Laterality: N/A;  original order from Dr Florian on 11/5/21-unusable. new order placed        vaccinated-GT    COLONOSCOPY N/A 11/09/2022    Procedure: COLONOSCOPY;  Surgeon: Gael Rico MD;  [facility]  Service: Endoscopy;  Laterality: N/A;  old case request unusable, new case request placed  fully vaccinated, instructions emailed to [email or web address]  golytely. pre call complete A.s    EXCISION OF PTERYGIUM Right     LASER ABLATION OF THE CERVIX  1989       Review of Systems   Constitutional:  Negative for chills, fatigue and fever.   HENT:  Negative for trouble swallowing and voice change.    Eyes:  Negative for photophobia and visual disturbance.        No acute visual changes   Respiratory:  Negative for apnea, cough, chest tightness,  "shortness of breath and wheezing.         STOP bang  Score 2  Low risk ADRIEL     Cardiovascular:  Negative for chest pain, palpitations and leg swelling.   Gastrointestinal:  Negative for abdominal distention, abdominal pain, blood in stool, constipation, diarrhea, nausea and vomiting.        NO FLD, hepatitis, cirrhosis  No BRB or black tarry stool    Loose BMS at times     Genitourinary:  Positive for vaginal bleeding. Negative for difficulty urinating, dysuria, flank pain, frequency, hematuria and urgency.   Musculoskeletal:  Positive for arthralgias, back pain and gait problem. Negative for joint swelling, myalgias, neck pain and neck stiffness.   Neurological:  Positive for numbness. Negative for dizziness, tremors, seizures, syncope, weakness, light-headedness and headaches.        Right hand carpal tunnel   Psychiatric/Behavioral:  Negative for suicidal ideas.           VITALS  Visit Vitals  /81 (BP Location: Right arm, Patient Position: Sitting)   Pulse 66   Temp 98.6 °F (37 °C)   Ht 5' 7" (1.702 m)   Wt 75.4 kg (166 lb 1.9 oz)   SpO2 99%   BMI 26.02 kg/m²          Physical Exam  Constitutional:       General: She is not in acute distress.     Appearance: Normal appearance. She is well-developed. She is not diaphoretic.   HENT:      Head: Normocephalic.      Right Ear: Hearing normal.      Left Ear: Hearing normal.      Nose: Nose normal.      Mouth/Throat:      Lips: Pink.      Mouth: Mucous membranes are moist.      Pharynx: No oropharyngeal exudate.   Eyes:      General: Lids are normal.         Right eye: No discharge.         Left eye: No discharge.      Conjunctiva/sclera: Conjunctivae normal.      Pupils: Pupils are equal, round, and reactive to light.   Neck:      Thyroid: No thyromegaly.      Vascular: No carotid bruit or JVD.      Trachea: Trachea and phonation normal. No tracheal deviation.   Cardiovascular:      Rate and Rhythm: Normal rate and regular rhythm.      Pulses: Normal pulses.    "        Carotid pulses are 2+ on the right side and 2+ on the left side.       Radial pulses are 2+ on the right side and 2+ on the left side.        Posterior tibial pulses are 2+ on the right side and 2+ on the left side.      Heart sounds: Normal heart sounds. No murmur heard.     No friction rub. No gallop.   Pulmonary:      Effort: Pulmonary effort is normal. No respiratory distress.      Breath sounds: Normal breath sounds. No stridor. No wheezing or rales.      Comments: Clear Anterior and Posterior BBS  Abdominal:      General: Abdomen is protuberant. Bowel sounds are normal. There is no distension.      Palpations: Abdomen is soft.      Tenderness: There is no abdominal tenderness. There is no guarding.   Musculoskeletal:         General: No tenderness or deformity. Normal range of motion.      Cervical back: Normal range of motion and neck supple. No rigidity.      Right lower leg: No edema.      Left lower leg: No edema.   Lymphadenopathy:      Head:      Right side of head: No submental, submandibular, tonsillar, preauricular, posterior auricular or occipital adenopathy.      Left side of head: No submental, submandibular, tonsillar, preauricular, posterior auricular or occipital adenopathy.      Cervical: No cervical adenopathy.      Right cervical: No superficial cervical adenopathy.     Left cervical: No superficial cervical adenopathy.   Skin:     General: Skin is warm and dry.      Capillary Refill: Capillary refill takes 2 to 3 seconds.      Coloration: Skin is not pale.      Findings: No erythema or rash.   Neurological:      Mental Status: She is alert and oriented to person, place, and time. Mental status is at baseline.      GCS: GCS eye subscore is 4. GCS verbal subscore is 5. GCS motor subscore is 6.      Motor: No abnormal muscle tone.      Coordination: Coordination normal.   Psychiatric:         Attention and Perception: Attention and perception normal.         Mood and Affect: Mood and  affect normal.         Speech: Speech normal.         Behavior: Behavior normal. Behavior is cooperative.         Thought Content: Thought content normal.         Cognition and Memory: Cognition and memory normal.         Judgment: Judgment normal.          Significant Labs:  Lab Results   Component Value Date    WBC 5.59 06/11/2025    HGB 13.0 06/11/2025    HCT 41.7 06/11/2025     06/11/2025    CHOL 227 (H) 03/14/2025    TRIG 40 03/14/2025     (H) 03/14/2025    ALT 15 06/11/2025    AST 21 06/11/2025     06/11/2025    K 4.9 06/11/2025     06/11/2025    CREATININE 0.8 06/11/2025    BUN 13 06/11/2025    CO2 27 06/11/2025    TSH 2.002 03/14/2025    INR 0.9 06/11/2025    HGBA1C 5.1 03/14/2025       Diagnostic Studies: No relevant studies.    EKG:   Results for orders placed or performed in visit on 03/16/23   EKG 12-lead    Collection Time: 03/16/23 10:10 AM    Narrative    Test Reason : Z00.00,    Vent. Rate : 051 BPM     Atrial Rate : 051 BPM     P-R Int : 150 ms          QRS Dur : 074 ms      QT Int : 446 ms       P-R-T Axes : 003 044 029 degrees     QTc Int : 411 ms    Sinus bradycardia  Otherwise normal ECG  No previous ECGs available  Confirmed by Quinn Tobias MD ([id number]) on 3/16/2023 1:05:41 PM    Referred By: CARLITOS CLARK           Confirmed By:Quinn Tobias MD       2D ECHO:  TTE:  No results found for this or any previous visit.    LUCY:  No results found for this or any previous visit.     Imaging     Active Cardiac Conditions: None      Revised Cardiac Risk Index   High -Risk Surgery  Intraperitoneal; Intrathoracic; suprainguinal vascular Yes- + 1 No- 0   History of Ischemic Heart Disease   (Hx of MI/positive exercise test/current chest pain due to ischemia/use of nitrate therapy/EKG with pathological Q waves) Yes- + 1 No- 0   History of CHF  (Pulmonary edema/bilateral rales or S3 gallop/PND/CXR showing pulmonary vascular redistribution) Yes- + 1 No- 0   History of CVA   (Prior stroke  or TIA) Yes- + 1 No- 0   Pre-operative treatment with insulin Yes- + 1 No- 0   Pre-operative creatinine > 2mg/dl Yes- + 1 No- 0   Total:    Risk Status:  Estimated risk of cardiac complications after non-cardiac surgery using the Revised Cardiac Risk Index for Preoperative risk is 3.9 %      ARISCAT (Radha) risk index: Low: 1.6% risk of post-op pulmonary complications.    American Society of Anesthesiologists Physical Status classification (ASA): 2           Outpatient Subjective & Objective

## 2025-06-24 NOTE — PROGRESS NOTES
[facility]  Progress Note    Patient Name: Naima Jeffries  MRN: [id number]  Date of Evaluation- 06/24/2025  PCP- Isela Florian MD    Future cases for Naima Jeffries [[id number]]       Case ID Status Date Time Solomon Procedure Provider Location    [id number] [facility] 7/17/2025  9:40  ARTHROPLASTY, HIP, TOTAL, ANTERIOR APPROACH: RIGHT: DEPUY - ACTIS + EMPHASYS Kar Trevino III, MD [[id number]] [facility] OR            HPI:  This is a 54 y.o. female  who presents today for a preoperative evaluation in preparation for right hip replacement  Surgery is indicated for right hip osteoarhtritis.   Patient is new to me.    The history has been obtained by speaking with the patient and reviewing the electronic medical record and/or outside health information. Significant health conditions for the perioperative period are discussed below in assessment and plan.     Patient reports current health status to be Good.  Denies any new symptoms before surgery.       Subjective/ Objective:     Chief Complaint: Preoperative evaulation, perioperative medical management, and complication reduction plan.     Functional Capacity:  Able to climb a flight of stairs without CP SOB or Syncope.  Able to meet 4 METs      Anesthesia issues: H/O She reports epidural did not work during childbirth    Difficulty mouth opening: none    Steroid use in the last 12 months: had a round of steroids for shoulder pain    Dental Issues: crown needs      Family anesthesia difficulty: None   Family Hx of Thrombosis none    Past Medical History:   Diagnosis Date    Abnormal Pap smear of cervix 1989    Anxiety     Depression     Fever blister     S/P D&C (status post dilation and curettage)          Past Surgical History:   Procedure Laterality Date    BREAST BIOPSY Right 2018    benign    COLONOSCOPY N/A 07/28/2022    Procedure: COLONOSCOPY;  Surgeon: Akua Haider MD;  Location: [facility] (4TH FLR);  Service: Endoscopy;  Laterality: N/A;   original order from Dr Florian on 11/5/21-unusable. new order placed        vaccinated-GT    COLONOSCOPY N/A 11/09/2022    Procedure: COLONOSCOPY;  Surgeon: Gael Rico MD;  Location: [facility] ([address] [place] [address]);  Service: Endoscopy;  Laterality: N/A;  old case request unusable, new case request placed  fully vaccinated, instructions emailed to [email or web address]  golytely. pre call complete A.s    EXCISION OF PTERYGIUM Right     LASER ABLATION OF THE CERVIX  1989       Review of Systems   Constitutional:  Negative for chills, fatigue and fever.   HENT:  Negative for trouble swallowing and voice change.    Eyes:  Negative for photophobia and visual disturbance.        No acute visual changes   Respiratory:  Negative for apnea, cough, chest tightness, shortness of breath and wheezing.         STOP bang  Score 2  Low risk ADRIEL     Cardiovascular:  Negative for chest pain, palpitations and leg swelling.   Gastrointestinal:  Negative for abdominal distention, abdominal pain, blood in stool, constipation, diarrhea, nausea and vomiting.        NO FLD, hepatitis, cirrhosis  No BRB or black tarry stool    Loose BMS at times     Genitourinary:  Positive for vaginal bleeding. Negative for difficulty urinating, dysuria, flank pain, frequency, hematuria and urgency.   Musculoskeletal:  Positive for arthralgias, back pain and gait problem. Negative for joint swelling, myalgias, neck pain and neck stiffness.   Neurological:  Positive for numbness. Negative for dizziness, tremors, seizures, syncope, weakness, light-headedness and headaches.        Right hand carpal tunnel   Psychiatric/Behavioral:  Negative for suicidal ideas.           VITALS  There were no vitals taken for this visit.       Physical Exam  Constitutional:       General: She is not in acute distress.     Appearance: Normal appearance. She is well-developed. She is not diaphoretic.   HENT:      Head: Normocephalic.      Right Ear: Hearing normal.      Left  Ear: Hearing normal.      Nose: Nose normal.      Mouth/Throat:      Lips: Pink.      Mouth: Mucous membranes are moist.      Pharynx: No oropharyngeal exudate.   Eyes:      General: Lids are normal.         Right eye: No discharge.         Left eye: No discharge.      Conjunctiva/sclera: Conjunctivae normal.      Pupils: Pupils are equal, round, and reactive to light.   Neck:      Thyroid: No thyromegaly.      Vascular: No carotid bruit or JVD.      Trachea: Trachea and phonation normal. No tracheal deviation.   Cardiovascular:      Rate and Rhythm: Normal rate and regular rhythm.      Pulses: Normal pulses.           Carotid pulses are 2+ on the right side and 2+ on the left side.       Radial pulses are 2+ on the right side and 2+ on the left side.        Posterior tibial pulses are 2+ on the right side and 2+ on the left side.      Heart sounds: Normal heart sounds. No murmur heard.     No friction rub. No gallop.   Pulmonary:      Effort: Pulmonary effort is normal. No respiratory distress.      Breath sounds: Normal breath sounds. No stridor. No wheezing or rales.      Comments: Clear Anterior and Posterior BBS  Abdominal:      General: Abdomen is protuberant. Bowel sounds are normal. There is no distension.      Palpations: Abdomen is soft.      Tenderness: There is no abdominal tenderness. There is no guarding.   Musculoskeletal:         General: No tenderness or deformity. Normal range of motion.      Cervical back: Normal range of motion and neck supple. No rigidity.      Right lower leg: No edema.      Left lower leg: No edema.   Lymphadenopathy:      Head:      Right side of head: No submental, submandibular, tonsillar, preauricular, posterior auricular or occipital adenopathy.      Left side of head: No submental, submandibular, tonsillar, preauricular, posterior auricular or occipital adenopathy.      Cervical: No cervical adenopathy.      Right cervical: No superficial cervical adenopathy.     Left  cervical: No superficial cervical adenopathy.   Skin:     General: Skin is warm and dry.      Capillary Refill: Capillary refill takes 2 to 3 seconds.      Coloration: Skin is not pale.      Findings: No erythema or rash.   Neurological:      Mental Status: She is alert and oriented to person, place, and time. Mental status is at baseline.      GCS: GCS eye subscore is 4. GCS verbal subscore is 5. GCS motor subscore is 6.      Motor: No abnormal muscle tone.      Coordination: Coordination normal.   Psychiatric:         Attention and Perception: Attention and perception normal.         Mood and Affect: Mood and affect normal.         Speech: Speech normal.         Behavior: Behavior normal. Behavior is cooperative.         Thought Content: Thought content normal.         Cognition and Memory: Cognition and memory normal.         Judgment: Judgment normal.          Significant Labs:  Lab Results   Component Value Date    WBC 5.59 06/11/2025    HGB 13.0 06/11/2025    HCT 41.7 06/11/2025     06/11/2025    CHOL 227 (H) 03/14/2025    TRIG 40 03/14/2025     (H) 03/14/2025    ALT 15 06/11/2025    AST 21 06/11/2025     06/11/2025    K 4.9 06/11/2025     06/11/2025    CREATININE 0.8 06/11/2025    BUN 13 06/11/2025    CO2 27 06/11/2025    TSH 2.002 03/14/2025    INR 0.9 06/11/2025    HGBA1C 5.1 03/14/2025       Diagnostic Studies: No relevant studies.    EKG:   Results for orders placed or performed in visit on 03/16/23   EKG 12-lead    Collection Time: 03/16/23 10:10 AM    Narrative    Test Reason : Z00.00,    Vent. Rate : 051 BPM     Atrial Rate : 051 BPM     P-R Int : 150 ms          QRS Dur : 074 ms      QT Int : 446 ms       P-R-T Axes : 003 044 029 degrees     QTc Int : 411 ms    Sinus bradycardia  Otherwise normal ECG  No previous ECGs available  Confirmed by Quinn Tobias MD ([id number]) on 3/16/2023 1:05:41 PM    Referred By: CARLITOS CLARK           Confirmed By:Quinn Tobias MD       2D  ECHO:  TTE:  No results found for this or any previous visit.    LUCY:  No results found for this or any previous visit.     Imaging     Active Cardiac Conditions: None      Revised Cardiac Risk Index   High -Risk Surgery  Intraperitoneal; Intrathoracic; suprainguinal vascular Yes- + 1 No- 0   History of Ischemic Heart Disease   (Hx of MI/positive exercise test/current chest pain due to ischemia/use of nitrate therapy/EKG with pathological Q waves) Yes- + 1 No- 0   History of CHF  (Pulmonary edema/bilateral rales or S3 gallop/PND/CXR showing pulmonary vascular redistribution) Yes- + 1 No- 0   History of CVA   (Prior stroke or TIA) Yes- + 1 No- 0   Pre-operative treatment with insulin Yes- + 1 No- 0   Pre-operative creatinine > 2mg/dl Yes- + 1 No- 0   Total:    Risk Status:  Estimated risk of cardiac complications after non-cardiac surgery using the Revised Cardiac Risk Index for Preoperative risk is 3.9 %      ARISCAT (Canet) risk index: Low: 1.6% risk of post-op pulmonary complications.    American Society of Anesthesiologists Physical Status classification (ASA): 2               Preoperative cardiac risk assessment-  The patient does not have any active cardiac conditions . Revised cardiac risk index predictors- ---.Functional capacity is more than 4 Mets. She will be undergoing a Orthopedic procedure that carries a Moderate Risk risk     The estimated risk of the rate of adverse cardiac outcomes  3.9    No further cardiac work up is indicated prior to proceeding with the surgery     Orders Placed This Encounter    Ambulatory referral/consult to Gynecology       American Society of Anesthesiologists Physical status classification ( ASA ) class: 3     Postoperative pulmonary complication risk assessment: 1.6      ARISCAT ( Canet) risk index- risk class -  Low, if duration of surgery is under 3 hours, intermediate, if duration of surgery is over 3 hours          Assessment/Plan:     Osteoarthritis of right  hip  Surgery scheduled 7/17/25    Vaginal bleeding  Recent vaginal bleeding BRB for the past 3 days  She is perimenopausal  Referral placed to GYN for evalaution    Weakness of right shoulder  Right shoulder pain  Tretaed with PTx    Situational depression  No reports of HI or SI  Lexapro        Preventive perioperative care    Thromboembolic prophylaxis:  Her risk factors for thrombosis include surgical procedure, age, and reduced mobility.I suggest  thromboembolic prophylaxis ( mechanical/pharmacological, weighing the risk benefits of pharmacological agent use considering lis procedural bleeding )  during the perioperative period.I suggested being active in the post operative period.      Postoperative pulmonary complication prophylaxis-Risk factors for post operative pulmonary complications include ASA class >2- I suggest incentive spirometry use, early ambulation, and pain control so as to avoid diaphragmatic splinting  Brush teeth twice per day, oral rinses, sleep with the head of the bed up 30 degrees     Renal complication prophylaxis . I suggest keeping her well hydrated and avoidance/ minimizing the use of  NSAID's,SCHAEFFER 2 Inhibitors ,IV contrast if possible in the perioperative period.I suggested drinking 2 litre's of water a day      Surgical site Infection Prophylaxis-I  suggest appropriate antibiotic for Prophylaxis against Surgical site infections Shower with Hibiclens in the night before surgery and the morning of surgery        In view of Spine procedure the patient  is at risk of postoperative urinary retention.  I suggest avoidance / minimizing the of  Benzodiazepines,Anticholinergic medication,antihistamines ( Benadryl) , if possible in the perioperative period. I suggest using the minimum possible use of opioids for the minimum period of time in the perioperative period. Benadryl avoidance suggested      This visit was focused on Preoperative evaluation, Perioperative Medical management,  complication reduction plans. I suggest that the patient follows up with primary care or relevant sub specialists for ongoing health care.    I appreciate the opportunity to be involved in this patients care. Please feel free to contact me if there were any questions about this consultation.    Patient is pending optimization    Recent vaginal bleeding BRB for the past 3 days  She reports being perimenopausal  Referral placed to GYN for evalaution       I spent a total of 40 minutes on the day of the visit.This includes face to face time and non-face to face time preparing to see the patient (eg, review of tests), obtaining and/or reviewing separately obtained history, documenting clinical information in the electronic or other health record, independently interpreting results and communicating results to the patient/family/caregiver, or care coordinator.      Bhavin Amaya NP  Perioperative Medicine  [facility]   Pager [phone]

## 2025-06-24 NOTE — PATIENT INSTRUCTIONS
.Your surgery has been scheduled for:_______________7/17/25___________________________    You should report to:  ____[facility], located on the [place] side of the first floor of the           [facility] ([phone])  ____[facility], located on the [facility] side of the            Second floor of the [facility] ([phone])  ____3rd Floor [facility] located on the [facility] side of the [facility] [phone]  __X__[facility]: located at [address] [place] [place] [address]. [address].     Please Note   Tell your doctor if you take Aspirin, products containing Aspirin, herbal medications  or blood thinners, such as Coumadin, Ticlid, or Plavix.  (Consult your provider regarding holding or stopping before surgery).  Arrange for someone to drive you home following surgery.  You will not be allowed to leave the surgical facility alone or drive yourself home following sedation and anesthesia.    Before Surgery  Stop taking all herbal medications, vitamins, and supplements 7 days prior to surgery  No Motrin/Advil (Ibuprofen) 7 days before surgery  No Aleve (Naproxen) 7 days before surgery   No Goody's/BC Powder 7 days before surgery  Refrain from drinking alcoholic beverages for 24 hours before and after surgery  Stop or limit smoking at least 24 hours prior to surgery  You may take Tylenol for pain    Night before Surgery  Do not eat or drink after midnight  Take a shower or bath (shower is recommended).  Bathe with Hibiclens soap or an antibacterial soap from the neck down.  If not supplied by your surgeon, hibiclens soap will need to be purchased over the counter in pharmacy.  Rinse soap off thoroughly.  Shampoo your hair with your regular shampoo    The Day of Surgery  Take another bath or shower with hibiclens or any antibacterial soap, to reduce the chance of infection.  Take heart  and blood pressure medications with a small sip of water, as advised by the perioperative team.  Do not take fluid pills  You may brush your teeth and rinse your mouth, but do not swallow any additional water.   Do not apply perfumes, powder, body lotions or deodorant on the day of surgery.  Nail polish should be removed.  Do not wear makeup or moisturizer  Wear comfortable clothes, such as a button front shirt and loose fitting pants.  Leave all jewelry, including body piercings, and valuables at home.    Bring any devices you will need after surgery such as crutches or canes.  If you have sleep apnea, please bring your CPAP machine  In the event that your physical condition changes including the onset of a cold or respiratory illness, or if you have to delay or cancel your surgery, please notify your surgeon.

## 2025-06-27 ENCOUNTER — LAB VISIT (OUTPATIENT)
Dept: LAB | Facility: HOSPITAL | Age: 55
End: 2025-06-27
Payer: COMMERCIAL

## 2025-06-27 ENCOUNTER — OFFICE VISIT (OUTPATIENT)
Dept: DERMATOLOGY | Facility: CLINIC | Age: 55
End: 2025-06-27
Payer: COMMERCIAL

## 2025-06-27 DIAGNOSIS — L65.9 HAIR LOSS: ICD-10-CM

## 2025-06-27 DIAGNOSIS — L65.9 ALOPECIA: Primary | ICD-10-CM

## 2025-06-27 LAB
FERRITIN SERPL-MCNC: 54 NG/ML (ref 20–300)
VIT B12 SERPL-MCNC: 397 PG/ML (ref 210–950)

## 2025-06-27 PROCEDURE — 82728 ASSAY OF FERRITIN: CPT

## 2025-06-27 PROCEDURE — 82652 VIT D 1 25-DIHYDROXY: CPT

## 2025-06-27 PROCEDURE — 99999 PR PBB SHADOW E&M-EST. PATIENT-LVL III: CPT | Mod: PBBFAC,,, | Performed by: PHYSICIAN ASSISTANT

## 2025-06-27 PROCEDURE — 36415 COLL VENOUS BLD VENIPUNCTURE: CPT

## 2025-06-27 PROCEDURE — 82607 VITAMIN B-12: CPT

## 2025-06-27 PROCEDURE — 84630 ASSAY OF ZINC: CPT

## 2025-06-27 RX ORDER — KETOCONAZOLE 20 MG/ML
SHAMPOO, SUSPENSION TOPICAL
Qty: 120 ML | Refills: 5 | Status: SHIPPED | OUTPATIENT
Start: 2025-06-27

## 2025-06-27 NOTE — PATIENT INSTRUCTIONS
Androgenetic Alopecia in Females (Female Pattern Hair Loss)  What is it?  A nonscarring hair loss that primarily involves the frontal scalp and vertex of the scalp. Untreated, FPHL results in a slow, progressive decline in the density of scalp hair, but does not typically progress to baldness.  What causes it?  Androgens, also known as male sex hormones, play a critical role. Dihydrotestosterone is the key androgen involved in the induction and promotion of MPHL.   Testosterone -> converted by 5-alpha reductase -> Dihydrotestosterone   Genetic component   Treatment options:  Topical Minoxidil (5% foam once daily application)- must allow 2 hours to dry before bed  Foam should be applied to the scalp and not the hair.   Shedding may occur during the early course of treatment. This usually resolves in 2 months.  Must continue for 6 months  If treatment is stopped, regrowth will be lost often over the course of several months.   Spironolactone (100-200 mg per day)  Decreases the production of hormones that contribute to androgenetic alopecia  Adverse effects: Headache, decreased libido, menstrual irregularities, orthostatic hypotension, fatigue, and hyperkalemia. This drug is teratogenic and should be avoided in pregnancy. Proper birth control must be used if premenopausal.   Potassium will be monitored in patients over 45.  Finasteride (2.5-5 mg per day)  A type 2 5-alpha-reductase inhibitor  Adverse effects: Usually well-tolerated in female patients. This drug is teratogenic and should be avoided in pregnancy. Proper birth control must be used if premenopausal.  Oral Minoxidil (1/4 of 2.5 mg tablet daily tablet slowly increasing to 2.5 mg over time if tolerated well)  Less data as far as the efficacy and dosing   Adverse effects: headache, unusual hair growth, chest pain, heart palpitations, difficulty breathing, dizziness, blistering of the skin   Skin Medicinals Compound solution  Shampoo  Ketoconazole 2% shampoo-  Rx  Has antiandrogenic properties at the [facility] shampoo- OTC  Supplements/oils   Vivascal or Nutrafol- talk to PCP before starting  Rosemary oil -twice daily  Pumpkin seed oil 400 mg daily- talk to PCP before starting   Things to Know  The course of the disease is variable, but tends to progress slowly over the course of many years.   Our goal is preserve the hair that is still present, prevent any further hair loss, and to incite regrowth.   Hair growth is a slow process. Stick with the treatment regimen! Stopping the treatment regimen too soon may not allow us to accurately assess what is working and what's not.       Alopecia- rule out nutritional deficiencies, but most likely androgenetic   -     Ambulatory referral/consult to Dermatology  -     Ferritin; Future; Expected date: 06/27/2025  -     Vitamin B12; Future; Expected date: 06/27/2025  -     Calcitriol; Future; Expected date: 06/27/2025  -     Zinc; Future; Expected date: 06/27/2025  -     ketoconazole (NIZORAL) 2 % shampoo; Wash hair with medicated shampoo at least 2x/month- let sit on scalp at least 5 minutes prior to rinsing  Dispense: 120 mL; Refill: 5    6/27/2025 Topical Compound(Topical Compound) 30ml Active Minoxidil: 6%  Finasteride: 0.1%  Applicator: Padded Sponge Top  Vehicle: Solution 4 N/A  View/Reorder Script     Continue Nutrafol.       Lab Results   Component Value Date    WBC 5.59 06/11/2025    HGB 13.0 06/11/2025    HCT 41.7 06/11/2025    MCV 93 06/11/2025     06/11/2025       Lab Results   Component Value Date    TSH 2.002 03/14/2025     Lab Results   Component Value Date    [id number] 223 03/14/2025         Follow up in about 6 months (around 12/27/2025).

## 2025-06-27 NOTE — PROGRESS NOTES
Subjective:      Patient ID:  Naima Jeffries is a 54 y.o. female who presents for   Chief Complaint   Patient presents with    Hair Loss     Patient last seen 3/7/2022 by Dr. Kaushal OLIVARES.    Patient here today for hair loss. Started in 2022-23. Thinks it was due to stress from divorce. Denies itching, bumps, flaking, tenderness. Denies personal hx of autoimmune disease or breast cancer. Father's sister and cousin had breast cancer. Takes multi-B vitamin.    Patient with new area of concern:   Location: scalp  Duration: several years  S/S: hair loss  Previous treatments: Biotin, Minoxidil, Nutrafol (makes hair feel costa, currently)        Review of Systems   Constitutional:  Positive for weight loss (Since moving to [place]). Negative for fever and fatigue.   Gastrointestinal:  Negative for nausea and vomiting.   Skin:  Negative for itching, rash and dry skin.       Objective:   Physical Exam   Constitutional: She appears well-developed and well-nourished. No distress.   Neurological: She is alert and oriented to person, place, and time. She is not disoriented.   Psychiatric: She has a normal mood and affect.   Skin:   Areas Examined (abnormalities noted in diagram):   Scalp / Hair Palpated and Inspected                          Diagram Legend     Erythematous scaling macule/papule c/w actinic keratosis       Vascular papule c/w angioma      Pigmented verrucoid papule/plaque c/w seborrheic keratosis      Yellow umbilicated papule c/w sebaceous hyperplasia      Irregularly shaped tan macule c/w lentigo     1-2 mm smooth white papules consistent with Milia      Movable subcutaneous cyst with punctum c/w epidermal inclusion cyst      Subcutaneous movable cyst c/w pilar cyst      Firm pink to brown papule c/w dermatofibroma      Pedunculated fleshy papule(s) c/w skin tag(s)      Evenly pigmented macule c/w junctional nevus     Mildly variegated pigmented, slightly irregular-bordered macule c/w mildly atypical  nevus      Flesh colored to evenly pigmented papule c/w intradermal nevus       Pink pearly papule/plaque c/w basal cell carcinoma      Erythematous hyperkeratotic cursted plaque c/w SCC      Surgical scar with no sign of skin cancer recurrence      Open and closed comedones      Inflammatory papules and pustules      Verrucoid papule consistent consistent with wart     Erythematous eczematous patches and plaques     Dystrophic onycholytic nail with subungual debris c/w onychomycosis     Umbilicated papule    Erythematous-base heme-crusted tan verrucoid plaque consistent with inflamed seborrheic keratosis     Erythematous Silvery Scaling Plaque c/w Psoriasis     See annotation      Assessment / Plan:        Alopecia- rule out nutritional deficiencies, but most likely androgenetic   -     Ambulatory referral/consult to Dermatology  -     Ferritin; Future; Expected date: 06/27/2025  -     Vitamin B12; Future; Expected date: 06/27/2025  -     Calcitriol; Future; Expected date: 06/27/2025  -     Zinc; Future; Expected date: 06/27/2025  -     ketoconazole (NIZORAL) 2 % shampoo; Wash hair with medicated shampoo at least 2x/month- let sit on scalp at least 5 minutes prior to rinsing  Dispense: 120 mL; Refill: 5    6/27/2025 Topical Compound(Topical Compound) 30ml Active Minoxidil: 6%  Finasteride: 0.1%  Applicator: Padded Sponge Top  Vehicle: Solution 4 N/A  View/Reorder Script     Continue Nutrafol.       Lab Results   Component Value Date    WBC 5.59 06/11/2025    HGB 13.0 06/11/2025    HCT 41.7 06/11/2025    MCV 93 06/11/2025     06/11/2025       Lab Results   Component Value Date    TSH 2.002 03/14/2025     Lab Results   Component Value Date    [id number] 223 03/14/2025         Follow up in about 6 months (around 12/27/2025).

## 2025-06-30 ENCOUNTER — CLINICAL SUPPORT (OUTPATIENT)
Dept: REHABILITATION | Facility: HOSPITAL | Age: 55
End: 2025-06-30
Payer: COMMERCIAL

## 2025-06-30 ENCOUNTER — OFFICE VISIT (OUTPATIENT)
Dept: OBSTETRICS AND GYNECOLOGY | Facility: CLINIC | Age: 55
End: 2025-06-30
Payer: COMMERCIAL

## 2025-06-30 ENCOUNTER — PATIENT MESSAGE (OUTPATIENT)
Dept: ORTHOPEDICS | Facility: CLINIC | Age: 55
End: 2025-06-30
Payer: COMMERCIAL

## 2025-06-30 VITALS
WEIGHT: 166.44 LBS | DIASTOLIC BLOOD PRESSURE: 84 MMHG | SYSTOLIC BLOOD PRESSURE: 120 MMHG | BODY MASS INDEX: 26.07 KG/M2

## 2025-06-30 DIAGNOSIS — N93.9 VAGINAL BLEEDING: Primary | ICD-10-CM

## 2025-06-30 DIAGNOSIS — M25.69 DECREASED RANGE OF MOTION OF TRUNK AND BACK: ICD-10-CM

## 2025-06-30 DIAGNOSIS — R29.898 WEAKNESS OF RIGHT SHOULDER: ICD-10-CM

## 2025-06-30 DIAGNOSIS — R29.898 WEAKNESS OF RIGHT HIP: Primary | ICD-10-CM

## 2025-06-30 LAB
B-HCG UR QL: NEGATIVE
CTP QC/QA: YES
W VITAMIN D, 1, 25-DIHYDROXY: 29 PG/ML
W ZINC: 82 UG/DL

## 2025-06-30 PROCEDURE — 88305 TISSUE EXAM BY PATHOLOGIST: CPT | Mod: TC | Performed by: OBSTETRICS & GYNECOLOGY

## 2025-06-30 PROCEDURE — 1160F RVW MEDS BY RX/DR IN RCRD: CPT | Mod: CPTII,S$GLB,, | Performed by: OBSTETRICS & GYNECOLOGY

## 2025-06-30 PROCEDURE — 3074F SYST BP LT 130 MM HG: CPT | Mod: CPTII,S$GLB,, | Performed by: OBSTETRICS & GYNECOLOGY

## 2025-06-30 PROCEDURE — 97110 THERAPEUTIC EXERCISES: CPT

## 2025-06-30 PROCEDURE — 1159F MED LIST DOCD IN RCRD: CPT | Mod: CPTII,S$GLB,, | Performed by: OBSTETRICS & GYNECOLOGY

## 2025-06-30 PROCEDURE — 99213 OFFICE O/P EST LOW 20 MIN: CPT | Mod: 25,S$GLB,, | Performed by: OBSTETRICS & GYNECOLOGY

## 2025-06-30 PROCEDURE — 88305 TISSUE EXAM BY PATHOLOGIST: CPT | Mod: 26,,, | Performed by: PATHOLOGY

## 2025-06-30 PROCEDURE — 99999 PR PBB SHADOW E&M-EST. PATIENT-LVL III: CPT | Mod: PBBFAC,,, | Performed by: OBSTETRICS & GYNECOLOGY

## 2025-06-30 PROCEDURE — 3008F BODY MASS INDEX DOCD: CPT | Mod: CPTII,S$GLB,, | Performed by: OBSTETRICS & GYNECOLOGY

## 2025-06-30 PROCEDURE — 58100 BIOPSY OF UTERUS LINING: CPT | Mod: S$GLB,,, | Performed by: OBSTETRICS & GYNECOLOGY

## 2025-06-30 PROCEDURE — 3044F HG A1C LEVEL LT 7.0%: CPT | Mod: CPTII,S$GLB,, | Performed by: OBSTETRICS & GYNECOLOGY

## 2025-06-30 PROCEDURE — 97140 MANUAL THERAPY 1/> REGIONS: CPT

## 2025-06-30 PROCEDURE — 81025 URINE PREGNANCY TEST: CPT | Mod: S$GLB,,, | Performed by: OBSTETRICS & GYNECOLOGY

## 2025-06-30 PROCEDURE — 3079F DIAST BP 80-89 MM HG: CPT | Mod: CPTII,S$GLB,, | Performed by: OBSTETRICS & GYNECOLOGY

## 2025-06-30 NOTE — PROGRESS NOTES
Chief Complaint   Patient presents with    Vaginal Bleeding       HPI:   54 y.o.  here today for vaginal bleeding. Hasn't been a full 12 months without a cycle. Perimenopausal. Has had bleeding after sex before but not a current problem. Has been using Vivelle Dot 0.025 mg twice weekly plus oral Prometrium 100 mg. Was prescribed semaglutide but has not recently taken nor does she plan to continue it due to side effects. HRT is managing her hot flashes and night sweats. She denies menstrual cramping, new partners, vaginal discharge, odor, or urinary complaints.     About to have right hip surgery. On .     Labs / Significant Studies  Pap (2025): NILM/HPV neg  MMG (2025): BIRADS-1     Past Medical History:   Diagnosis Date    Abnormal Pap smear of cervix     Anxiety     Arthritis     Candida glabrata infection 2022    Counseling for hormone replacement therapy 2021    Depression     Fever blister     IUD (intrauterine device) in place 2020    Mirena 2015      S/P D&C (status post dilation and curettage)     Vaginal discharge 2022     Past Surgical History:   Procedure Laterality Date    BREAST BIOPSY Right 2018    benign    COLONOSCOPY N/A 2022    Procedure: COLONOSCOPY;  Surgeon: Akua Haider MD;  Location: [facility] ([address] [place] [address]);  Service: Endoscopy;  Laterality: N/A;  original order from Dr Florian on 21-unusable. new order placed        vaccinated-GT    COLONOSCOPY N/A 2022    Procedure: COLONOSCOPY;  Surgeon: Gael Rico MD;  Location: [facility] ([address] [place] [address]);  Service: Endoscopy;  Laterality: N/A;  old case request unusable, new case request placed  fully vaccinated, instructions emailed to [email or web address]  golytely. pre call complete A.s    EXCISION OF PTERYGIUM Right     EYE SURGERY  Not sure    Pterigium removed    LASER ABLATION OF THE CERVIX       Current Medications[1]  Review of patient's allergies indicates:  No Known  Allergies  OB History    Para Term  AB Living   4 2 1 1 2 2   SAB IAB Ectopic Multiple Live Births   1    2      # Outcome Date GA Lbr Solomon/2nd Weight Sex Type Anes PTL Lv   4 Term 03/25/15    M Vag-Spont   JOE   3 SAB      SAB      2  09/10/11 35w0d   M Vag-Spont   JOE   1 AB              Social History[2]  Family History   Problem Relation Name Age of Onset    Stroke Mother Shania Jeffries     Vision loss Mother Shania Jeffries     Breast cancer Paternal Aunt      Breast cancer Cousin paternal     Colon cancer Neg Hx      Ovarian cancer Neg Hx      Uterine cancer Neg Hx      Cervical cancer Neg Hx      Cancer Neg Hx         Review of Systems   Negative except as in HPI    Physical Exam   Vitals:    25 1429   BP: 120/84     Body mass index is 26.07 kg/m².    Physical Exam  Constitutional:       General: She is not in acute distress.     Appearance: Normal appearance.   HENT:      Head: Normocephalic and atraumatic.   Eyes:      Extraocular Movements: Extraocular movements intact.      Pupils: Pupils are equal, round, and reactive to light.   Pulmonary:      Effort: Pulmonary effort is normal.   Musculoskeletal:         General: Normal range of motion.      Cervical back: Normal range of motion.   Neurological:      General: No focal deficit present.      Mental Status: She is alert and oriented to person, place, and time.   Skin:     General: Skin is warm and dry.   Psychiatric:         Mood and Affect: Mood normal.         Behavior: Behavior normal.         Thought Content: Thought content normal.   Vitals reviewed.          Labs reviewed: Pap, HPV, MMG, FSH    ASSESSMENT:   Problem List[3]    PLAN:  Problem List Items Addressed This Visit          Renal/    Vaginal bleeding        Total time spent on this encounter was ***.  This includes preparing to see the patient;  obtaining/reviewing separately obtained history;  performing a medical exam and/or evaluation;    counseling/educating the patient;  ordering medications, tests, of procedures;   referring/communicating with other health care professionals;  EMR documentation;  interpreting/communicating results to the patient;  and/or care coordination.    No follow-ups on file.       Jane Collins MD  Department of Obstetrics & Gynecology  [facility]           [1]   Current Outpatient Medications:     b complex vitamins tablet, Take 1 tablet by mouth once daily., Disp: , Rfl:     EScitalopram oxalate (LEXAPRO) 10 MG tablet, Take 1 tablet (10 mg total) by mouth once daily., Disp: 90 tablet, Rfl: 3    estradioL (VIVELLE-DOT) 0.025 mg/24 hr, Place 1 patch onto the skin twice a week., Disp: 8 patch, Rfl: 11    ketoconazole (NIZORAL) 2 % shampoo, Wash hair with medicated shampoo at least 2x/month- let sit on scalp at least 5 minutes prior to rinsing, Disp: 120 mL, Rfl: 5    magnesium 200 mg Tab, Take by mouth once., Disp: , Rfl:     meloxicam (MOBIC) 15 MG tablet, Take 1 tablet (15 mg total) by mouth once daily. Additional refills should come from primary care provider, Disp: 30 tablet, Rfl: 2    multivitamin with iron (HAIR VITAMINS ORAL), Take by mouth. Nutrafol, Disp: , Rfl:     progesterone (PROMETRIUM) 100 MG capsule, Take 1 capsule (100 mg total) by mouth nightly., Disp: 30 capsule, Rfl: 11    UNABLE TO FIND, medication name: Testosterone 1% Cream Apply 1 click to the upper inner thigh daily, Disp: 30 g, Rfl: 5  [2]   Social History  Tobacco Use    Smoking status: Never     Passive exposure: Never    Smokeless tobacco: Never   Substance Use Topics    Alcohol use: Yes     Comment: 3-4 times per week mixed drinks on we 2-3 wine with dinner 1-2    Drug use: Never   [3]   Patient Active Problem List  Diagnosis    Situational depression    Vaginal atrophy    Weakness of right hip    Decreased range of motion of trunk and back    Weakness of right shoulder    Osteoarthritis of right hip    Vaginal bleeding      coordination.    Follow up if symptoms worsen or fail to improve.       Jane Collins MD  Department of Obstetrics & Gynecology  [facility]           [1]   Current Outpatient Medications:     b complex vitamins tablet, Take 1 tablet by mouth once daily., Disp: , Rfl:     EScitalopram oxalate (LEXAPRO) 10 MG tablet, Take 1 tablet (10 mg total) by mouth once daily., Disp: 90 tablet, Rfl: 3    estradioL (VIVELLE-DOT) 0.025 mg/24 hr, Place 1 patch onto the skin twice a week., Disp: 8 patch, Rfl: 11    ketoconazole (NIZORAL) 2 % shampoo, Wash hair with medicated shampoo at least 2x/month- let sit on scalp at least 5 minutes prior to rinsing, Disp: 120 mL, Rfl: 5    magnesium 200 mg Tab, Take by mouth once., Disp: , Rfl:     meloxicam (MOBIC) 15 MG tablet, Take 1 tablet (15 mg total) by mouth once daily. Additional refills should come from primary care provider, Disp: 30 tablet, Rfl: 2    multivitamin with iron (HAIR VITAMINS ORAL), Take by mouth. Nutrafol, Disp: , Rfl:     progesterone (PROMETRIUM) 100 MG capsule, Take 1 capsule (100 mg total) by mouth nightly., Disp: 30 capsule, Rfl: 11    UNABLE TO FIND, medication name: Testosterone 1% Cream Apply 1 click to the upper inner thigh daily, Disp: 30 g, Rfl: 5  [2]   Social History  Tobacco Use    Smoking status: Never     Passive exposure: Never    Smokeless tobacco: Never   Substance Use Topics    Alcohol use: Yes     Comment: 3-4 times per week mixed drinks on we 2-3 wine with dinner 1-2    Drug use: Never   [3]   Patient Active Problem List  Diagnosis    Situational depression    Vaginal atrophy    Weakness of right hip    Decreased range of motion of trunk and back    Weakness of right shoulder    Osteoarthritis of right hip    Vaginal bleeding

## 2025-06-30 NOTE — PROGRESS NOTES
Outpatient Rehab    Physical Therapy Progress Note    Patient Name: Naima Jeffries  MRN: [id number]  YOB: 1970  Encounter Date: 6/30/2025    Therapy Diagnosis:   Encounter Diagnoses   Name Primary?    Weakness of right hip Yes    Decreased range of motion of trunk and back     Weakness of right shoulder      Physician: Kar Trevino III, *    Physician Orders: Eval and Treat  Medical Diagnosis: Primary osteoarthritis of right hip  Right shoulder pain, unspecified chronicity  Surgical Diagnosis: Not applicable for this Episode   Surgical Date: Not applicable for this Episode    Visit # / Visits Authorized:  7 / 10  Insurance Authorization Period: 5/5/2025 to 12/31/2025  Date of Evaluation: 5/5/2025  Plan of Care Certification: 5/5/2025 to 6/30/2025      PT/PTA:     Number of PTA visits since last PT visit:   Time In: 0910   Time Out: 1000  Total Time (in minutes): 50   Total Billable Time (in minutes): 50    FOTO:  Intake Score:  %  Survey Score 2:  %  Survey Score 3:  %    Precautions:       Subjective   Patient reports continued bilateral hip pain..  Pain reported as 6/10.      Objective        Treatment:     Therapeutic Exercise for 40 minutes:     Bridges blue TB 3 x 10  Sidelying clamshells blue TB 3 sec hold 3 x 10 bilateral  Sidelying hip abduction 3 sec hold 3 x 8 bilateral   Seated hip internal rotation green TB 3 x 8 bilateral   Sit to stands holding 15 lb kettlebell 3 x 8 NPT  Shuttle leg press single LE 37 lbs (1 blk, 1 red) 3 x 10 each side   Lateral walks green TB below knees 10 yards x 4 laps   RDLs with 10 lb KB 3 x 10     Not performed:  Sidelying open book x 20  Prone T's 2 x 8  Prone Y's 2 x 8  Wall slide + lift off yellow TB 3 x 8  90/90 ER walkout green TB 1 x 10  Hip hinge with dowel 2 x 10     Manual Therapy for 10 minutes:     Bilateral LE long axis distraction grade III-IV  Bilateral hip inferior and lateral glides with mobilization belt, grade III-IV  Right hip prone P/A  blake, grade III-IV - NP    Time Entry(in minutes):  Manual Therapy Time Entry: 10  Therapeutic Exercise Time Entry: 40    Assessment & Plan   Assessment:       Patient continues to respond well to bilateral hip joint mobs, distraction, and PROM. She continues to tolerate therapeutic exercises well without c/o increased symptoms. Will continue with strengthening exercises to prepare for Right DEANDRE on 7/17/2025.     The patient will continue to benefit from skilled outpatient physical therapy in order to address the deficits listed in the problem list on the initial evaluation, provide patient and family education, and maximize the patients level of independence in the home and community environments.     The patient's spiritual, cultural, and educational needs were considered, and the patient is agreeable to the plan of care and goals.         Plan:  Continue per established PT plan of care.     Goals:   Active       Long Term Goals       Long Term Goals       Start:  05/13/25    Expected End:  06/30/25       1.     Patient will demonstrate bilateral cervical rotation AROM at least 65 degrees.   2.     Patient will demonstrate Right hip strength 5/5.  3.     Pain Rating at Worst: 2/10 or less to improve overall Quality of Life.    4.     Patient will meet predicted functional outcome (FOTO) score: 10% improvement in functional ability or greater to increase self-perceived functional ability.  5.     Patient will be independent with updated HEP at discharge.               Short Term Goals       Short Term Goals       Start:  05/13/25    Expected End:  05/30/25       1.     Patient will be independent with HEP to supplement therapy in return to maximal function.  2.     Pain rating at Worst: 5/10 or less for improved tolerance with walking.   3.     Patient will demonstrate Right hip strength 4+/5.                Mirela Blunt, PT

## 2025-07-01 ENCOUNTER — RESULTS FOLLOW-UP (OUTPATIENT)
Dept: DERMATOLOGY | Facility: CLINIC | Age: 55
End: 2025-07-01

## 2025-07-02 LAB
ESTROGEN SERPL-MCNC: NORMAL PG/ML
INSULIN SERPL-ACNC: NORMAL U[IU]/ML
LAB AP CLINICAL INFORMATION: NORMAL
LAB AP DIAGNOSIS CATEGORY: NORMAL
LAB AP GROSS DESCRIPTION: NORMAL
LAB AP PERFORMING LOCATION(S): NORMAL
LAB AP REPORT FOOTNOTES: NORMAL

## 2025-07-03 ENCOUNTER — RESULTS FOLLOW-UP (OUTPATIENT)
Dept: OBSTETRICS AND GYNECOLOGY | Facility: CLINIC | Age: 55
End: 2025-07-03

## 2025-07-06 NOTE — ASSESSMENT & PLAN NOTE
Likely still perimenopausal as patient has never been a full 12 months without a cycle and FSH ranging between 10s-30s since 2021. However due to use of HRT, EMBx performed today without difficulty and will assess TVUS for EMS thickness and other possible structural causes of bleeding. Can consider increasing Prometrium to 200 mg daily or switching to oral provera for better control of bleeding, however this may effect efficacy of HRT.     Will contact patient w/ results and next steps.

## 2025-07-06 NOTE — PROCEDURES
Endometrial biopsy    Date/Time: 6/30/2025 2:15 PM    Performed by: Jane Collins MD  Authorized by: Jane Collins MD    Consent:     Consent given by:  Patient    Patient questions answered: yes      Patient agrees, verbalizes understanding, and wants to proceed: yes    Indication:     Indications: Other disorder of menstruation and other abnormal bleeding from female genital tract    Pre-procedure:     Pre-procedure timeout performed: yes    Procedure:     Procedure: endometrial biopsy with Pipelle      Cervix cleaned and prepped: yes      A paracervical block was performed: no      An intracervical block was performed: no      The cervix was dilated using cervical dilators: no      Use of single-tooth tenaculum: yes      Uterus sound depth (cm):  7    Curettes used:  1    Specimen collected: specimen collected and sent to pathology      Patient tolerated procedure well with no complications: yes      Procedure:  Endometrial biopsy    Diagnosis: Perimenopausal bleeding, on HRT    The risks, benefits, and alternatives were discussed prior to the procedure. Patient signed consents.    UPT: Negative    Procedure note:  The speculum was placed and the cervix prepped with hibiclens.  The anterior lip of the cervix was grasped with a single tooth tenaculum.  The endometrial pipelle was inserted to a depth of 7 cm and an adequate amount of tissue was obtained with one pass.  The instruments were removed. Pressure was applied for hemostasis.  The specimen was sent to pathology for evaluation.  There were no complications.    Assessment and Plan:  Vaginal bleeding  -     Ambulatory referral/consult to Gynecology  -     US Pelvis Comp with Transvag NON-OB (xpd; Future; Expected date: 06/30/2025  -     Specimen to Pathology Gynecology and Obstetrics  -     POCT Urine Pregnancy    Other orders  -     Endometrial biopsy      Ibuprofen 600 mg every 6 hours PRN cramping.  Call the office for heavy vaginal bleeding,  fever, nausea, vomiting, or severe lower abdominal pain.    Will contact patient with results.      Jane Collins MD  Department of Obstetrics & Gynecology  [facility]

## 2025-07-08 ENCOUNTER — CLINICAL SUPPORT (OUTPATIENT)
Dept: REHABILITATION | Facility: HOSPITAL | Age: 55
End: 2025-07-08
Payer: COMMERCIAL

## 2025-07-08 DIAGNOSIS — R29.898 WEAKNESS OF RIGHT HIP: Primary | ICD-10-CM

## 2025-07-08 DIAGNOSIS — M25.69 DECREASED RANGE OF MOTION OF TRUNK AND BACK: ICD-10-CM

## 2025-07-08 DIAGNOSIS — R29.898 WEAKNESS OF RIGHT SHOULDER: ICD-10-CM

## 2025-07-08 PROCEDURE — 97110 THERAPEUTIC EXERCISES: CPT

## 2025-07-08 PROCEDURE — 97140 MANUAL THERAPY 1/> REGIONS: CPT

## 2025-07-08 NOTE — PROGRESS NOTES
Outpatient Rehab    Physical Therapy Visit     Patient Name: Naima Jeffries  MRN: [id number]  YOB: 1970  Encounter Date: 7/8/2025    Therapy Diagnosis:   Encounter Diagnoses   Name Primary?    Weakness of right hip Yes    Decreased range of motion of trunk and back     Weakness of right shoulder      Physician: Kar Trevino III, *    Physician Orders: Eval and Treat  Medical Diagnosis: Primary osteoarthritis of right hip  Right shoulder pain, unspecified chronicity  Surgical Diagnosis: Not applicable for this Episode   Surgical Date: Not applicable for this Episode  Days Since Last Surgery: Not applicable for this Episode    Visit # / Visits Authorized:  8 / 10  Insurance Authorization Period: 5/5/2025 to 12/31/2025  Date of Evaluation: 5/5/2025  Plan of Care Certification: 5/5/2025 to 6/30/2025      PT/PTA:     Number of PTA visits since last PT visit:   Time In: 0905   Time Out: 0955  Total Time (in minutes): 50   Total Billable Time (in minutes): 50    FOTO:  Intake Score:  %  Survey Score 2:  %  Survey Score 3:  %    Precautions:       Subjective   Patient reports she didn't feel much hip pain while on vacation, but it has been bothering her again the last couple days. She is scheduled for Right DEANDRE on 7/17/25..  Pain reported as 5/10.      Objective          Treatment:     Therapeutic Exercise for 35 minutes:     Treadmill 5 minutes   Bridges blue TB 3 x 10  Sidelying clamshells blue TB 3 sec hold 3 x 10 bilateral  Sidelying hip abduction 3 sec hold 3 x 8 bilateral   Seated hip internal rotation green TB 3 x 8 bilateral   Sit to stands holding 15 lb kettlebell 3 x 8 NPT  Shuttle leg press single LE 37 lbs (1 blk, 1 red) 3 x 10 each side   Lateral walks green TB below knees 10 yards x 4 laps     Not performed today:  RDLs with 10 lb KB 3 x 10     Manual Therapy for 15 minutes:     Bilateral LE long axis distraction grade III-IV  Bilateral hip inferior and lateral glides with mobilization belt,  grade III-IV  Right hip prone P/A glides, grade III-IV - NP    Time Entry(in minutes):  Manual Therapy Time Entry: 15  Therapeutic Exercise Time Entry: 35    Assessment & Plan   Assessment:     Patient is scheduled for Right DEANDRE on 7/17/2025. She has tolerated prehab PT treatment well to prepare for surgery. Instructed her to continue with home exercise program until surgery date to maintain ROM and strength gains. She reports that she may have home health PT for the first 1-2 weeks following surgery and then will return to outpatient PT following.     The patient will continue to benefit from skilled outpatient physical therapy in order to address the deficits listed in the problem list on the initial evaluation, provide patient and family education, and maximize the patients level of independence in the home and community environments.     The patient's spiritual, cultural, and educational needs were considered, and the patient is agreeable to the plan of care and goals.         Plan:  Patient scheduled for Right DEANDRE on 7/17/25 and will return to PT following surgery.     Goals:   Active       Long Term Goals       Long Term Goals       Start:  05/13/25    Expected End:  06/30/25       1.     Patient will demonstrate bilateral cervical rotation AROM at least 65 degrees.   2.     Patient will demonstrate Right hip strength 5/5.  3.     Pain Rating at Worst: 2/10 or less to improve overall Quality of Life.    4.     Patient will meet predicted functional outcome (FOTO) score: 10% improvement in functional ability or greater to increase self-perceived functional ability.  5.     Patient will be independent with updated HEP at discharge.               Short Term Goals       Short Term Goals       Start:  05/13/25    Expected End:  05/30/25       1.     Patient will be independent with HEP to supplement therapy in return to maximal function.  2.     Pain rating at Worst: 5/10 or less for improved tolerance with  walking.   3.     Patient will demonstrate Right hip strength 4+/5.                Mirela Blunt, PT

## 2025-07-09 ENCOUNTER — HOSPITAL ENCOUNTER (OUTPATIENT)
Dept: RADIOLOGY | Facility: HOSPITAL | Age: 55
Discharge: HOME | End: 2025-07-09
Attending: OBSTETRICS & GYNECOLOGY
Payer: COMMERCIAL

## 2025-07-09 DIAGNOSIS — N93.9 VAGINAL BLEEDING: ICD-10-CM

## 2025-07-09 PROCEDURE — 76830 TRANSVAGINAL US NON-OB: CPT | Mod: TC

## 2025-07-09 PROCEDURE — 76830 TRANSVAGINAL US NON-OB: CPT | Mod: 26,,, | Performed by: RADIOLOGY

## 2025-07-09 PROCEDURE — 76856 US EXAM PELVIC COMPLETE: CPT | Mod: 26,,, | Performed by: RADIOLOGY

## 2025-07-15 ENCOUNTER — TELEPHONE (OUTPATIENT)
Dept: ORTHOPEDICS | Facility: CLINIC | Age: 55
End: 2025-07-15
Payer: COMMERCIAL

## 2025-07-15 DIAGNOSIS — Z96.641 S/P HIP REPLACEMENT, RIGHT: Primary | ICD-10-CM

## 2025-07-15 RX ORDER — MULTIVITAMIN
1 TABLET ORAL DAILY
Qty: 14 TABLET | Refills: 0 | Status: SHIPPED | OUTPATIENT
Start: 2025-07-15

## 2025-07-15 RX ORDER — ARGIN/GLUT/CAHMB/COLLAG/MV-MIN 7G-7G-1.5G
1 POWDER IN PACKET (EA) ORAL DAILY
Qty: 14 PACKET | Refills: 0 | Status: SHIPPED | OUTPATIENT
Start: 2025-07-15

## 2025-07-15 RX ORDER — PANTOPRAZOLE SODIUM 40 MG/1
40 TABLET, DELAYED RELEASE ORAL DAILY
Qty: 30 TABLET | Refills: 0 | Status: SHIPPED | OUTPATIENT
Start: 2025-07-15 | End: 2025-08-17

## 2025-07-15 RX ORDER — ASCORBIC ACID 500 MG
1000 TABLET ORAL 2 TIMES DAILY
Qty: 56 TABLET | Refills: 0 | Status: SHIPPED | OUTPATIENT
Start: 2025-07-15 | End: 2025-08-01

## 2025-07-15 RX ORDER — METHOCARBAMOL 750 MG/1
750 TABLET, FILM COATED ORAL 4 TIMES DAILY PRN
Qty: 40 TABLET | Refills: 0 | Status: SHIPPED | OUTPATIENT
Start: 2025-07-15 | End: 2025-07-28

## 2025-07-15 RX ORDER — LACTOSE-REDUCED FOOD
LIQUID (ML) ORAL
Qty: 3318 ML | Refills: 0 | Status: SHIPPED | OUTPATIENT
Start: 2025-07-15

## 2025-07-15 RX ORDER — DEXTROMETHORPHAN HYDROBROMIDE, GUAIFENESIN 5; 100 MG/5ML; MG/5ML
650 LIQUID ORAL EVERY 8 HOURS
Qty: 120 TABLET | Refills: 0 | Status: SHIPPED | OUTPATIENT
Start: 2025-07-15

## 2025-07-15 RX ORDER — CEFADROXIL 500 MG/1
500 CAPSULE ORAL EVERY 12 HOURS
Qty: 14 CAPSULE | Refills: 0 | Status: SHIPPED | OUTPATIENT
Start: 2025-07-15 | End: 2025-07-25

## 2025-07-15 RX ORDER — CELECOXIB 200 MG/1
200 CAPSULE ORAL DAILY
Qty: 30 CAPSULE | Refills: 0 | Status: SHIPPED | OUTPATIENT
Start: 2025-07-15

## 2025-07-15 RX ORDER — ASPIRIN 81 MG/1
81 TABLET ORAL 2 TIMES DAILY
Qty: 60 TABLET | Refills: 0 | Status: SHIPPED | OUTPATIENT
Start: 2025-07-15 | End: 2025-08-17

## 2025-07-15 RX ORDER — OXYCODONE HYDROCHLORIDE 5 MG/1
TABLET ORAL
Qty: 30 TABLET | Refills: 0 | Status: SHIPPED | OUTPATIENT
Start: 2025-07-15

## 2025-07-15 RX ORDER — POLYETHYLENE GLYCOL 3350 17 G/17G
17 POWDER, FOR SOLUTION ORAL DAILY
Qty: 119 G | Refills: 0 | Status: SHIPPED | OUTPATIENT
Start: 2025-07-15

## 2025-07-15 NOTE — TELEPHONE ENCOUNTER
I called the patient today regarding surgery on 7/17/2025 with Dr. Kar Trevino. I informed the patient that her surgery will be at  [facility] ([facility] [place] [address]). I informed the patient they must arrive at 7:30am and their surgery will start at 9:30am.     I reminded the patient that they cannot eat or drink after midnight, the night before surgery.      I reminded the patient to be careful of their skin over the next few days to make sure they do not get any cuts, scratches or scrapes.    The patient verbalized understanding and has no further questions.

## 2025-07-16 ENCOUNTER — ANESTHESIA EVENT (OUTPATIENT)
Dept: SURGERY | Facility: HOSPITAL | Age: 55
End: 2025-07-16
Payer: COMMERCIAL

## 2025-07-17 ENCOUNTER — ANESTHESIA (OUTPATIENT)
Dept: SURGERY | Facility: HOSPITAL | Age: 55
End: 2025-07-17
Payer: COMMERCIAL

## 2025-07-17 ENCOUNTER — HOSPITAL ENCOUNTER (OUTPATIENT)
Facility: HOSPITAL | Age: 55
Discharge: HOME | End: 2025-07-18
Attending: ORTHOPAEDIC SURGERY | Admitting: ORTHOPAEDIC SURGERY
Payer: COMMERCIAL

## 2025-07-17 DIAGNOSIS — Z96.641 S/P HIP REPLACEMENT, RIGHT: ICD-10-CM

## 2025-07-17 LAB
B-HCG UR QL: NEGATIVE
CTP QC/QA: YES

## 2025-07-17 PROCEDURE — 97535 SELF CARE MNGMENT TRAINING: CPT

## 2025-07-17 PROCEDURE — 37000008 HC ANESTHESIA 1ST 15 MINUTES: Performed by: ORTHOPAEDIC SURGERY

## 2025-07-17 PROCEDURE — 97530 THERAPEUTIC ACTIVITIES: CPT

## 2025-07-17 PROCEDURE — 25000003 PHARM REV CODE 250: Performed by: ORTHOPAEDIC SURGERY

## 2025-07-17 PROCEDURE — 99900035 HC TECH TIME PER 15 MIN (STAT)

## 2025-07-17 PROCEDURE — 63600175 PHARM REV CODE 636 W HCPCS: Performed by: NURSE ANESTHETIST, CERTIFIED REGISTERED

## 2025-07-17 PROCEDURE — 63600175 PHARM REV CODE 636 W HCPCS

## 2025-07-17 PROCEDURE — 97162 PT EVAL MOD COMPLEX 30 MIN: CPT

## 2025-07-17 PROCEDURE — 25000003 PHARM REV CODE 250: Performed by: STUDENT IN AN ORGANIZED HEALTH CARE EDUCATION/TRAINING PROGRAM

## 2025-07-17 PROCEDURE — 36000710: Performed by: ORTHOPAEDIC SURGERY

## 2025-07-17 PROCEDURE — 37000009 HC ANESTHESIA EA ADD 15 MINS: Performed by: ORTHOPAEDIC SURGERY

## 2025-07-17 PROCEDURE — 94799 UNLISTED PULMONARY SVC/PX: CPT

## 2025-07-17 PROCEDURE — 25000003 PHARM REV CODE 250

## 2025-07-17 PROCEDURE — 27130 TOTAL HIP ARTHROPLASTY: CPT | Mod: RT,,, | Performed by: ORTHOPAEDIC SURGERY

## 2025-07-17 PROCEDURE — 63600175 PHARM REV CODE 636 W HCPCS: Performed by: ORTHOPAEDIC SURGERY

## 2025-07-17 PROCEDURE — 71000039 HC RECOVERY, EACH ADD'L HOUR: Performed by: ORTHOPAEDIC SURGERY

## 2025-07-17 PROCEDURE — 97116 GAIT TRAINING THERAPY: CPT

## 2025-07-17 PROCEDURE — C1776 JOINT DEVICE (IMPLANTABLE): HCPCS | Performed by: ORTHOPAEDIC SURGERY

## 2025-07-17 PROCEDURE — 71000033 HC RECOVERY, INTIAL HOUR: Performed by: ORTHOPAEDIC SURGERY

## 2025-07-17 PROCEDURE — 63600175 PHARM REV CODE 636 W HCPCS: Performed by: STUDENT IN AN ORGANIZED HEALTH CARE EDUCATION/TRAINING PROGRAM

## 2025-07-17 PROCEDURE — 25000003 PHARM REV CODE 250: Performed by: NURSE ANESTHETIST, CERTIFIED REGISTERED

## 2025-07-17 PROCEDURE — 97165 OT EVAL LOW COMPLEX 30 MIN: CPT

## 2025-07-17 PROCEDURE — 94761 N-INVAS EAR/PLS OXIMETRY MLT: CPT

## 2025-07-17 PROCEDURE — 27201423 OPTIME MED/SURG SUP & DEVICES STERILE SUPPLY: Performed by: ORTHOPAEDIC SURGERY

## 2025-07-17 PROCEDURE — C1713 ANCHOR/SCREW BN/BN,TIS/BN: HCPCS | Performed by: ORTHOPAEDIC SURGERY

## 2025-07-17 PROCEDURE — 36000711: Performed by: ORTHOPAEDIC SURGERY

## 2025-07-17 PROCEDURE — 27100025 HC TUBING, SET FLUID WARMER: Performed by: STUDENT IN AN ORGANIZED HEALTH CARE EDUCATION/TRAINING PROGRAM

## 2025-07-17 DEVICE — EMPHASYS ACETABULAR SHELL THREE-HOLE 48MM CEMENTLESS
Type: IMPLANTABLE DEVICE | Site: HIP | Status: FUNCTIONAL
Brand: EMPHASYS

## 2025-07-17 DEVICE — PINNACLE CANCELLOUS BONE SCREW 6.5MM X 15MM
Type: IMPLANTABLE DEVICE | Site: HIP | Status: FUNCTIONAL
Brand: PINNACLE

## 2025-07-17 DEVICE — ACTIS DUOFIX HIP PROSTHESIS (FEMORAL STEM 12/14 TAPER CEMENTLESS SIZE 2 STD COLLAR)  CE
Type: IMPLANTABLE DEVICE | Site: HIP | Status: FUNCTIONAL
Brand: ACTIS

## 2025-07-17 DEVICE — PINNACLE CANCELLOUS BONE SCREW 6.5MM X 25MM
Type: IMPLANTABLE DEVICE | Site: HIP | Status: FUNCTIONAL
Brand: PINNACLE

## 2025-07-17 DEVICE — EMPHASYS POLYETHYLENE LINER AOX NEUTRAL 48MM 36MM
Type: IMPLANTABLE DEVICE | Site: HIP | Status: FUNCTIONAL
Brand: EMPHASYS

## 2025-07-17 DEVICE — BIOLOX DELTA CERAMIC FEMORAL HEAD +8.5 36MM DIA 12/14 TAPER
Type: IMPLANTABLE DEVICE | Site: HIP | Status: FUNCTIONAL
Brand: BIOLOX DELTA

## 2025-07-17 RX ORDER — PROPOFOL 10 MG/ML
VIAL (ML) INTRAVENOUS
Status: DISCONTINUED | OUTPATIENT
Start: 2025-07-17 | End: 2025-07-17

## 2025-07-17 RX ORDER — PREGABALIN 75 MG/1
75 CAPSULE ORAL
Status: COMPLETED | OUTPATIENT
Start: 2025-07-17 | End: 2025-07-17

## 2025-07-17 RX ORDER — ONDANSETRON HYDROCHLORIDE 2 MG/ML
INJECTION, SOLUTION INTRAVENOUS
Status: DISCONTINUED | OUTPATIENT
Start: 2025-07-17 | End: 2025-07-17

## 2025-07-17 RX ORDER — BETHANECHOL CHLORIDE 10 MG/1
10 TABLET ORAL
Status: DISPENSED | OUTPATIENT
Start: 2025-07-17 | End: 2025-07-17

## 2025-07-17 RX ORDER — MUPIROCIN 20 MG/G
1 OINTMENT TOPICAL 2 TIMES DAILY
Status: DISCONTINUED | OUTPATIENT
Start: 2025-07-17 | End: 2025-07-18 | Stop reason: HOSPADM

## 2025-07-17 RX ORDER — PROCHLORPERAZINE EDISYLATE 5 MG/ML
5 INJECTION INTRAMUSCULAR; INTRAVENOUS EVERY 6 HOURS PRN
Status: DISCONTINUED | OUTPATIENT
Start: 2025-07-17 | End: 2025-07-18 | Stop reason: HOSPADM

## 2025-07-17 RX ORDER — POLYETHYLENE GLYCOL 3350 17 G/17G
17 POWDER, FOR SOLUTION ORAL DAILY PRN
Status: DISCONTINUED | OUTPATIENT
Start: 2025-07-17 | End: 2025-07-18 | Stop reason: HOSPADM

## 2025-07-17 RX ORDER — DEXMEDETOMIDINE HYDROCHLORIDE 100 UG/ML
INJECTION, SOLUTION INTRAVENOUS
Status: DISCONTINUED | OUTPATIENT
Start: 2025-07-17 | End: 2025-07-17

## 2025-07-17 RX ORDER — POLYETHYLENE GLYCOL 3350 17 G/17G
17 POWDER, FOR SOLUTION ORAL DAILY
Status: DISCONTINUED | OUTPATIENT
Start: 2025-07-17 | End: 2025-07-18 | Stop reason: HOSPADM

## 2025-07-17 RX ORDER — METHOCARBAMOL 500 MG/1
1000 TABLET, FILM COATED ORAL ONCE AS NEEDED
Status: COMPLETED | OUTPATIENT
Start: 2025-07-17 | End: 2025-07-17

## 2025-07-17 RX ORDER — KETAMINE HCL IN 0.9 % NACL 50 MG/5 ML
SYRINGE (ML) INTRAVENOUS
Status: DISCONTINUED | OUTPATIENT
Start: 2025-07-17 | End: 2025-07-17

## 2025-07-17 RX ORDER — ACETAMINOPHEN 500 MG
1000 TABLET ORAL EVERY 6 HOURS
Status: DISCONTINUED | OUTPATIENT
Start: 2025-07-17 | End: 2025-07-18 | Stop reason: HOSPADM

## 2025-07-17 RX ORDER — ESCITALOPRAM OXALATE 10 MG/1
10 TABLET ORAL DAILY
Status: DISCONTINUED | OUTPATIENT
Start: 2025-07-18 | End: 2025-07-18 | Stop reason: HOSPADM

## 2025-07-17 RX ORDER — NALOXONE HCL 0.4 MG/ML
0.02 VIAL (ML) INJECTION
Status: DISCONTINUED | OUTPATIENT
Start: 2025-07-17 | End: 2025-07-18 | Stop reason: HOSPADM

## 2025-07-17 RX ORDER — ASPIRIN 81 MG/1
81 TABLET ORAL 2 TIMES DAILY
Status: DISCONTINUED | OUTPATIENT
Start: 2025-07-17 | End: 2025-07-18 | Stop reason: HOSPADM

## 2025-07-17 RX ORDER — METHOCARBAMOL 750 MG/1
750 TABLET, FILM COATED ORAL 3 TIMES DAILY
Status: DISCONTINUED | OUTPATIENT
Start: 2025-07-17 | End: 2025-07-18 | Stop reason: HOSPADM

## 2025-07-17 RX ORDER — ONDANSETRON HYDROCHLORIDE 2 MG/ML
4 INJECTION, SOLUTION INTRAVENOUS EVERY 8 HOURS PRN
Status: DISCONTINUED | OUTPATIENT
Start: 2025-07-17 | End: 2025-07-18 | Stop reason: HOSPADM

## 2025-07-17 RX ORDER — MORPHINE SULFATE 2 MG/ML
2 INJECTION, SOLUTION INTRAMUSCULAR; INTRAVENOUS
Status: DISCONTINUED | OUTPATIENT
Start: 2025-07-17 | End: 2025-07-18 | Stop reason: HOSPADM

## 2025-07-17 RX ORDER — CEFAZOLIN 2 G/1
2 INJECTION, POWDER, FOR SOLUTION INTRAMUSCULAR; INTRAVENOUS
Status: COMPLETED | OUTPATIENT
Start: 2025-07-17 | End: 2025-07-17

## 2025-07-17 RX ORDER — PREGABALIN 75 MG/1
75 CAPSULE ORAL NIGHTLY
Status: DISCONTINUED | OUTPATIENT
Start: 2025-07-17 | End: 2025-07-18 | Stop reason: HOSPADM

## 2025-07-17 RX ORDER — TRANEXAMIC ACID 1 G/10ML
INJECTION, SOLUTION INTRAVENOUS
Status: DISCONTINUED | OUTPATIENT
Start: 2025-07-17 | End: 2025-07-17 | Stop reason: HOSPADM

## 2025-07-17 RX ORDER — ACETAMINOPHEN 500 MG
1000 TABLET ORAL
Status: COMPLETED | OUTPATIENT
Start: 2025-07-17 | End: 2025-07-17

## 2025-07-17 RX ORDER — POVIDONE-IODINE 5 %
SOLUTION, NON-ORAL OPHTHALMIC (EYE)
Status: DISCONTINUED | OUTPATIENT
Start: 2025-07-17 | End: 2025-07-17 | Stop reason: HOSPADM

## 2025-07-17 RX ORDER — OXYCODONE HYDROCHLORIDE 5 MG/1
5 TABLET ORAL
Status: DISCONTINUED | OUTPATIENT
Start: 2025-07-17 | End: 2025-07-18 | Stop reason: HOSPADM

## 2025-07-17 RX ORDER — FAMOTIDINE 10 MG/ML
INJECTION, SOLUTION INTRAVENOUS
Status: DISCONTINUED | OUTPATIENT
Start: 2025-07-17 | End: 2025-07-17

## 2025-07-17 RX ORDER — ROPIVACAINE/EPI/CLONIDINE/KET 2.46-0.005
SYRINGE (ML) INJECTION
Status: DISCONTINUED | OUTPATIENT
Start: 2025-07-17 | End: 2025-07-17 | Stop reason: HOSPADM

## 2025-07-17 RX ORDER — CEFAZOLIN 2 G/1
2 INJECTION, POWDER, FOR SOLUTION INTRAMUSCULAR; INTRAVENOUS
Status: COMPLETED | OUTPATIENT
Start: 2025-07-17 | End: 2025-07-18

## 2025-07-17 RX ORDER — FAMOTIDINE 20 MG/1
20 TABLET, FILM COATED ORAL 2 TIMES DAILY
Status: DISCONTINUED | OUTPATIENT
Start: 2025-07-17 | End: 2025-07-18 | Stop reason: HOSPADM

## 2025-07-17 RX ORDER — OXYCODONE HYDROCHLORIDE 10 MG/1
10 TABLET ORAL
Status: DISCONTINUED | OUTPATIENT
Start: 2025-07-17 | End: 2025-07-18 | Stop reason: HOSPADM

## 2025-07-17 RX ORDER — SODIUM CHLORIDE 9 MG/ML
INJECTION, SOLUTION INTRAVENOUS
Status: COMPLETED | OUTPATIENT
Start: 2025-07-17 | End: 2025-07-17

## 2025-07-17 RX ORDER — FENTANYL CITRATE 50 UG/ML
25 INJECTION, SOLUTION INTRAMUSCULAR; INTRAVENOUS EVERY 5 MIN PRN
Status: DISCONTINUED | OUTPATIENT
Start: 2025-07-17 | End: 2025-07-17 | Stop reason: HOSPADM

## 2025-07-17 RX ORDER — DEXAMETHASONE SODIUM PHOSPHATE 4 MG/ML
INJECTION, SOLUTION INTRA-ARTICULAR; INTRALESIONAL; INTRAMUSCULAR; INTRAVENOUS; SOFT TISSUE
Status: DISCONTINUED | OUTPATIENT
Start: 2025-07-17 | End: 2025-07-17

## 2025-07-17 RX ORDER — MUPIROCIN 20 MG/G
1 OINTMENT TOPICAL
Status: COMPLETED | OUTPATIENT
Start: 2025-07-17 | End: 2025-07-17

## 2025-07-17 RX ORDER — PROPOFOL 10 MG/ML
VIAL (ML) INTRAVENOUS CONTINUOUS PRN
Status: DISCONTINUED | OUTPATIENT
Start: 2025-07-17 | End: 2025-07-17

## 2025-07-17 RX ORDER — VANCOMYCIN HYDROCHLORIDE 1 G/20ML
INJECTION, POWDER, LYOPHILIZED, FOR SOLUTION INTRAVENOUS
Status: DISCONTINUED | OUTPATIENT
Start: 2025-07-17 | End: 2025-07-17 | Stop reason: HOSPADM

## 2025-07-17 RX ORDER — SODIUM CHLORIDE 9 MG/ML
INJECTION, SOLUTION INTRAVENOUS CONTINUOUS
Status: DISCONTINUED | OUTPATIENT
Start: 2025-07-17 | End: 2025-07-17

## 2025-07-17 RX ORDER — FENTANYL CITRATE 50 UG/ML
100 INJECTION, SOLUTION INTRAMUSCULAR; INTRAVENOUS
Status: DISCONTINUED | OUTPATIENT
Start: 2025-07-17 | End: 2025-07-17 | Stop reason: HOSPADM

## 2025-07-17 RX ORDER — ASPIRIN 81 MG/1
81 TABLET ORAL 2 TIMES DAILY
Status: DISCONTINUED | OUTPATIENT
Start: 2025-07-17 | End: 2025-07-17

## 2025-07-17 RX ORDER — LIDOCAINE HYDROCHLORIDE 10 MG/ML
1 INJECTION, SOLUTION EPIDURAL; INFILTRATION; INTRACAUDAL; PERINEURAL
Status: DISCONTINUED | OUTPATIENT
Start: 2025-07-17 | End: 2025-07-17 | Stop reason: HOSPADM

## 2025-07-17 RX ORDER — ELECTROLYTES/DEXTROSE
400 SOLUTION, ORAL ORAL
Status: DISCONTINUED | OUTPATIENT
Start: 2025-07-17 | End: 2025-07-18 | Stop reason: HOSPADM

## 2025-07-17 RX ORDER — FENTANYL CITRATE 50 UG/ML
25 INJECTION, SOLUTION INTRAMUSCULAR; INTRAVENOUS EVERY 5 MIN PRN
Status: COMPLETED | OUTPATIENT
Start: 2025-07-17 | End: 2025-07-17

## 2025-07-17 RX ORDER — CELECOXIB 200 MG/1
400 CAPSULE ORAL ONCE
Status: COMPLETED | OUTPATIENT
Start: 2025-07-17 | End: 2025-07-17

## 2025-07-17 RX ORDER — OXYCODONE HYDROCHLORIDE 5 MG/1
5 TABLET ORAL
Status: DISCONTINUED | OUTPATIENT
Start: 2025-07-17 | End: 2025-07-17 | Stop reason: HOSPADM

## 2025-07-17 RX ORDER — LIDOCAINE HYDROCHLORIDE 20 MG/ML
INJECTION INTRAVENOUS
Status: DISCONTINUED | OUTPATIENT
Start: 2025-07-17 | End: 2025-07-17

## 2025-07-17 RX ORDER — MIDAZOLAM HYDROCHLORIDE 1 MG/ML
4 INJECTION, SOLUTION INTRAMUSCULAR; INTRAVENOUS
Status: DISCONTINUED | OUTPATIENT
Start: 2025-07-17 | End: 2025-07-17 | Stop reason: HOSPADM

## 2025-07-17 RX ORDER — AMOXICILLIN 250 MG
1 CAPSULE ORAL 2 TIMES DAILY
Status: DISCONTINUED | OUTPATIENT
Start: 2025-07-17 | End: 2025-07-18 | Stop reason: HOSPADM

## 2025-07-17 RX ORDER — EPHEDRINE SULFATE 50 MG/ML
INJECTION, SOLUTION INTRAVENOUS
Status: DISCONTINUED | OUTPATIENT
Start: 2025-07-17 | End: 2025-07-17

## 2025-07-17 RX ADMIN — CELECOXIB 400 MG: 200 CAPSULE ORAL at 08:07

## 2025-07-17 RX ADMIN — PREGABALIN 75 MG: 75 CAPSULE ORAL at 08:07

## 2025-07-17 RX ADMIN — BETHANECHOL CHLORIDE 10 MG: 10 TABLET ORAL at 11:07

## 2025-07-17 RX ADMIN — OXYCODONE HYDROCHLORIDE 5 MG: 5 TABLET ORAL at 05:07

## 2025-07-17 RX ADMIN — TRANEXAMIC ACID 1000 MG: 100 INJECTION, SOLUTION INTRAVENOUS at 10:07

## 2025-07-17 RX ADMIN — CEFAZOLIN 2 G: 2 INJECTION, POWDER, FOR SOLUTION INTRAMUSCULAR; INTRAVENOUS at 05:07

## 2025-07-17 RX ADMIN — VANCOMYCIN HYDROCHLORIDE 1000 MG: 1 INJECTION, POWDER, LYOPHILIZED, FOR SOLUTION INTRAVENOUS at 08:07

## 2025-07-17 RX ADMIN — FAMOTIDINE 20 MG: 10 INJECTION, SOLUTION INTRAVENOUS at 09:07

## 2025-07-17 RX ADMIN — TRANEXAMIC ACID 2000 MG: 100 INJECTION, SOLUTION INTRAVENOUS at 09:07

## 2025-07-17 RX ADMIN — DEXAMETHASONE SODIUM PHOSPHATE 8 MG: 4 INJECTION, SOLUTION INTRAMUSCULAR; INTRAVENOUS at 09:07

## 2025-07-17 RX ADMIN — FENTANYL CITRATE 25 MCG: 50 INJECTION INTRAMUSCULAR; INTRAVENOUS at 01:07

## 2025-07-17 RX ADMIN — Medication 20 MG: at 09:07

## 2025-07-17 RX ADMIN — ASPIRIN 81 MG: 81 TABLET, COATED ORAL at 09:07

## 2025-07-17 RX ADMIN — OXYCODONE HYDROCHLORIDE 5 MG: 5 TABLET ORAL at 08:07

## 2025-07-17 RX ADMIN — ACETAMINOPHEN 1000 MG: 500 TABLET ORAL at 05:07

## 2025-07-17 RX ADMIN — MUPIROCIN 1 G: 20 OINTMENT TOPICAL at 08:07

## 2025-07-17 RX ADMIN — DEXMEDETOMIDINE 8 MCG: 100 INJECTION, SOLUTION, CONCENTRATE INTRAVENOUS at 09:07

## 2025-07-17 RX ADMIN — LIDOCAINE HYDROCHLORIDE 60 MG: 20 INJECTION INTRAVENOUS at 09:07

## 2025-07-17 RX ADMIN — MEPIVACAINE HYDROCHLORIDE 3 ML: 15 INJECTION, SOLUTION EPIDURAL; INFILTRATION at 09:07

## 2025-07-17 RX ADMIN — SODIUM CHLORIDE, SODIUM GLUCONATE, SODIUM ACETATE, POTASSIUM CHLORIDE, MAGNESIUM CHLORIDE, SODIUM PHOSPHATE, DIBASIC, AND POTASSIUM PHOSPHATE: .53; .5; .37; .037; .03; .012; .00082 INJECTION, SOLUTION INTRAVENOUS at 10:07

## 2025-07-17 RX ADMIN — OXYCODONE HYDROCHLORIDE 5 MG: 5 TABLET ORAL at 11:07

## 2025-07-17 RX ADMIN — FENTANYL CITRATE 25 MCG: 50 INJECTION INTRAMUSCULAR; INTRAVENOUS at 12:07

## 2025-07-17 RX ADMIN — PREGABALIN 75 MG: 75 CAPSULE ORAL at 09:07

## 2025-07-17 RX ADMIN — SODIUM CHLORIDE: 9 INJECTION, SOLUTION INTRAVENOUS at 08:07

## 2025-07-17 RX ADMIN — PROPOFOL 20 MG: 10 INJECTION, EMULSION INTRAVENOUS at 09:07

## 2025-07-17 RX ADMIN — MIDAZOLAM HYDROCHLORIDE 2 MG: 2 INJECTION, SOLUTION INTRAMUSCULAR; INTRAVENOUS at 09:07

## 2025-07-17 RX ADMIN — FENTANYL CITRATE 25 MCG: 50 INJECTION, SOLUTION INTRAMUSCULAR; INTRAVENOUS at 09:07

## 2025-07-17 RX ADMIN — CEFAZOLIN 2 G: 2 INJECTION, POWDER, FOR SOLUTION INTRAMUSCULAR; INTRAVENOUS at 09:07

## 2025-07-17 RX ADMIN — BETHANECHOL CHLORIDE 10 MG: 10 TABLET ORAL at 01:07

## 2025-07-17 RX ADMIN — ACETAMINOPHEN 1000 MG: 500 TABLET ORAL at 08:07

## 2025-07-17 RX ADMIN — EPHEDRINE SULFATE 5 MG: 50 INJECTION INTRAVENOUS at 11:07

## 2025-07-17 RX ADMIN — ONDANSETRON 4 MG: 2 INJECTION INTRAMUSCULAR; INTRAVENOUS at 10:07

## 2025-07-17 RX ADMIN — METHOCARBAMOL 1000 MG: 500 TABLET ORAL at 11:07

## 2025-07-17 RX ADMIN — FAMOTIDINE 20 MG: 20 TABLET, FILM COATED ORAL at 09:07

## 2025-07-17 RX ADMIN — EPHEDRINE SULFATE 5 MG: 50 INJECTION INTRAVENOUS at 10:07

## 2025-07-17 RX ADMIN — METHOCARBAMOL 750 MG: 750 TABLET ORAL at 09:07

## 2025-07-17 RX ADMIN — SODIUM CHLORIDE: 0.9 INJECTION, SOLUTION INTRAVENOUS at 08:07

## 2025-07-17 RX ADMIN — MUPIROCIN 1 G: 20 OINTMENT TOPICAL at 09:07

## 2025-07-17 RX ADMIN — SENNOSIDES AND DOCUSATE SODIUM 1 TABLET: 50; 8.6 TABLET ORAL at 09:07

## 2025-07-17 RX ADMIN — PROPOFOL 100 MCG/KG/MIN: 10 INJECTION, EMULSION INTRAVENOUS at 09:07

## 2025-07-17 RX ADMIN — EPHEDRINE SULFATE 10 MG: 50 INJECTION INTRAVENOUS at 10:07

## 2025-07-17 NOTE — DISCHARGE INSTRUCTIONS
Hip Replacement Discharge Instructions   Dr Kar Trevino III      [facility] Post-Op Hotline [phone]:    You will be given a blue wristband with our 24-hour post-op hotline number before you leave the hospital. This phone number is staffed by our [facility] care team members. Please call us with any questions or concerns. Our goal is to try to avoid the emergency room or urgent care as that is were all the sick people are so please call us first. Obviously, if you think you are having an urgent life threatening emergency then please call 911 and go to the ER but the vast majority of issues and concerns can be handled on the phone. While we are always on call, for non-urgent questions and medication refills please call during normal business hours 8:30am-4:30pm Monday-Friday.       Activity + Ambulation:    Unless notified otherwise by Dr Trevino you can weight bear as tolerated with a walker. Your muscles are weak right after surgery and it is important to prevent falls. You can transition off the walker when your therapist says it is safe to stop. With your therapist you can slowly increase your activity over time. Listen to your body.       Physical Therapy:    We typically set up home health therapy prior to discharge.  A therapist will come to your house 2-3 itmes per week for 2 weeks per our protocol. After that continue to do the basic exercises that the therapist shows you on your own 2-3 times per day. At your 6 week post op visit I will give you our phase 2 exercise handout with more stretching and strengthening exercises that you will typically do on your own at home but really time and walking is the best therapy for hips. Unlike knee replacements where we must be aggressive with outpatient therapy, we typically do not use outpatient therapy for hip replacements.      Range of Motion Restrictions (Hip Precautions):    For anterior approach hip replacements you have 2 main rules:    No extremes  of motion: thats really it, just dont force the hip in any one direction   0-90 degrees hip flexion (right angle between torso and thigh): I know you may bend slightly more than this when getting up and down and in and out of a chair but it is really just a reminder to avoid leaning/bending all the way over for the first month after surgery   Posterior approach hip replacement patients will have additional motion recommendations which your therapist will review with you prior to discharge.       Sleeping   You can sleep an any position that is comfortable. Your hip will likely be too sore to sleep on the operative side right away but you can whenever it feels comfortable. If you do sleep on your side I recommend putting a pillow between your knees to take the stress of your back and hips.       Dressing + Incision Care:    Your dressing is the clear tape and white gauze on your hip. This will stay on for 2 weeks and will be removed in clinic at your 2 week post op visit. If there is any liquid/fluid/drainage/bleeding on the dressing, the dressing peels off, or any water leaks into it you must call us or come in for evaluation.       Showering:    The dressing is waterproof so you can shower when you get home. Use running water on the leg and towel dry, you can wash the skin around the dressing with soap and water like normal. Then pat dry with a towel and roll the compression stockings back on. There are 2 main rules about showering in the early post operative period:    No standing in the shower for the first two weeks after surgery to prevent slips and falls. You can use a bench, bedside commode, or shower chair to sit in the shower for the first two weeks.    No soaking/fully submerging your hip in a tub or a pool for a month after surgery.       Swelling:    Your leg will swell and bruise a tremendous amount over the next two weeks, that is normal but will be more than you expect. This will really kick in two  to three days after surgery. Your hip will be more swollen and warm than the other non-operative side for many months after surgery, this is normal.    About 5-7 days after surgery you will notice that your lower leg is more swollen. This is normal and expected.  As you walk more over the week after surgery gravity will pull the swelling from around your hip down your leg. If you notice this happening you should increase the frequency and duration of your elevation (see next).       Elevation:    Elevation is the key to preventing and treating swelling. Gravity pulls the fluid down the leg to the foot so you need to use gravity to pull the fluid back out of the leg to the heart. There is a picture in the surgery book you were given prior to surgery with the leg elevated above the heart. You should elevate you leg above your heart 3-4 times per day for at lease an hour or two if you notice that you are swelling.       Ice + Heat:    You can use ice packs to as much as you want whenever you want but blocking out multiple times a day to ice and elevate is key. Some patients also find that heat (heating pack or pad) helps the sore muscles in the front of the thigh. You can ice over and around the incision/dressing and around the thigh (really wherever you think it helps). You can put heat anywhere but over the incision/dressing. So essentially ice or heat whichever helps as much as you want. Obviously if you feel that it is too hot or cold then remove to prevent skin irritation.       Medications:    Your medications will be delivered to you prior to discharge. The medications instructions will be written on the prescriptions and on your discharge medication reconciliation sheet given to you at discharge. Please call the [facility] post op hotline if you have any questions about your medications.        Blood clot (deep vein thrombosis) Prevention:    Most patients will be given Aspirin 81mg (baby) one tablet twice a  day for 30 days to prevent blood clots in their veins (deep vein thrombosis) and lungs after surgery.    Select patients who are on blood thinners before surgery or have specific risk factors for blood clots with surgery will be placed on alternative medication options, this will be discussed with you before and after surgery.      Multi-modal pain medications:  You will be given multiple different types of pain medications that control different types of pain and inflammation, these include:       -Celebrex (celecoxib) 200mg one tablet daily: Should be taken with some food on your stomach = after breakfast or dinner. This is a once-a-day non-steroidal anti-inflammatory (NSAID) to help with pain and decrease inflammation. This is a high dose daily version of advil/aleve/ibuprofen specifically engineered to minimize stomach upset/irritation and not affect your platelets/minimize bleeding risk. While you are taking the celebrex DO NOT combine with any other over the counter NSAID like  advil/aleve/ibuprofen or prescription NSAID like meloxicam or diclofenac. However, when the celebrex runs out you can then start taking an over-the-counter NSAID like advil/aleve/ibuprofen as needed.   Select patients with risk factors like chronic kidney disease will not be given celebrex and should avoid over the counter NSAIDs like  advil/aleve/ibuprofen.       -Tylenol Arthritis (acetaminophen) 650mg one tablet three times a day=every 8 hours: This is another anti-inflammatory that will help with pain and decrease inflammation. Tylenol and Celebrex (and other NSAIDS) are metabolized differently and dont interact but help each other work. The combination of the two are better than either one alone. Start with three times a day and can as tolerated decrease to twice a day, daily, and then only as needed over the 6-12 weeks after surgery.      -Robaxin (methocarbamol) 750mg 1 tablet up to four times a day as needed for muscle spasm:  This is a muscle relaxer and can be helpful for pain and muscle spasms. It is typically used as needed. It is not a narcotic but some patients can find this medication sedating so dont take right at the same time as your narcotic (oxycodone) try to space out/alternate with the narcotic (oxycodone) = take about an hour or two apart.       -Oxycodone 5mg 1-2 tablets every 4-6 hours: This is the narcotic/opioid medication. Most people get a narcotic called oxycodone. Patients need varying amounts based off their individual metabolism and pain tolerance. The goal is to start spacing the doses out and get off of it as soon as possible. You may still need to take it prior to therapy to be able to work harder with therapy and sometimes patients need a dose at night when it is common to have more pain at the end of the day. Common side effects of narcotics are constipation, nausea, and itching. These are not allergies but instead natural side effects of narcotics. Most patients dont need a refill and we dont send in refills on nights or on weekends so please call Monday to Friday 8:30 am to 4:30pm if you feel that you will require a refill.       -Miralax (polyethylene glycol) 1 capful daily as needed until bowel movement:  This is a stool softener to prevent constipation. Constipation is common after surgery due to the anesthesia medications and the narcotics. I recommend taking this once a day while you are taking the oxycodone/narcotic. If you normally take a different stool softener at home that is fine too. If nothing is working you should go to the drug store and talk to the pharmacist, I recommend trying a suppository as typically if constipation medications by mouth arent working you need something from below to get things going.      -Duricef (cefadroxil) 500mg one tablet twice a day for 7 days: This is an antibiotic pill. There are studies that suggest antibiotics taken twice a day for 7 days after surgery can  decrease the risk of wound healing issues or infection.       -Protonix (pantoprazole) 40mg one tablet daily for 30 days: This is a medication to prevent stomach/acid reflux and protect your stomach lining after surgery while you are on all the other medications. If you are already on a medication to help prevent stomach reflux you can resume that medication instead.      Nutrition support:    You should take the following over the counter medications for the next two weeks for nutrition support to give your body fuel to heal. You can talk to your local pharmacist or find these supplements at places like [employer] and [employer]:    multivitamin one tablet daily x2 weeks   vitamin C 1000mg twice a day x2 weeks   Protein powder to be mixed in a drink: Chavo twice a day or Abintra daily x2 weeks   Meal/protein supplement: boost/ensure one drink daily x2 weeks

## 2025-07-17 NOTE — HPI
Naima Jeffries is a 54 y.o. female with Right hip pain.  Pain is worse with activity and weight bearing.  Patient has experienced interference of activities of daily living due to increased pain and decreased range of motion. Patient has failed non-operative treatment including NSAIDs, as well as greater than 3 months of activity modification. Naima Jeffries ambulates independently

## 2025-07-17 NOTE — ANESTHESIA PREPROCEDURE EVALUATION
07/16/2025    Naima Jeffries is a 54 y.o. female  who presents  for Procedure(s):  ARTHROPLASTY, HIP, TOTAL, ANTERIOR APPROACH: RIGHT: DEPUY - ACTIS + EMPHASYS        Review of patient's allergies indicates:  No Known Allergies    Wt Readings from Last 1 Encounters:   06/30/25 1429 75.5 kg (166 lb 7.2 oz)       BP Readings from Last 3 Encounters:   06/30/25 120/84   06/24/25 137/81   05/15/25 (!) 155/78       Patient Active Problem List    Diagnosis Date Noted    Osteoarthritis of right hip 06/24/2025    Vaginal bleeding 06/24/2025    Weakness of right hip 05/13/2025    Decreased range of motion of trunk and back 05/13/2025    Weakness of right shoulder 05/13/2025    Vaginal atrophy 06/29/2022    Situational depression 07/24/2020        Past Surgical History:   Procedure Laterality Date    BREAST BIOPSY Right 2018    benign    COLONOSCOPY N/A 07/28/2022    Procedure: COLONOSCOPY;  Surgeon: Akua Haider MD;  [facility] [facility]  Service: Endoscopy;  Laterality: N/A;  original order from Dr Florian on 11/5/21-unusable. new order placed        vaccinated-GT    COLONOSCOPY N/A 11/09/2022    Procedure: COLONOSCOPY;  Surgeon: Gael Rico MD;  [facility] [employer] [facility]  Service: Endoscopy;  Laterality: N/A;  old case request unusable, new case request placed  fully vaccinated, instructions emailed to [email or web address]  golytely. pre call complete A.s    EXCISION OF PTERYGIUM Right     EYE SURGERY  Not sure    Pterigium removed    LASER ABLATION OF THE CERVIX  1989       Social History[1]      Chemistry        Component Value Date/Time     06/11/2025 1235     03/14/2025 0924    K 4.9 06/11/2025 1235    K 4.2 03/14/2025 0924     06/11/2025 1235     03/14/2025 0924    CO2 27 06/11/2025 1235    CO2 25 03/14/2025 0924    BUN 13 06/11/2025 1235    CREATININE 0.8 06/11/2025 1235     (H) 06/11/2025 1235    GLU 81 03/14/2025 0924        Component Value Date/Time     "CALCIUM 9.3 06/11/2025 1235    CALCIUM 9.5 03/14/2025 0924    ALKPHOS 45 06/11/2025 1235    ALKPHOS 42 03/14/2025 0924    AST 21 06/11/2025 1235    AST 20 03/14/2025 0924    ALT 15 06/11/2025 1235    ALT 16 03/14/2025 0924    BILITOT 0.5 06/11/2025 1235    BILITOT 0.5 03/14/2025 0924    ESTGFRAFRICA >60.0 11/05/2021 1451    EGFRNONAA >60.0 11/05/2021 1451            Lab Results   Component Value Date    WBC 5.59 06/11/2025    HGB 13.0 06/11/2025    HCT 41.7 06/11/2025     06/11/2025    CHOL 227 (H) 03/14/2025    TRIG 40 03/14/2025     (H) 03/14/2025    ALT 15 06/11/2025    AST 21 06/11/2025     06/11/2025    K 4.9 06/11/2025     06/11/2025    CREATININE 0.8 06/11/2025    BUN 13 06/11/2025    CO2 27 06/11/2025    TSH 2.002 03/14/2025    INR 0.9 06/11/2025    HGBA1C 5.1 03/14/2025       No results for input(s): "PT", "INR", "PROTIME", "APTT" in the last 72 hours.    No results found for this or any previous visit.         Pre-op Assessment    I have reviewed the Patient Summary Reports.     I have reviewed the Nursing Notes. I have reviewed the NPO Status.   I have reviewed the Medications.     Review of Systems  Anesthesia Hx:              Personal Hx of Anesthesia complications                  Difficult or Failed Neuraxial Anesthesia (For 1st labor, epidural ineffective.)  Musculoskeletal:  Arthritis        Arthritis          Neurological:      Arthritis                           Psych:  Psychiatric History                  Physical Exam  General: Well nourished, Cooperative, Oriented and Alert    Airway:  Mallampati: II / I  Mouth Opening: Normal  TM Distance: Normal  Tongue: Normal  Neck ROM: Normal ROM    Dental:  Intact          Anesthesia Assessment: Preoperative EQUATION    Planned Procedure: Procedure(s) (LRB):  ARTHROPLASTY, HIP, TOTAL, ANTERIOR APPROACH: RIGHT: DEPUY - ACTIS + EMPHASYS (Right)  Requested Anesthesia Type:Spinal/Epidural  Surgeon: Kar Trevino III, MD  Service: " Orthopedics  Known or anticipated Date of Surgery:7/17/2025    Surgeon notes: reviewed    Electronic QUestionnaire Assessment completed via nurse interview with patient.        Triage considerations:     The patient has no apparent active cardiac condition (No unstable coronary Syndrome such as severe unstable angina or recent [<1 month] myocardial infarction, decompensated CHF, severe valvular   disease or significant arrhythmia)    Previous anesthesia records:GETA and No problems  11/9/22   COLONOSCOPY   Airway:  Mallampati: III / II  Mouth Opening: Normal  TM Distance: Normal  Tongue: Normal  Neck ROM: Normal ROM    Last PCP note: within 3 months , within [facility]   Subspecialty notes: Ortho    Other important co-morbidities: No signficiant history      Tests already available:  Available tests,  within [facility] .   3/14/25 A1C, CMP, CBC  12/3/21 LUMBAR MRI   3/16/23 EKG             Instructions given. (See in Nurse's note)    Optimization:  Anesthesia Preop Clinic Assessment  Indicated POC    Medical Opinion Indicated       Sub-specialist consult indicated:   [facility] NP       Plan:    Testing:  CMP, Hematology Profile, and PT/INR   Pre-anesthesia  visit       Visit focus: possible regional anesthesia and/or nerve block      Consultation:[facility] NP for medical and anesthesia optimization       Patient  has previously scheduled Medical Appointment: 6/24     Navigation: Tests Scheduled.              Consults scheduled.             Results will be tracked by Preop Clinic.    6/24/25 Patient is optimized     Recent vaginal bleeding BRB for the past 3 days  She reports being perimenopausal  Referral placed to GYN for evalaution   Endometrial biopsy negative- OK to proceed with surgery     I spent a total of 40 minutes on the day of the visit.This includes face to face time and non-face to face time preparing to see the patient (eg, review of tests), obtaining and/or reviewing separately obtained  history, documenting clinical information in the electronic or other health record, independently interpreting results and communicating results to the patient/family/caregiver, or care coordinator.        Bhavin Amaya NP  Perioperative Medicine  [facility]   Pager [phone]          Anesthesia Plan  Type of Anesthesia, risks & benefits discussed:    Anesthesia Type: Gen ETT, Gen Supraglottic Airway, Gen Natural Airway, Epidural, Spinal, CSE  Intra-op Monitoring Plan: Standard ASA Monitors  Post Op Pain Control Plan: multimodal analgesia and IV/PO Opioids PRN  Induction:  IV  Informed Consent: Informed consent signed with the Patient and all parties understand the risks and agree with anesthesia plan.  All questions answered.   ASA Score: 2  Day of Surgery Review of History & Physical: H&P Update referred to the surgeon/provider.    Ready For Surgery From Anesthesia Perspective.     .         [1]   Social History  Socioeconomic History    Marital status: Single    Number of children: 2   Occupational History    Occupation: unemployed   Tobacco Use    Smoking status: Never     Passive exposure: Never    Smokeless tobacco: Never   Substance and Sexual Activity    Alcohol use: Yes     Comment: 3-4 times per week mixed drinks on we 2-3 wine with dinner 1-2    Drug use: Never    Sexual activity: Yes     Partners: Male     Birth control/protection: None     Social Drivers of Health     Financial Resource Strain: Low Risk  (6/5/2025)    Overall Financial Resource Strain (CARDIA)     Difficulty of Paying Living Expenses: Not very hard   Food Insecurity: No Food Insecurity (6/5/2025)    Hunger Vital Sign     Worried About Running Out of Food in the Last Year: Never true     Ran Out of Food in the Last Year: Never true   Transportation Needs: No Transportation Needs (6/5/2025)    PRAPARE - Transportation     Lack of Transportation (Medical): No     Lack of Transportation (Non-Medical): No   Physical Activity:  Insufficiently Active (6/5/2025)    Exercise Vital Sign     Days of Exercise per Week: 1 day     Minutes of Exercise per Session: 20 min   Stress: Stress Concern Present (6/5/2025)    [place] [employer] - Occupational Stress Questionnaire     Feeling of Stress : To some extent   Housing Stability: Low Risk  (6/5/2025)    Housing Stability Vital Sign     Unable to Pay for Housing in the Last Year: No     Number of Times Moved in the Last Year: 0     Homeless in the Last Year: No

## 2025-07-17 NOTE — ANESTHESIA PROCEDURE NOTES
CSE    Patient location during procedure: OR  Start time: 7/17/2025 9:40 AM  Timeout: 7/17/2025 9:40 AM  End time: 7/17/2025 9:49 AM      Staffing  Authorizing Provider: Ericka Weiss MD  Performing Provider: Ericka Weiss MD    Staffing  Performed by: Ericka Weiss MD  Authorized by: Ericka Weiss MD    Preanesthetic Checklist  Completed: patient identified, IV checked, site marked, risks and benefits discussed, surgical consent, monitors and equipment checked, pre-op evaluation and timeout performed  CSE  Patient position: sitting  Prep: ChloraPrep  Patient monitoring: heart rate, continuous pulse ox and frequent blood pressure checks  Approach: midline  Spinal Needle  Needle type: Flaquito   Needle gauge: 25 G  Needle length: 5 in  Epidural Needle  Injection technique: REBA air  Needle type: Tuohy   Needle gauge: 17 G  Needle length: 3.5 in  Needle insertion depth: 8 cm  Location: L3-4  Needle localization: anatomical landmarks   Catheter  Catheter type: springwound  Catheter size: 19 G  Catheter at skin depth: 12 cm  Additional Documentation: incremental injection, negative aspiration for CSF, negative aspiration for heme and negative test dose  Assessment  Intrathecal Medications:   administered: primary anesthetic mcg of    Additional Notes  Multiple levels attempted, loss achieved twice but no CSF through spinal needle. Reapproached third time with good loss and clear csf appreciated.   Medications:    Medications: Intrathecal - mepivacaine (CARBOCAINE) injection 15 mg/mL (1.5%) - Intrathecal   3 mL - 7/17/2025 9:49:00 AM

## 2025-07-17 NOTE — TRANSFER OF CARE
"Anesthesia Transfer of Care Note    Patient: Naima Jeffries    Procedure(s) Performed: Procedure(s) (LRB):  ARTHROPLASTY, HIP, TOTAL, ANTERIOR APPROACH: RIGHT: DEPUY - ACTIS + EMPHASYS (Right)    Patient location: PACU    Anesthesia Type: CSE    Transport from OR: Transported from OR on room air with adequate spontaneous ventilation    Post pain: adequate analgesia    Post assessment: no apparent anesthetic complications and tolerated procedure well    Post vital signs: stable    Level of consciousness: awake    Nausea/Vomiting: no nausea/vomiting    Complications: none    Transfer of care protocol was followed      Last vitals: Visit Vitals  /75 (BP Location: Left arm, Patient Position: Lying)   Pulse 62   Temp 36.7 °C (98 °F) (Temporal)   Resp 16   Ht 5' 7" (1.702 m)   Wt 75.3 kg (166 lb)   SpO2 99%   Breastfeeding No   BMI 26.00 kg/m²     "

## 2025-07-17 NOTE — PT/OT/SLP EVAL
"Occupational Therapy Evaluation and Treatment    Name: Naima Jeffries  MRN: [id number]  Admitting Diagnosis: Primary osteoarthritis of right hip * Day of Surgery *  Recent Surgery: Procedure(s) (LRB):  ARTHROPLASTY, HIP, TOTAL, ANTERIOR APPROACH: RIGHT: DEPUY - ACTIS + EMPHASYS (Right) * Day of Surgery *    Recommendations:     Discharge Recommendations: Low Intensity Therapy  Level of Assistance Recommended: 24 hours supervision  Discharge Equipment Recommendations: shower chair, hip kit  Barriers to discharge: None    Assessment:     Naima Jeffries is a 54 y.o. female with a medical diagnosis of Primary osteoarthritis of right hip. She presents with performance deficits affecting function including impaired self care skills, impaired functional mobility, orthopedic precautions. Pt was able to perform supine/sit T/F c CGA and was able to perform sit/stand, stand pivot to BSC over toilet, and stand pivot to chair c CGA and RW.  Able to walk from bed to bathroom and then to chair c CGA and RW.  Able to perform UB dressing c min A.  Educated pt on hip precautions and adaptive equipment for shower.    Rehab Prognosis: Good; patient would benefit from acute OT services to address these deficits and reach maximum level of function.    Plan:     Patient to be seen daily to address the above listed problems via self-care/home management, therapeutic activities, therapeutic exercises  Plan of Care Expires: 07/18/25  Plan of Care Reviewed with: patient, significant other    Subjective     Chief Complaint: R DEANDRE  Patient Comments/Goals: "I couldn't walk before this."  Pain/Comfort:  Pain Rating 1: 4/10    Patients cultural, spiritual, Taoism conflicts given the current situation: no    Social History:  Living Environment: Patient lives with their significant other in a 2 story home with number of outside stair(s): 1, number of inside stair(s): full flight c a rail on the L side, bed/bath on 1st and 2nd floor, can live on 1st " floor, and tub-shower combo  Prior Level of Function: Prior to admission, patient was independent.  Roles and Routines: Patient was driving and working as  prior to admission.  Equipment Used at Home: walker, rolling, raised toilet  DME owned (not currently used): rolling walker  Assistance Upon Discharge: significant other    Objective:     Communicated with RN prior to session. Patient found supine with cryotherapy, FCD, peripheral IV upon OT entry to room.    General Precautions: Standard, fall   Orthopedic Precautions: RLE weight bearing as tolerated, RLE anterior precautions (0-90* of hip flexion and no extremes of motion)   Braces: N/A    Respiratory Status: Room air    Occupational Performance    Gait belt applied - Yes    Bed Mobility:   Supine to sit from left side of bed with supervision    Functional Mobility/Transfers:  Sit <> Stand Transfer with contact guard assistance with rolling walker  Bed <> Chair Transfer using Stand Pivot technique with contact guard assistance with rolling walker  Toilet Transfer Stand Pivot technique with contact guard assistance with rolling walker and bedside commode  Functional Mobility: Pt was able to walk from bed to bathroom and then to chair c CGA and RW.    Activities of Daily Living:  Upper Body Dressing: minimum assistance to don hospital gown.    Physical Exam:  Upper Extremity Range of Motion:     -       Right Upper Extremity: WFL  -       Left Upper Extremity: WFL  Upper Extremity Strength:    -       Right Upper Extremity: WFL  -       Left Upper Extremity: WFL    AMPAC 6 Click ADL:  AMPAC Total Score: 16    Treatment & Education:  Educated on the importance of mobility to maximize recovery  Educated on the importance of OOB mobility within safe range in order to decrease adverse effects of prolonged bedrest  Educated on Anterior hip precautions - patient recalled 3 hip precautions  Educated on use of hip kit during LB dressing  Educated on safety with  functional mobility; hand placement to ensure safe transfers to various surfaces in prep for ADLs  Educated on performing functional mobility and ADLs in adherence to orthopedic precautions  Educated on weight bearing status  Will continue to educate as needed      Patient clear to ambulate to/from bathroom with RN/PCT, assist x1.    Patient left up in chair with all lines intact, call button in reach, and RN notified.    GOALS:   Multidisciplinary Problems       Occupational Therapy Goals          Problem: Occupational Therapy    Goal Priority Disciplines Outcome Interventions   Occupational Therapy Goal     OT, PT/OT Progressing    Description: Goals to be met by: 7/18/25     Patient will increase functional independence with ADLs by performing:    UE Dressing with Modified [place].  LE Dressing with Modified [place] and Assistive Devices as needed.  Grooming while standing at sink with Modified [place].  Toileting from bedside commode with Modified [place] for hygiene and clothing management.   Bathing from  shower chair/bench with Modified [place].  Toilet transfer to bedside commode with Modified [place].  Pt will state all hip precautions correctly.                         DME Justifications:  No DME recommended requiring DME justifications    History:     Past Medical History:   Diagnosis Date    Abnormal Pap smear of cervix 1989    Anxiety     Arthritis     Candida glabrata infection 06/29/2022    Counseling for hormone replacement therapy 12/07/2021    Depression     Fever blister     IUD (intrauterine device) in place 07/24/2020    Mirena 9/2015      S/P D&C (status post dilation and curettage)     Vaginal discharge 06/07/2022         Past Surgical History:   Procedure Laterality Date    BREAST BIOPSY Right 2018    benign    COLONOSCOPY N/A 07/28/2022    Procedure: COLONOSCOPY;  Surgeon: Akua Haider MD;  Location: [address]);  Service: Endoscopy;  Laterality:  N/A;  original order from Dr Florian on 11/5/21-unusable. new order placed        vaccinated-GT    COLONOSCOPY N/A 11/09/2022    Procedure: COLONOSCOPY;  Surgeon: Gael Rico MD;  Location: [facility] ([address] [place] [address]);  Service: Endoscopy;  Laterality: N/A;  old case request unusable, new case request placed  fully vaccinated, instructions emailed to [email or web address]  golytely. pre call complete A.s    EXCISION OF PTERYGIUM Right     EYE SURGERY  Not sure    Pterigium removed    LASER ABLATION OF THE CERVIX  1989       Time Tracking:     OT Date of Treatment: 07/17/25  OT Start Time: 1449  OT Stop Time: 1522  OT Total Time (min): 33 min    Billable Minutes: Evaluation 10, Self Care/Home Management 12, and Therapeutic Activity 11    7/17/2025

## 2025-07-17 NOTE — PT/OT/SLP EVAL
"Physical Therapy Evaluation and Treatment    Patient Name: Naima Jeffries   MRN: [id number]  Recent Surgery: Procedure(s) (LRB):  ARTHROPLASTY, HIP, TOTAL, ANTERIOR APPROACH: RIGHT: DEPUY - ACTIS + EMPHASYS (Right) * Day of Surgery *    Recommendations:     Discharge Recommendations: Low Intensity Therapy   Discharge Equipment Recommendations: walker, rolling, bedside commode, shower chair   Barriers to discharge: None    Assessment:     Naima Jeffries is a 54 y.o. female admitted with a medical diagnosis of Primary osteoarthritis of right hip. She presents with the following impairments/functional limitations: weakness, impaired endurance, impaired self care skills, impaired functional mobility, gait instability, pain, decreased ROM, edema, orthopedic precautions. Pt presents s/p R DEANDRE with expected post-op deficits.  Pt did really well with PT today and was able to amb 280' with RW and SBA .  She amb with decrease sukhdeep and step length and noted antalgic gait pattern but had no LOB and no dizziness. She appeared to have good understanding of all ed provided during session including ant hip precautions. She is highly motivated to progress with PT and had several questions regarding progress with mobility and indep at home.  She will benefit from cont PT to maximize  functional recovery, strength and ROM.      Rehab Prognosis: Good; patient would benefit from acute PT services to address these deficits and reach maximum level of function.    Plan:     During this hospitalization, patient to be seen daily to address the above listed problems via gait training, therapeutic activities, therapeutic exercises    Plan of Care Expires: 08/16/25    Subjective     Chief Complaint: " I am pretty good"  Patient Comments/Goals: to walk without pain  Pain/Comfort:  Pain Rating 1: 6/10 (with mobility)  Location - Side 1: Right  Location 1: hip  Pain Addressed 1: Pre-medicate for activity, Reposition, Distraction, Cessation of " Activity  Pain Rating Post-Intervention 1: 3/10 (at rest)    Social History:  Living Environment: Patient lives with their 2 sons, ages 10 and 13,  in a 2 story home with number of outside stair(s): entry only, number of inside stair(s): 18-20 steps with no landings and L railing, bed/bath on 2nd floor, can live on 1st floor, and tub-shower combo Pt's sons are currently in [place] with their father and pt's boyfriend will be staying with her until Aug 30th to assist her.  Prior Level of Function: Prior to admission, patient was independent  Equipment Used at Home: none  DME owned (not currently used): rolling walker  Assistance Upon Discharge: her boyfriend    Objective:     Communicated with RN and OT prior to session. Patient found up in chair with cryotherapy, FCD, peripheral IV upon PT entry to room.    General Precautions: Standard, fall   Orthopedic Precautions: RLE weight bearing as tolerated, RLE anterior precautions (WBAT with walker, no extremes of motion, hip flexion 0-90 degrees)  Braces: N/A    Respiratory Status: Room air    Exams:  RLE ROM: WFL except ant hip precautions  RLE Strength: at least 3/5  LLE ROM: WNL  LLE Strength: WNL  Cognitive: Patient is oriented to Person, Place, Time, Situation  Gross Motor Coordination: WFL  Postural Exam: Patient presented with the following abnormalities:    -       No postural abnormalities identified  Sensation:    -       Intact    Functional Mobility:  Gait belt applied - Yes  Bed Mobility  Seated in chair at start of session and returned to chair  Transfers  Sit to Stand: contact guard assistance with rolling walker and with cues for hand placement and foot placement  Gait  Patient ambulated 280' with rolling walker and stand by assistance. Patient required cues for heel strike, position in walker, upright posture, and weight bearing status to increase independence and safety. Patient required cues ~ 25% of the time.  Pt amb with step-to pattern initially but able  to progress to step-through gait pattern  Gait Deviations:  steady gait, decreased step length, decreased sukhdeep, antalgic gait, and increased time in stance phase on unaffected leg      Balance  Sitting: GOOD: Maintains balance through MODERATE excursions of active trunk movement  Standing: FAIR+: Needs CLOSE SUPERVISION during gait and is able to right self with minor LOB    Therapeutic Activities and Exercises:  Patient educated on role of acute care PT and PT POC, safety while in hospital including calling nurse for mobility, and call light usage.  Educated about weightbearing as thomas R LE and provided cuing for adherence as appropriate during session.  Educated about importance of OOB mobility and remaining up in chair most of the day.  PT reviewed ant hip precautions with pt and applications to mobility and she demonstrated good understanding back to PT.    PT ed pt on use of cryotherapy for edema and pain control, and on safety awareness with use of RW in the home and pt verbalized good understanding back to PT.   PT answered all questions for pt within PT scope of practice. Pt had several questions regarding home mobility progress and home DME needs.     AM-PAC 6 CLICK MOBILITY  Total Score:18    Patient left up in chair with all lines intact, call button in reach, and RN notified.    GOALS:   Multidisciplinary Problems       Physical Therapy Goals          Problem: Physical Therapy    Goal Priority Disciplines Outcome Interventions   Physical Therapy Goal     PT, PT/OT Progressing    Description: Goals to be met by: 25     Patient will increase functional independence with mobility by performin. Supine to sit with Supervision  2. Sit to stand transfer with Supervision  3. Gait  x 150 feet with Supervision using Rolling Walker.   4. Ascend/Descend 6 inch curb step with Stand-by Assistance using Rolling Walker.  5. Lower extremity ant DEANDRE home exercise program x 30 reps per handout, with  supervision                         DME Justifications:   Naima's mobility limitation cannot be sufficiently resolved by the use of a cane. Her functional mobility deficit can be sufficiently resolved with the use of a Rolling Walker. Patient's mobility limitation significantly impairs their ability to participate in one of more activities of daily living.  The use of a RW will significantly improve the patient's ability to participate in MRADLS and the patient will use it on regular basis in the home.    History:     Past Medical History:   Diagnosis Date    Abnormal Pap smear of cervix 1989    Anxiety     Arthritis     Candida glabrata infection 06/29/2022    Counseling for hormone replacement therapy 12/07/2021    Depression     Fever blister     IUD (intrauterine device) in place 07/24/2020    Mirena 9/2015      S/P D&C (status post dilation and curettage)     Vaginal discharge 06/07/2022       Past Surgical History:   Procedure Laterality Date    BREAST BIOPSY Right 2018    benign    COLONOSCOPY N/A 07/28/2022    Procedure: COLONOSCOPY;  Surgeon: Akua Haider MD;  Location: [facility] ([address] [place] [address]);  Service: Endoscopy;  Laterality: N/A;  original order from Dr Florian on 11/5/21-unusable. new order placed        vaccinated-GT    COLONOSCOPY N/A 11/09/2022    Procedure: COLONOSCOPY;  Surgeon: Gael Rico MD;  Location: [facility] ([address] [place] [address]);  Service: Endoscopy;  Laterality: N/A;  old case request unusable, new case request placed  fully vaccinated, instructions emailed to [email or web address]  golytely. pre call complete A.s    EXCISION OF PTERYGIUM Right     EYE SURGERY  Not sure    Pterigium removed    LASER ABLATION OF THE CERVIX  1989       Time Tracking:     PT Received On: 07/17/25  PT Start Time: 1528  PT Stop Time: 1610  PT Total Time (min): 42 min     Billable Minutes: Evaluation 12, Gait Training 14, and Therapeutic Activity 16    7/17/2025

## 2025-07-17 NOTE — SUBJECTIVE & OBJECTIVE
"Principal Problem:Primary osteoarthritis of right hip    Principal Orthopedic Problem: same    Interval History: Patient seen and examined at bedside. No acute events overnight. Patient tolerated surgery well yesterday. Pain is controlled. They have been voiding spontaneously overnight. Plan to work with PT/OT this morning. Anticipate discharge to home today.       Review of patient's allergies indicates:  No Known Allergies    Current Facility-Administered Medications   Medication    acetaminophen tablet 1,000 mg    aspirin EC tablet 81 mg    ceFAZolin 2 g    electrolytes-dextrose (Pedialyte) oral solution 400 mL    [START ON 7/18/2025] EScitalopram oxalate tablet 10 mg    famotidine tablet 20 mg    methocarbamoL tablet 750 mg    morphine injection 2 mg    mupirocin 2 % ointment 1 g    naloxone 0.4 mg/mL injection 0.02 mg    ondansetron injection 4 mg    oxyCODONE immediate release tablet 5 mg    oxyCODONE immediate release tablet Tab 10 mg    polyethylene glycol packet 17 g    polyethylene glycol packet 17 g    pregabalin capsule 75 mg    prochlorperazine injection Soln 5 mg    senna-docusate 8.6-50 mg per tablet 1 tablet     Objective:     Vital Signs (Most Recent):  Temp: 97.8 °F (36.6 °C) (07/17/25 1344)  Pulse: (!) 57 (07/17/25 1344)  Resp: 14 (07/17/25 1344)  BP: 133/78 (07/17/25 1344)  SpO2: 98 % (07/17/25 1344) Vital Signs (24h Range):  Temp:  [97.6 °F (36.4 °C)-98 °F (36.7 °C)] 97.8 °F (36.6 °C)  Pulse:  [56-73] 57  Resp:  [14-18] 14  SpO2:  [98 %-100 %] 98 %  BP: ()/(59-81) 133/78     Weight: 75.3 kg (166 lb)  Height: 5' 7" (170.2 cm)  Body mass index is 26 kg/m².      Intake/Output Summary (Last 24 hours) at 7/17/2025 1546  Last data filed at 7/17/2025 1359  Gross per 24 hour   Intake 3120 ml   Output 900 ml   Net 2220 ml        Ortho/SPM Exam  AAOx4  NAD  Reg rate  No increased WOB    RLE:  Dressing c/d/i  SILT T/SP/DP/Quick/Sa  Motor intact T/SP/DP  WWP extremities  FCDs in place and functioning     "   Significant Labs: All pertinent labs within the past 24 hours have been reviewed.    Significant Imaging: I have reviewed all pertinent imaging results/findings.

## 2025-07-17 NOTE — ASSESSMENT & PLAN NOTE
Naima Jeffries is a 54 y.o. female is s/p R DEANDRE on 7/17/25    Surgical dressing C/D/I  Pain control: multimodal, Anesthesia Surgical Home following  PT/OT: WBAT RLE, anterior hip precautions  DVT PPx: ASA 81 BID, FCDs at all times when not ambulating  Abx: postop Ancef  [place]: none  [place]: none    Dispo: plan to dc to home today once cleared by PT/OT

## 2025-07-17 NOTE — PLAN OF CARE
Problem: Occupational Therapy  Goal: Occupational Therapy Goal  Description: Goals to be met by: 7/18/25     Patient will increase functional independence with ADLs by performing:    UE Dressing with Modified [place].  LE Dressing with Modified [place] and Assistive Devices as needed.  Grooming while standing at sink with Modified [place].  Toileting from bedside commode with Modified [place] for hygiene and clothing management.   Bathing from  shower chair/bench with Modified [place].  Toilet transfer to bedside commode with Modified [place].  Pt will state all hip precautions correctly.    Outcome: Progressing

## 2025-07-17 NOTE — PLAN OF CARE
Problem: Adult Inpatient Plan of Care  Goal: Plan of Care Review  7/17/2025 1503 by Chichi Yousif, RN  Outcome: Progressing  Flowsheets (Taken 7/17/2025 1503)  Plan of Care Reviewed With:   patient   caregiver     Problem: Adult Inpatient Plan of Care  Goal: Patient-Specific Goal (Individualized)  7/17/2025 1503 by Chichi Yousif, RN  Outcome: Progressing  Flowsheets (Taken 7/17/2025 1503)  Individualized Care Needs: pain management     Problem: Adult Inpatient Plan of Care  Goal: Absence of Hospital-Acquired Illness or Injury  Intervention: Identify and Manage Fall Risk  Flowsheets (Taken 7/17/2025 1431)  Safety Promotion/Fall Prevention:   Fall Risk reviewed with patient/family   family expresses understanding of fall risk and prevention   gait belt with ambulation   high risk medications identified   in recliner, wheels locked   instructed to call staff for mobility   lighting adjusted   medications reviewed   muscle strengthening facilitated   nonskid shoes/socks when out of bed   side rails raised x 2   Supervised toileting - stay within arms reach     Problem: Adult Inpatient Plan of Care  Goal: Optimal Comfort and Wellbeing  Intervention: Monitor Pain and Promote Comfort  Flowsheets (Taken 7/17/2025 1503)  Pain Management Interventions:   breathing exercises utilized   care clustered   medication offered   pillow support provided   relaxation techniques promoted     Problem: Pain Acute  Goal: Optimal Pain Control and Function  Intervention: Develop Pain Management Plan  Flowsheets (Taken 7/17/2025 1503)  Pain Management Interventions:   breathing exercises utilized   care clustered   medication offered   pillow support provided   relaxation techniques promoted     Problem: Pain Acute  Goal: Optimal Pain Control and Function  Intervention: Prevent or Manage Pain  Flowsheets (Taken 7/17/2025 1503)  Sleep/Rest Enhancement: awakenings minimized  Sensory Stimulation Regulation: auditory stimulation  minimized  Medication Review/Management: medications reviewed     Problem: Pain Acute  Goal: Optimal Pain Control and Function  Intervention: Optimize Psychosocial Wellbeing  Flowsheets (Taken 7/17/2025 1503)  Supportive Measures:   active listening utilized   positive reinforcement provided   verbalization of feelings encouraged     Problem: Hip Arthroplasty  Goal: Absence of Bleeding  Intervention: Monitor and Manage Bleeding  Flowsheets (Taken 7/17/2025 1503)  Bleeding Management: dressing monitored     Problem: Hip Arthroplasty  Goal: Nausea and Vomiting Relief  Intervention: Prevent or Manage Nausea and Vomiting  Flowsheets (Taken 7/17/2025 1503)  Nausea/Vomiting Interventions: nausea triggers minimized     Problem: Comorbidity Management  Goal: Blood Pressure in Desired Range  Intervention: Maintain Blood Pressure Management  Flowsheets (Taken 7/17/2025 1503)  Medication Review/Management: medications reviewed     Problem: Fall Injury Risk  Goal: Absence of Fall and Fall-Related Injury  Intervention: Promote Injury-Free Environment  Flowsheets (Taken 7/17/2025 1503)  Safety Promotion/Fall Prevention:   Fall Risk reviewed with patient/family   family expresses understanding of fall risk and prevention   medications reviewed   lighting adjusted   instructed to call staff for mobility   gait belt with ambulation   in recliner, wheels locked   high risk medications identified   muscle strengthening facilitated   nonskid shoes/socks when out of bed   Supervised toileting - stay within arms reach   Plan of care reviewed with patient; verbalized understanding. No further concerns as of present.  Medications reviewed and administered as ordered.Throughout shift, pt remained oriented x 4, calm, respirations even and unlabored w/ no SOB.  Safety precautions maintained throughout shift, rounding for safety and patient care per policy, remained free of falls/injury. Pt voiding w/o difficulty. Pain assessment completed  Q2H, pain managed w/meds as ordered. Patient working appropriately with PT/OT.  DME at bedside.  Call light within reach, bed wheels locked, bed in lowest position, side rails up x2, safety maintained. NADN, Will continue provide education and support.

## 2025-07-17 NOTE — HOSPITAL COURSE
On 7/17/25, the patient arrived to the [facility] pre-operative holding area for proper pre-operative management.  Upon completion of pre-operative preparation, the patient was taken back to the operative theatre. R DEANDRE was performed without complication and the patient was transported to the post anesthesia care unit in stable condition.  After appropriate recovery from the anaesthetic agents used during the surgery, the patient was then transported to the hospital inpatient floor.  The interim of the hospital stay from arrival on the floor up to discharge has been uncomplicated. The patient has tolerated regular diet.  The patient's pain has been controlled using a multimodal approach. Currently, the patient's pain is well controlled on an oral regimen.  The patient has been voiding without difficulty.  The patient began participation in physical therapy after surgery and has progressed throughout the entire hospital stay.  Currently, the patient's progress is sufficient to allow the them to be discharged to home safely.  The patient agrees with this assessment and desires a discharge today.

## 2025-07-17 NOTE — ANESTHESIA POSTPROCEDURE EVALUATION
Anesthesia Post Evaluation    Patient: Naima Jeffries    Procedure(s) Performed: Procedure(s) (LRB):  ARTHROPLASTY, HIP, TOTAL, ANTERIOR APPROACH: RIGHT: DEPUY - ACTIS + EMPHASYS (Right)    Final Anesthesia Type: CSE      Patient location during evaluation: PACU  Patient participation: Yes- Able to Participate  Level of consciousness: awake and alert and oriented  Post-procedure vital signs: reviewed and stable  Pain management: adequate  Airway patency: patent    PONV status at discharge: No PONV  Anesthetic complications: no      Cardiovascular status: blood pressure returned to baseline  Respiratory status: unassisted, spontaneous ventilation and room air  Hydration status: euvolemic  Follow-up not needed.              Vitals Value Taken Time   /76 07/17/25 16:04   Temp 36.8 °C (98.3 °F) 07/17/25 16:04   Pulse 67 07/17/25 16:04   Resp 16 07/17/25 17:21   SpO2 100 % 07/17/25 16:04         Event Time   Out of Recovery 13:36:00         Pain/Coni Score: Pain Rating Prior to Med Admin: 5 (7/17/2025  5:21 PM)  Pain Rating Post Med Admin: 7 (7/17/2025  1:15 PM)  Coni Score: 10 (7/17/2025  1:25 PM)

## 2025-07-17 NOTE — PLAN OF CARE
Problem: Physical Therapy  Goal: Physical Therapy Goal  Description: Goals to be met by: 25     Patient will increase functional independence with mobility by performin. Supine to sit with Supervision  2. Sit to stand transfer with Supervision  3. Gait  x 150 feet with Supervision using Rolling Walker.   4. Ascend/Descend 6 inch curb step with Stand-by Assistance using Rolling Walker.  5. Lower extremity ant DEANDRE home exercise program x 30 reps per handout, with supervision    Outcome: Progressing

## 2025-07-17 NOTE — OP NOTE
Uriel DAVE right hip  OPERATIVE NOTE      Date of Operation:   7/17/2025    Providers Performing:   Surgeon(s):  Kar Trevino III, MD  Assistant: SMA Joanna    Preoperative Diagnosis:   Right hip osteoarthritis, M16.11    Postoperative Diagnosis:   Same    Operative Procedure:   Right Total Hip Arthroplasty, Anterior Approach, CPT 02289    Anesthesia:   Spinal+catheter  PRETTY cocktail: JANET    Estimated Blood Loss:   350 cc     Specimens:   None    Complications:   None, stable to PACU.    Implants Utilized:   Depuy  Emphasys three-hole acetabular shell; Size 48  Emphasys AOX polyethylene liner; 48 OD, 36 ID, neutral  Actis size 2 STD  Biolox Delta Ceramic 12/14 taper 36mm + 8.5  Acetabular screw 15mm, 25mm    [place]    Implant Name Type Inv. Item Serial No.  Lot No. LRB No. Used Action   SHELL EMPHASYS ACET 3H 48MM - [id number]  SHELL EMPHASYS ACET 3H 48MM  [employer] [id number] Right 1 Implanted   SCREW PINNACLE 6.5 X 25 - [id number]  SCREW PINNACLE 6.5 X 25  [employer] [id number] Right 1 Implanted   SCREW BONE CANCACT 6.5X15 - [id number]  SCREW BONE CANCACT 6.5X15  [employer] [id number] Right 1 Implanted   LINER EMPHASYS NEUT 92B42TA - [id number]  LINER EMPHASYS NEUT [id number]  [employer] [id number] Right 1 Implanted   HEAD CERAMAX 12/14 +8.5 36MM - [id number]  HEAD CERAMAX 12/14 +8.5 36MM  SYNTHES [id number] Right 1 Implanted   STEM ACTIS FEM COLLARED STD 2 - [id number]  STEM ACTIS FEM COLLARED STD 2  SY[employer]ES [id number] Right 1 Implanted       Indications:   The patient has longstanding right hip pain secondary to the above diagnosis.. They have failed conservative management which includes anti-inflammatory medications and activity modification. We reviewed the risks and benefits of the direct anterior hip replacement with the patient prior to surgery including risk of infection, lateral thigh numbness, blood clot, leg length inequality, dislocation, components loosening, components wearing out, need for  revision surgery, continued pain, limping, and injury to nerves and vessels.  After discussing the risks and benefits of the procedure they would like to proceed with surgery.    Operative Notes:   The patient was met in the holding area. The operative site was marked. Anesthesia administered spinal anesthesia. The patient was placed supine on a [place] table and the operative site was identified. The hip was prepped and draped in the usual fashion. IV antibiotics were administered and a timeout was performed.     I performed a direct anterior approach to the hip. Bikini Skin incision was made and the fascia of the tensor fascia raffaele was identified. I utilized the interval between the tensor fascia raffaele and sartorious for direct dissection to the hip joint. I identified and coagulated the ascending branch of the lateral circumflex vessel. Standard retractors over the anterior hip capsule were placed and the capsulotomy was performed. The capsule was tagged for retraction. The femoral head was identified and the femoral neck osteotomy was made in accordance with preoperative templating. The femoral head was then removed with a corkscrew.    The standard anterior, posterior, and superior retractors were placed around the acetabulum. The capsular labrum and foveal contents were removed. Sequential acetabular reamers were used to prepare the acetabulum. Once good good fit was achieved, the final acetabular cup was selected to be one millimeter larger in diameter than the last reamer used. The cup was checked for a good rim fit and a provisional impaction was performed to verify positioning on fluoroscopy. Once this was satisfactory, the impaction was completed. I checked to make sure there was no overhanging osteophytes anteriorly as well as making sure that there was not excessive acetabular cup exposed. Screws were placed in the cup to augment initial fixation. The final liner was impacted into place and I confirmed that  it was well seated.    The hydraulic hook was placed around the femur. The femur was externally rotated and the standard calcar and greater trochanter retractors were placed. A provisional posterior capsulotomy was performed. Then, gross traction was released and the leg was extended and adducted. The appropriate release was performed to allow elevation of the femur for good visualization of the femoral canal.     A box osteotome was used to open the femoral canal and a canal finder was used to sound the canal. The starter broach and sequential broaches were used until stability was appropriate. A trial reduction was performed and intraoperative fluoroscopy was used to confirm appropriate leg lengths and offset.  Once the trial femoral components appeared appropriately positioned, the hip was dislocated, the femur re-exposed, and the trial femoral components were removed. The canal was irrigated and the final femoral stem was impacted to the level of the neck cut. The final ball was impacted onto a dry trunnion and the hip was reduced checking for appropriate soft tissue tension. Final intra-operative fluoroscopy was obtained to confirm implant positioning, leg length, and offset.     While checking xray, a 0.35% betadine solution was left to soak in the wound. The wound was copiously irrigated. I checked for any residual bleeders and made sure that there was appropriate hemostasis. The local anesthetic cocktail was administered. 1 gram of vancomycin powder was placed in the wound. The wound was closed in layers using #1 vicryl at the fascia, then 2-0 vicryl, 3-0 monocryl, 4-0 monocryl and Prineo Mesh+Dermabond. A sterile dressing was placed.     There were no complications or evidence of intraoperative fracture. All counts were correct.    The first-assistant was critical to all steps of the operation, including retraction during exposure and bone preparation, as well as the deep and superficial wound closure.    Uriel performed and/or supervised the key portions of this surgical procedure, including evaluation of the bone cuts, reshaping of the bony elements, and insertion of the provisional and final components.    Post Op Plan:   The patient will be weightbearing as tolerated with a walker with no extremes of motion  ASA for DVT prophylaxis (81mg BID for one month)  24 hours of IV antibiotics.    Nutrition support  Pour/bethanechol  Hold HRT 2-4 weeks    Due patient and or surgical factors the patient will receive an Rx for cefadroxil 500mg BID x7 days after discharge per [place] data:   Iza MM, David [place], Marcia WOODWARD, [place] RINCON. The [facility] Clinical Research Award: Extended Oral Antibiotics Prevent Periprosthetic Joint Infection in High-Risk Cases: 3855 Patients With 1-Year Follow-Up. J Arthroplasty. 2021 Jul;36(7S):S18-S25. doi: 10.1016/j.arth.2021.01.051. Epub 2021 Jan 23. PMID: 1970.    Signed by: Kar Trevino III, MD

## 2025-07-17 NOTE — PLAN OF CARE
Care plan reviewed w/ pt and spouse at bedside. AVSS on RA. R hip dressing CDI. Pain controlled w/ PRN medications. Spinal resolved, pt able to move BLE. Report given to Tiffany in [facility]. Pt transferred to room 316.

## 2025-07-18 VITALS
RESPIRATION RATE: 16 BRPM | BODY MASS INDEX: 26.06 KG/M2 | OXYGEN SATURATION: 99 % | SYSTOLIC BLOOD PRESSURE: 115 MMHG | TEMPERATURE: 97 F | HEART RATE: 56 BPM | WEIGHT: 166 LBS | DIASTOLIC BLOOD PRESSURE: 60 MMHG | HEIGHT: 67 IN

## 2025-07-18 LAB
BUN SERPL-MCNC: 15 MG/DL (ref 6–30)
CHLORIDE SERPL-SCNC: 108 MMOL/L (ref 95–110)
CREAT SERPL-MCNC: 0.8 MG/DL (ref 0.5–1.4)
GLUCOSE SERPL-MCNC: 109 MG/DL (ref 70–110)
HCT VFR BLD CALC: 34 %PCV (ref 36–54)
POC IONIZED CALCIUM: 0.86 MMOL/L (ref 1.06–1.42)
POC TCO2 (MEASURED): 19 MMOL/L (ref 23–29)
POTASSIUM BLD-SCNC: 4.3 MMOL/L (ref 3.5–5.1)
SAMPLE: ABNORMAL
SODIUM BLD-SCNC: 135 MMOL/L (ref 136–145)

## 2025-07-18 PROCEDURE — 94761 N-INVAS EAR/PLS OXIMETRY MLT: CPT | Mod: XB

## 2025-07-18 PROCEDURE — 84295 ASSAY OF SERUM SODIUM: CPT

## 2025-07-18 PROCEDURE — 25000003 PHARM REV CODE 250: Performed by: STUDENT IN AN ORGANIZED HEALTH CARE EDUCATION/TRAINING PROGRAM

## 2025-07-18 PROCEDURE — 82330 ASSAY OF CALCIUM: CPT

## 2025-07-18 PROCEDURE — 99900035 HC TECH TIME PER 15 MIN (STAT)

## 2025-07-18 PROCEDURE — 97116 GAIT TRAINING THERAPY: CPT

## 2025-07-18 PROCEDURE — 63600175 PHARM REV CODE 636 W HCPCS

## 2025-07-18 PROCEDURE — 25000003 PHARM REV CODE 250

## 2025-07-18 PROCEDURE — 97530 THERAPEUTIC ACTIVITIES: CPT

## 2025-07-18 PROCEDURE — 83605 ASSAY OF LACTIC ACID: CPT

## 2025-07-18 PROCEDURE — 85014 HEMATOCRIT: CPT

## 2025-07-18 PROCEDURE — 25000003 PHARM REV CODE 250: Performed by: ORTHOPAEDIC SURGERY

## 2025-07-18 PROCEDURE — 97110 THERAPEUTIC EXERCISES: CPT

## 2025-07-18 PROCEDURE — 84132 ASSAY OF SERUM POTASSIUM: CPT

## 2025-07-18 PROCEDURE — 82803 BLOOD GASES ANY COMBINATION: CPT

## 2025-07-18 PROCEDURE — 97535 SELF CARE MNGMENT TRAINING: CPT

## 2025-07-18 RX ORDER — CELECOXIB 200 MG/1
200 CAPSULE ORAL DAILY
Status: DISCONTINUED | OUTPATIENT
Start: 2025-07-18 | End: 2025-07-18 | Stop reason: HOSPADM

## 2025-07-18 RX ADMIN — CELECOXIB 200 MG: 200 CAPSULE ORAL at 08:07

## 2025-07-18 RX ADMIN — ASPIRIN 81 MG: 81 TABLET, COATED ORAL at 08:07

## 2025-07-18 RX ADMIN — ESCITALOPRAM OXALATE 10 MG: 10 TABLET ORAL at 08:07

## 2025-07-18 RX ADMIN — METHOCARBAMOL 750 MG: 750 TABLET ORAL at 08:07

## 2025-07-18 RX ADMIN — POLYETHYLENE GLYCOL 3350 17 G: 17 POWDER, FOR SOLUTION ORAL at 08:07

## 2025-07-18 RX ADMIN — MUPIROCIN 1 G: 20 OINTMENT TOPICAL at 08:07

## 2025-07-18 RX ADMIN — CEFAZOLIN 2 G: 2 INJECTION, POWDER, FOR SOLUTION INTRAMUSCULAR; INTRAVENOUS at 03:07

## 2025-07-18 RX ADMIN — ACETAMINOPHEN 1000 MG: 500 TABLET ORAL at 12:07

## 2025-07-18 RX ADMIN — SENNOSIDES AND DOCUSATE SODIUM 1 TABLET: 50; 8.6 TABLET ORAL at 08:07

## 2025-07-18 RX ADMIN — ACETAMINOPHEN 1000 MG: 500 TABLET ORAL at 08:07

## 2025-07-18 RX ADMIN — FAMOTIDINE 20 MG: 20 TABLET, FILM COATED ORAL at 08:07

## 2025-07-18 RX ADMIN — OXYCODONE HYDROCHLORIDE 5 MG: 5 TABLET ORAL at 12:07

## 2025-07-18 NOTE — DISCHARGE SUMMARY
[facility]  Orthopedics  Discharge Summary      Patient Name: Naima Jeffries  MRN: [id number]  Admission Date: 7/17/2025  Hospital Length of Stay: 0 days  Discharge Date and Time: 07/18/2025 8:01 AM  Attending Physician: Kar Trevino III, MD   Discharging Provider: Feliciano Jones MD  Primary Care Provider: Isela Florian MD    HPI:   Naima Jeffries is a 54 y.o. female with Right hip pain.  Pain is worse with activity and weight bearing.  Patient has experienced interference of activities of daily living due to increased pain and decreased range of motion. Patient has failed non-operative treatment including NSAIDs, as well as greater than 3 months of activity modification. Naima Jeffries ambulates independently     Procedure(s) (LRB):  ARTHROPLASTY, HIP, TOTAL, ANTERIOR APPROACH: RIGHT: DEPUY - ACTIS + EMPHASYS (Right)      Hospital Course:  On 7/17/25, the patient arrived to the [place] pre-operative holding area for proper pre-operative management.  Upon completion of pre-operative preparation, the patient was taken back to the operative theatre. R DEANDRE was performed without complication and the patient was transported to the post anesthesia care unit in stable condition.  After appropriate recovery from the anaesthetic agents used during the surgery, the patient was then transported to the hospital inpatient floor.  The interim of the hospital stay from arrival on the floor up to discharge has been uncomplicated. The patient has tolerated regular diet.  The patient's pain has been controlled using a multimodal approach. Currently, the patient's pain is well controlled on an oral regimen.  The patient has been voiding without difficulty.  The patient began participation in physical therapy after surgery and has progressed throughout the entire hospital stay.  Currently, the patient's progress is sufficient to allow the them to be discharged to home safely.  The patient agrees with this  assessment and desires a discharge today.      Goals of Care Treatment Preferences:         Consults (From admission, onward)          Status Ordering Provider     Inpatient consult to Social Work  Once        Provider:  (Not yet assigned)    Acknowledged RASHAUN MTZ     Inpatient consult to Respiratory Care  Once        Provider:  (Not yet assigned)    Acknowledged RASHAUN MTZ            Significant Diagnostic Studies: No pertinent studies.    Pending Diagnostic Studies:       None          Final Active Diagnoses:    Diagnosis Date Noted POA    PRINCIPAL PROBLEM:  Primary osteoarthritis of right hip [M16.11] 06/24/2025 Yes      Problems Resolved During this Admission:      Discharged Condition: good    Disposition: Home or Self Care    Follow Up:    Patient Instructions:      Activity as tolerated     Lifting restrictions   Order Comments: No strenuous exercise or lifting of > 10 lbs     Weight bearing as tolerated     No driving, operating heavy equipment or signing legal documents while taking pain medication     Leave dressing on - Keep it clean, dry, and intact until clinic visit   Order Comments: Do not remove surgical dressing for 2 weeks post-op. This will be done only by MD at initial post-op visit. If dressing is completely saturated, replace with identical dressing - silver-impregnated hydrocolloid dressing.    Do not get dressings wet. Do not shower.    If dressing continues to be saturated or there are signs of infection, please call [facility] [phone] for further instructions and to make appt to be seen.     Call MD for: fluid/liquid/drainage on dressing     Call MD for:  temperature >100.4     Call MD for:  persistent nausea and vomiting     Call MD for:  severe uncontrolled pain     Call MD for:  difficulty breathing, headache or visual disturbances     Call MD for:  redness, tenderness, or signs of infection (pain, swelling, redness, odor or green/yellow discharge around incision  site)     Call MD for:  hives     Call MD for:  persistent dizziness or light-headedness     Call MD for:  extreme fatigue     Ok to shower at home, running water only, towel dry, use shower chair for safety, no soaking in a tub or pool for one month.     Medications:  Reconciled Home Medications:      Medication List        PAUSE taking these medications      estradioL 0.025 mg/24 hr  Wait to take this until: July 31, 2025  Hold 2-4 weeks post op to prevent blood clots/DVT  Commonly known as: VIVELLE-DOT  Place 1 patch onto the skin twice a week.     meloxicam 15 MG tablet  Wait to take this until: August 14, 2025  Hold 30 days (resume when you finish celebrex Rx)  Commonly known as: MOBIC  Take 1 tablet (15 mg total) by mouth once daily. Additional refills should come from primary care provider     progesterone 100 MG capsule  Wait to take this until: July 31, 2025  Hold 2-4 weeks post op to prevent blood clots/DVT  Commonly known as: PROMETRIUM  Take 1 capsule (100 mg total) by mouth nightly.            CONTINUE taking these medications      acetaminophen 650 MG Tbsr  Commonly known as: TYLENOL  Take 1 tablet (650 mg total) by mouth every 8 (eight) hours.     ascorbic acid (vitamin C) 500 MG tablet  Commonly known as: VITAMIN C  Take 2 tablets (1,000 mg total) by mouth 2 (two) times daily. for 14 days     aspirin 81 MG EC tablet  Commonly known as: ECOTRIN  Take 1 tablet (81 mg total) by mouth 2 (two) times a day.     b complex vitamins tablet  Take 1 tablet by mouth once daily.     cefadroxil 500 MG Cap  Commonly known as: DURICEF  Take 1 capsule (500 mg total) by mouth every 12 (twelve) hours. for 7 days     celecoxib 200 MG capsule  Commonly known as: CeleBREX  Take 1 capsule (200 mg total) by mouth once daily.     ENSURE  Generic drug: food supplemt, lactose-reduced  Drink one bottle daily for 14 days.     EScitalopram oxalate 10 MG tablet  Commonly known as: LEXAPRO  Take 1 tablet (10 mg total) by mouth once  daily.     HAIR VITAMINS ORAL  Take by mouth. Nutrafol     GISELE (WITH COLLAGEN) 7-7-1.5 gram Pwpk  Generic drug: uvifz-fgol-ZsDQO-collag-mv-min  Take 1 packet by mouth once daily.     ketoconazole 2 % shampoo  Commonly known as: NIZORAL  Wash hair with medicated shampoo at least 2x/month- let sit on scalp at least 5 minutes prior to rinsing     magnesium 200 mg Tab  Take by mouth once.     methocarbamoL 750 MG Tab  Commonly known as: ROBAXIN  Take 1 tablet (750 mg total) by mouth 4 (four) times daily as needed (for muscle spasms).     multivitamin per tablet  Commonly known as: THERAGRAN  Take 1 tablet by mouth once daily.     oxyCODONE 5 MG immediate release tablet  Commonly known as: ROXICODONE  Take 1 tablet every 4-6 hours as needed for pain     pantoprazole 40 MG tablet  Commonly known as: PROTONIX  Take 1 tablet (40 mg total) by mouth once daily.     polyethylene glycol 17 gram/dose powder  Commonly known as: GLYCOLAX  Take 17 g by mouth once daily.     UNABLE TO FIND  medication name: Testosterone 1% Cream Apply 1 click to the upper inner thigh daily              Feliciano Jones MD  Orthopedics  [facility])

## 2025-07-18 NOTE — PROGRESS NOTES
Acute Pain Service and Perioperative Surgical Home Progress Note    Interval history  Naima Jeffries is a 54 y.o., female, status post arthroplasty of the right hip with no acute events overnight.  On exam patient is doing well, good sensation throughout the leg, good quad strength.  Surgical site is clean and dry.  Patient tolerating p.o. and voiding well.    Surgery:  Procedure(s) (LRB):  ARTHROPLASTY, HIP, TOTAL, ANTERIOR APPROACH: RIGHT: DEPUY - ACTIS + EMPHASYS (Right)    Post Op Day #: 1    Problem List:    Active Hospital Problems    Diagnosis  POA    *Primary osteoarthritis of right hip [M16.11]  Yes      Resolved Hospital Problems   No resolved problems to display.       Subjective:       General appearance of alert, oriented, no complaints   Pain with rest: 2    Numbers   Pain with movement: 4    Numbers   Side Effects    1. Pruritis No    2. Nausea No    3. Motor Blockade No, 0=Ability to raise lower extremities off bed    4. Sedation No, 1=awake and alert    Schedule Medications:    acetaminophen  1,000 mg Oral Q6H    aspirin  81 mg Oral BID    celecoxib  200 mg Oral Daily    electrolytes-dextrose  400 mL Oral Q4H    EScitalopram oxalate  10 mg Oral Daily    famotidine  20 mg Oral BID    methocarbamoL  750 mg Oral TID    mupirocin  1 g Nasal BID    polyethylene glycol  17 g Oral Daily    pregabalin  75 mg Oral QHS    senna-docusate  1 tablet Oral BID        Continuous Infusions:       PRN Medications:    Current Facility-Administered Medications:     morphine, 2 mg, Intravenous, Q3H PRN    naloxone, 0.02 mg, Intravenous, PRN    ondansetron, 4 mg, Intravenous, Q8H PRN    oxyCODONE, 5 mg, Oral, Q3H PRN    oxyCODONE, 10 mg, Oral, Q3H PRN    polyethylene glycol, 17 g, Oral, Daily PRN    prochlorperazine, 5 mg, Intravenous, Q6H PRN       Antibiotics:  Antibiotics (From admission, onward)      Start     Stop Route Frequency Ordered    07/17/25 2100  mupirocin 2 % ointment 1 g         07/22/25 2059 Nasl 2 times  "daily 07/17/25 1401               Objective:         Vital Signs (Most Recent):  Temp: 97.1 °F (36.2 °C) (07/18/25 0332)  Pulse: (!) 52 (07/18/25 0332)  Resp: 17 (07/18/25 0332)  BP: 113/64 (07/18/25 0332)  SpO2: 98 % (07/18/25 0332) Vital Signs Range (Last 24H):  Temp:  [97.1 °F (36.2 °C)-98.3 °F (36.8 °C)]   Pulse:  [52-73]   Resp:  [14-18]   BP: ()/(56-81)   SpO2:  [98 %-100 %]          I & O (Last 24H):  Intake/Output Summary (Last 24 hours) at 7/18/2025 0517  Last data filed at 7/18/2025 0316  Gross per 24 hour   Intake 4020 ml   Output 3750 ml   Net 270 ml       Physical Exam:    GA: Alert, comfortable, no acute distress.   Pulmonary: Clear to auscultation . Normal respiratory ratei.  Cardiac: regular rate and rhythm.          Laboratory: reviewed in chart  CBC:   Recent Labs     07/18/25  0356   HCT 34*       BMP: No results for input(s): "NA", "K", "CO2", "CL", "BUN", "CREATININE", "GLU", "MG", "PHOS", "CALCIUM" in the last 72 hours.    No results for input(s): "PT", "INR", "PROTIME", "APTT" in the last 72 hours.          Assessment:         Pain control adequate    Plan:  1) Pain: Multimodal pain regimen ordered which includes acetaminophen, celecoxib, pregabalin, and prn oxycodone.  Well controlled on current regimen.  Will continue to monitor.     2) FEN/GI: Tolerating regular diet.     3) Dispo: Pt working well with PT/OT. Case management and SW following along for setting up home health and physical therapy. Plan to discharge home this am.              Evaluator Steven Monet PA-C                "

## 2025-07-18 NOTE — PT/OT/SLP PROGRESS
"Physical Therapy Treatment and Discharge    Patient Name:  Niama Jeffries   MRN:  [id number]    Recommendations:     Discharge Recommendations: Low Intensity Therapy  Discharge Equipment Recommendations: shower chair, bedside commode  Barriers to discharge: None    Assessment:     Naima Jeffries is a 54 y.o. female admitted with a medical diagnosis of Primary osteoarthritis of right hip.  She presents with the following impairments/functional limitations: weakness, impaired endurance, impaired self care skills, impaired functional mobility, gait instability, pain, decreased ROM, edema, orthopedic precautions. Patient tolerated PT session well. Patient ambulated 200ft x2 trials with RW and supervision. No LOB or SOB noted. Patient ascended/descended 4 steps with left handrail and contact guard assistance. Patient ascended/descended 6" step with RW and contact guard assistance. Patient performed R LE therex x10 reps. Patient educated in anterior hip precautions. Patient ready to discharge home from PT standpoint.      Rehab Prognosis: Good; patient would benefit from acute skilled PT services to address these deficits and reach maximum level of function.    Recent Surgery: Procedure(s) (LRB):  ARTHROPLASTY, HIP, TOTAL, ANTERIOR APPROACH: RIGHT: DEPUY - ACTIS + EMPHASYS (Right) 1 Day Post-Op    Plan:     During this hospitalization, patient to be seen daily to address the identified rehab impairments via gait training, therapeutic activities, therapeutic exercises and progress toward the following goals:    Plan of Care Expires:  07/18/25    Subjective     Chief Complaint: Right hip pain.   Patient/Family Comments/goals: To be get back to pilates.   Pain/Comfort:  Pain Rating 1: 4/10  Location - Side 1: Right  Location 1: hip  Pain Addressed 1: Pre-medicate for activity, Reposition, Distraction      Objective:     Communicated with RN prior to session. Patient found up in chair with  (no active lines) upon PT entry to room. "     General Precautions: Standard, fall  Orthopedic Precautions: RLE weight bearing as tolerated, RLE anterior precautions (Anterior total hip, WBAT with walker, no extremes of motion, hip flexion 0-90 degrees.)  Braces: N/A  Respiratory Status: Room air     Functional Mobility:  Mat Mobility:     Supine to Sit: supervision  Sit to Supine: supervision using sheet for right leg lift  Transfers:     Sit to Stand:  supervision with rolling walker x2 from bedside chair and x1 from mat   Gait: Patient ambulated 200ft x2 trials with Rolling Walker and supervision. Patient demonstrated decreased sukhdeep and decreased step length during gait due to pain, decreased ROM, and decreased strength.  Stairs:  Patient ascended/descended 4 steps with left handrail and contact guard assistance.    AM-PAC 6 CLICK MOBILITY  Turning over in bed (including adjusting bedclothes, sheets and blankets)?: 4  Sitting down on and standing up from a chair with arms (e.g., wheelchair, bedside commode, etc.): 4  Moving from lying on back to sitting on the side of the bed?: 4  Moving to and from a bed to a chair (including a wheelchair)?: 4  Need to walk in hospital room?: 4  Climbing 3-5 steps with a railing?: 3  Basic Mobility Total Score: 23     Treatment & Education:  Patient performed R LE therex x10 reps for heel slides, SAQ over bolster, A/P, quad set, and glute set all within anterior hip precautions.      Patient educated in:  -PT role and POC  -anterior hip precautions   -safety with transfers including hand placement  -gait sequencing and RW management  -out of bed activity to maximize recovery including ambulating at home to prevent DVT   -SUV transfer  -curb and stair training  -HEP for therex at home with handout provided     Patient left up in chair with all lines intact, call button in reach, and RN notified.    GOALS:   Multidisciplinary Problems       Physical Therapy Goals          Problem: Physical Therapy    Goal Priority  Disciplines Outcome Interventions   Physical Therapy Goal     PT, PT/OT Progressing    Description: Goals to be met by: 25     Patient will increase functional independence with mobility by performin. Supine to sit with Supervision  2. Sit to stand transfer with Supervision  3. Gait  x 150 feet with Supervision using Rolling Walker.   4. Ascend/Descend 6 inch curb step with Stand-by Assistance using Rolling Walker.  5. Lower extremity ant DEANDRE home exercise program x 30 reps per handout, with supervision                       Time Tracking:     PT Received On: 25  PT Start Time: 1022     PT Stop Time: 1050  PT Total Time (min): 28 min     Billable Minutes: Gait Training 18 and Therapeutic Exercise 10    Treatment Type: Treatment  PT/PTA: PT     Number of PTA visits since last PT visit: 0     2025

## 2025-07-18 NOTE — PLAN OF CARE
Problem: Adult Inpatient Plan of Care  Goal: Absence of Hospital-Acquired Illness or Injury  Outcome: Met  Intervention: Identify and Manage Fall Risk  Flowsheets (Taken 7/18/2025 1305)  Safety Promotion/Fall Prevention:   Fall Risk reviewed with patient/family   gait belt with ambulation   instructed to call staff for mobility   lighting adjusted   medications reviewed   in recliner, wheels locked   muscle strengthening facilitated   nonskid shoes/socks when out of bed   side rails raised x 2  Intervention: Prevent Skin Injury  Flowsheets (Taken 7/18/2025 1305)  Skin Protection: transparent dressing maintained  Device Skin Pressure Protection: absorbent pad utilized/changed  Intervention: Prevent and Manage VTE (Venous Thromboembolism) Risk  Flowsheets (Taken 7/18/2025 1305)  VTE Prevention/Management:   remove, assess skin, and reapply sequential compression device   ambulation promoted   bleeding precautions maintained   bleeding risk assessed   bleeding risk factor(s) identified, provider notified   remove, assess skin, and reapply compression stockings   dorsiflexion/plantar flexion performed  Intervention: Prevent Infection  Flowsheets (Taken 7/18/2025 1305)  Infection Prevention: hand hygiene promoted  Goal: Optimal Comfort and Wellbeing  Outcome: Met  Intervention: Monitor Pain and Promote Comfort  Flowsheets (Taken 7/18/2025 1305)  Pain Management Interventions:   cold applied   medication offered but refused   relaxation techniques promoted   medication offered   pillow support provided   prescribed exercises encouraged   warm blanket provided  Intervention: Provide Person-Centered Care  Flowsheets (Taken 7/18/2025 1305)  Trust Relationship/Rapport:   care explained   reassurance provided   thoughts/feelings acknowledged  Goal: Readiness for Transition of Care  Outcome: Met     Problem: Pain Acute  Goal: Optimal Pain Control and Function  Outcome: Met  Intervention: Develop Pain Management  Plan  Flowsheets (Taken 7/18/2025 1305)  Pain Management Interventions:   cold applied   medication offered but refused   relaxation techniques promoted   medication offered   pillow support provided   prescribed exercises encouraged   warm blanket provided  Intervention: Prevent or Manage Pain  Flowsheets (Taken 7/18/2025 1305)  Sensory Stimulation Regulation: auditory stimulation minimized  Medication Review/Management:   medications reviewed   provider consulted  Intervention: Optimize Psychosocial Wellbeing  Flowsheets (Taken 7/18/2025 1305)  Supportive Measures:   active listening utilized   positive reinforcement provided   verbalization of feelings encouraged   goal-setting facilitated   guided imagery facilitated     Problem: Hip Arthroplasty  Goal: Optimal Coping  Outcome: Met  Goal: Effective Bowel Elimination  Outcome: Met  Goal: Fluid and Electrolyte Balance  Outcome: Met  Goal: Optimal Functional Ability  Outcome: Met  Goal: Absence of Infection Signs and Symptoms  Outcome: Met  Goal: Intact Neurovascular Status  Outcome: Met  Goal: Anesthesia/Sedation Recovery  Outcome: Met  Goal: Acceptable Pain Control  Outcome: Met  Goal: Nausea and Vomiting Relief  Outcome: Met  Goal: Effective Urinary Elimination  Outcome: Met  Intervention: Monitor and Manage Urinary Retention  Flowsheets (Taken 7/18/2025 1305)  Urinary Elimination Promotion: frequent voiding encouraged  Goal: Effective Oxygenation and Ventilation  Outcome: Met     Problem: Wound  Goal: Optimal Coping  Outcome: Met  Goal: Optimal Functional Ability  Outcome: Met  Goal: Absence of Infection Signs and Symptoms  Outcome: Met  Goal: Improved Oral Intake  Outcome: Met  Goal: Optimal Pain Control and Function  Outcome: Met  Goal: Skin Health and Integrity  Outcome: Met  Goal: Optimal Wound Healing  Outcome: Met     Problem: Infection  Goal: Absence of Infection Signs and Symptoms  Outcome: Met  Intervention: Prevent or Manage Infection  Flowsheets  (Taken 7/18/2025 1305)  Fever Reduction/Comfort Measures: fluid intake increased     Problem: Comorbidity Management  Goal: Maintenance of Behavioral Health Symptom Control  Outcome: Met  Intervention: Maintain Behavioral Health Symptom Control  Flowsheets (Taken 7/18/2025 1305)  Medication Review/Management:   medications reviewed   provider consulted  Goal: Blood Pressure in Desired Range  Outcome: Met   Plan of care reviewed with patient; verbalized understanding. No further concerns as of present.  Medications reviewed and administered as ordered.Throughout shift, pt remained oriented x 4, calm, respirations even and unlabored w/ no SOB.  Safety precautions maintained throughout shift, rounding for safety and patient care per policy, remained free of falls/injury. Pt voiding w/o difficulty. Pain assessment completed Q2H, pain managed w/meds as ordered. Patient working appropriately with PT/OT.  DME at bedside.  Call light within reach, bed wheels locked, bed in lowest position, side rails up x2, safety maintained. NADN, prepared for discharge.

## 2025-07-18 NOTE — PT/OT/SLP PROGRESS
"Occupational Therapy   Treatment and Discharge Note    Name: Naima Jeffries  MRN: [id number]  Admitting Diagnosis:  Primary osteoarthritis of right hip  1 Day Post-Op    Recommendations:     Discharge Recommendations: Low Intensity Therapy  Discharge Equipment Recommendations:  bedside commode, shower chair, walker, rolling  Barriers to discharge:  None    Assessment:     Naima Jeffries is a 54 y.o. female with a medical diagnosis of Primary osteoarthritis of right hip.  She presents with R DEANDRE. Performance deficits affecting function are impaired self care skills, impaired functional mobility, orthopedic precautions. Pt was able to perform supine/sit T/F c S and Sit <> Stand Transfer with modified independence with rolling walker  Toilet Transfer Stand Pivot technique with modified independence with rolling walker and bedside commode.  Able to perform UB/LB dressing c modified independence c AE. Educated pt on bathing, car transfers, and cryotherapy.       Rehab Prognosis:  Good; patient would benefit from acute skilled OT services to address these deficits and reach maximum level of function.       Plan:     Patient to be seen daily to address the above listed problems via self-care/home management, therapeutic exercises, therapeutic activities  Plan of Care Expires: 07/18/25  Plan of Care Reviewed with: patient    Subjective     Chief Complaint: R DEANDRE  Patient/Family Comments/goals: "I am ready."  Pain/Comfort:  Pain Rating 1: 5/10    Objective:     Communicated with: RN prior to session.  Patient found supine with cryotherapy, FCD upon OT entry to room.    General Precautions: Standard, fall    Orthopedic Precautions:RLE weight bearing as tolerated, RLE anterior precautions  Braces: N/A  Respiratory Status: Room air     Occupational Performance:     Bed Mobility:    Patient completed Supine to Sit with supervision     Functional Mobility/Transfers:  Patient completed Sit <> Stand Transfer with contact guard assistance "  with  rolling walker   Patient completed Bed <> Chair Transfer using Stand Pivot technique with contact guard assistance with rolling walker  Patient completed Toilet Transfer Stand Pivot technique with contact guard assistance with  rolling walker and bedside commode  Functional Mobility: Pt was able to walk from bed to bathroom and then to chair c CGA and RW.    Activities of Daily Living:  Grooming: modified independence to wash hands while standing at sink c RW.  Upper Body Dressing: modified independence to don shirt.  Lower Body Dressing: modified independence to don underwear, pants, socks, and shoes and reacher, sock-aid, and long handled shoe horn.  Toileting: modified independence for toilet hygiene.      [facility] 6 Click ADL: 22    Patient left up in chair with all lines intact, call button in reach, and RN notified    GOALS:   Multidisciplinary Problems       Occupational Therapy Goals          Problem: Occupational Therapy    Goal Priority Disciplines Outcome Interventions   Occupational Therapy Goal     OT, PT/OT Progressing    Description: Goals to be met by: 7/18/25     Patient will increase functional independence with ADLs by performing:    UE Dressing with Modified Wells.  LE Dressing with Modified Wells and Assistive Devices as needed.  Grooming while standing at sink with Modified Wells.  Toileting from bedside commode with Modified Wells for hygiene and clothing management.   Bathing from  shower chair/bench with Modified Wells.  Toilet transfer to bedside commode with Modified Wells.  Pt will state all hip precautions correctly.                         DME Justifications:  No DME recommended requiring DME justifications    Time Tracking:     OT Date of Treatment: 07/18/25  OT Start Time: 0917  OT Stop Time: 0951  OT Total Time (min): 34 min    Billable Minutes:Self Care/Home Management 17  Therapeutic Activity 17               7/18/2025

## 2025-07-18 NOTE — PLAN OF CARE
Problem: Adult Inpatient Plan of Care  Goal: Plan of Care Review  Outcome: Met  Flowsheets (Taken 7/18/2025 0542)  Plan of Care Reviewed With: patient  Goal: Patient-Specific Goal (Individualized)  Outcome: Met  Flowsheets (Taken 7/18/2025 0542)  Individualized Care Needs: Pain management  Anxieties, Fears or Concerns: None  Patient/Family-Specific Goals (Include Timeframe): Keep patient and boyfriend updated on Plan of Care     Problem: Pain Acute  Goal: Optimal Pain Control and Function  Outcome: Progressing  Intervention: Develop Pain Management Plan  Flowsheets (Taken 7/18/2025 0542)  Pain Management Interventions:   care clustered   cold applied   diversional activity provided   medication offered   pain management plan reviewed with patient/caregiver   position adjusted   quiet environment facilitated   relaxation techniques promoted  Intervention: Prevent or Manage Pain  Flowsheets (Taken 7/18/2025 0542)  Sleep/Rest Enhancement:   awakenings minimized   consistent schedule promoted   noise level reduced   regular sleep/rest pattern promoted   relaxation techniques promoted   room darkened  Sensory Stimulation Regulation:   auditory stimulation minimized   quiet environment promoted   visual stimulation minimized   tactile stimulation minimized   care clustered   lighting decreased  Bowel Elimination Promotion: adequate fluid intake promoted  Medication Review/Management:   medications reviewed   high-risk medications identified     Problem: Hip Arthroplasty  Goal: Absence of Bleeding  Outcome: Met  Intervention: Monitor and Manage Bleeding  Flowsheets (Taken 7/18/2025 0542)  Bleeding Management: dressing monitored     Problem: Comorbidity Management  Goal: Maintenance of Behavioral Health Symptom Control  Outcome: Progressing  Intervention: Maintain Behavioral Health Symptom Control  Flowsheets (Taken 7/18/2025 0542)  Medication Review/Management:   medications reviewed   high-risk medications identified      Problem: Fall Injury Risk  Goal: Absence of Fall and Fall-Related Injury  Outcome: Met  Intervention: Identify and Manage Contributors  Flowsheets (Taken 7/18/2025 6278)  Self-Care Promotion:   BADL personal objects within reach   BADL personal routines maintained   adaptive equipment use encouraged  Medication Review/Management:   medications reviewed   high-risk medications identified

## 2025-07-19 PROCEDURE — G0180 MD CERTIFICATION HHA PATIENT: HCPCS | Mod: ,,, | Performed by: ORTHOPAEDIC SURGERY

## 2025-07-22 ENCOUNTER — CLINICAL SUPPORT (OUTPATIENT)
Dept: ORTHOPEDICS | Facility: CLINIC | Age: 55
End: 2025-07-22
Payer: COMMERCIAL

## 2025-07-22 DIAGNOSIS — Z96.641 S/P HIP REPLACEMENT, RIGHT: Primary | ICD-10-CM

## 2025-07-22 NOTE — PROGRESS NOTES
I called the patient today regarding her  surgery with Dr. Trevino. The patient had a right anterior DEANDRE on 7/17/25.     Pain Scale: 3 / 10    Any issues with Fever: No.    Any issues with medications (specifically DVT prophylaxis): No.    Pain medication: no;  Celebrex : no  Protonix : no  Resume home meds : Yes    Any issues with:   Bowel movements: No.  Passing jason: No.  Urination: No.    Completing at home exercises: Yes    Any concerns regarding their dressing/bandage:  No.    Patient confirmed first HH-PT appointment:  yes on  7/21/25      Any other concerns: No.    Blue Bracelet : yes    The patient was informed that if they have any urgent issues with their bandage, medications or any other health concerns regarding their surgery to call the 24/7 Orthopedic Post-op Hot Line at [phone]. The patient was reminded that if they have any chest pain or shortness of breath to call 911 or go to the ER.    The patient verbalized understanding and does not have any other questions

## 2025-07-22 NOTE — PLAN OF CARE
[place] [facility]  Discharge Final Note    Primary Care Provider: Isela Florian MD    Expected Discharge Date: 7/18/2025    Final Discharge Note (most recent)       Final Note - 07/22/25 1532          Final Note    Assessment Type Final Discharge Note     Anticipated Discharge Disposition Home-Health Care [facility]     What phone number can be called within the next 1-3 days to see how you are doing after discharge? --   [phone]       Post-Acute Status    Post-Acute Authorization Home Health     Home Health Status Set-up Complete/Auth obtained                     Important Message from Medicare           Future Appointments   Date Time Provider Department Center   7/31/2025 11:15 AM Tamir Peterson PA-C [facility] [place]   8/18/2025 10:00 AM Nhi Dang MD [facility] D[facility]   8/25/2025  9:45 AM Jane Johnson PA-C [facility]   8/26/2025  9:15 AM Tamir Peterson PA-C [facility] [place]   10/14/2025  9:00 AM Kar Trevino III, MD [facility] [place]     Patient discharged home to care of [facility] on 7/18/25.    Alysa Hernandez RNCM  Case Management  [facility]  [phone]

## 2025-07-24 ENCOUNTER — TELEPHONE (OUTPATIENT)
Dept: ORTHOPEDICS | Facility: CLINIC | Age: 55
End: 2025-07-24
Payer: COMMERCIAL

## 2025-07-24 DIAGNOSIS — R11.0 NAUSEA: ICD-10-CM

## 2025-07-24 DIAGNOSIS — Z96.641 S/P HIP REPLACEMENT, RIGHT: Primary | ICD-10-CM

## 2025-07-24 RX ORDER — BUTALBITAL, ACETAMINOPHEN AND CAFFEINE 50; 325; 40 MG/1; MG/1; MG/1
TABLET ORAL
Qty: 30 TABLET | Refills: 0 | Status: SHIPPED | OUTPATIENT
Start: 2025-07-24

## 2025-07-24 RX ORDER — ONDANSETRON 8 MG/1
8 TABLET, FILM COATED ORAL EVERY 6 HOURS PRN
Qty: 12 TABLET | Refills: 0 | Status: SHIPPED | OUTPATIENT
Start: 2025-07-24

## 2025-07-30 RX ORDER — ESCITALOPRAM OXALATE 10 MG/1
10 TABLET ORAL
Qty: 90 TABLET | Refills: 2 | Status: SHIPPED | OUTPATIENT
Start: 2025-07-30

## 2025-07-30 NOTE — TELEPHONE ENCOUNTER
No care due was identified.  [facility] Embedded Care Due Messages. Reference number: [id number].   7/30/2025 12:39:56 PM CDT

## 2025-07-31 ENCOUNTER — OFFICE VISIT (OUTPATIENT)
Dept: ORTHOPEDICS | Facility: CLINIC | Age: 55
End: 2025-07-31
Payer: COMMERCIAL

## 2025-07-31 VITALS — HEIGHT: 67 IN | BODY MASS INDEX: 25.96 KG/M2 | WEIGHT: 165.38 LBS

## 2025-07-31 DIAGNOSIS — Z96.641 S/P HIP REPLACEMENT, RIGHT: Primary | ICD-10-CM

## 2025-07-31 PROCEDURE — 99999 PR PBB SHADOW E&M-EST. PATIENT-LVL IV: CPT | Mod: PBBFAC,,,

## 2025-07-31 PROCEDURE — 1159F MED LIST DOCD IN RCRD: CPT | Mod: CPTII,S$GLB,,

## 2025-07-31 PROCEDURE — 1160F RVW MEDS BY RX/DR IN RCRD: CPT | Mod: CPTII,S$GLB,,

## 2025-07-31 PROCEDURE — 99024 POSTOP FOLLOW-UP VISIT: CPT | Mod: S$GLB,,,

## 2025-07-31 PROCEDURE — 3044F HG A1C LEVEL LT 7.0%: CPT | Mod: CPTII,S$GLB,,

## 2025-07-31 NOTE — TELEPHONE ENCOUNTER
Refill Decision Note   Naima Jeffries  is requesting a refill authorization.  Brief Assessment and Rationale for Refill:  Approve     Medication Therapy Plan:         Comments:     Note composed:7:43 PM 07/30/2025

## 2025-07-31 NOTE — PROGRESS NOTES
Naima Jeffries presents for initial post-operative visit following a right total hip arthroplasty performed by Dr. Trevino on 7/17/2025. She had headaches and nausea postoperatively. She was prescribed Fioricet by Mikal which improved her symptoms.    Exam:  There were no vitals taken for this visit.   Patient is ambulating well with cane.   Incision is clean and dry without drainage or erythema.      Initial post-operative radiographs reviewed today revealing satisfactory position of the prosthesis.    A/P  2 weeks s/p right total hip arthroplasty    - Patient advised to keep the incision clean and dry for the next 24 hours after which she  may wash the area with antibacterial soap in the shower, but will not submerge incision until one month post op.  - Home health PT scheduled for discharge today.   - Continue anterior hip precautions x 6 weeks postop.  - Antibiotic prophylaxis reviewed  - Continue ASA 81 mg BID for 1 month post op  - Pain medication: none refilled   - Follow up in 4 weeks with myself. Pt will call clinic immediately with problems/concerns.     Tamir Peterson PA-C

## 2025-08-15 ENCOUNTER — TELEPHONE (OUTPATIENT)
Dept: DERMATOLOGY | Facility: CLINIC | Age: 55
End: 2025-08-15
Payer: COMMERCIAL

## 2025-08-25 ENCOUNTER — OFFICE VISIT (OUTPATIENT)
Dept: OBSTETRICS AND GYNECOLOGY | Facility: CLINIC | Age: 55
End: 2025-08-25
Payer: COMMERCIAL

## 2025-08-25 VITALS — WEIGHT: 166 LBS | BODY MASS INDEX: 26.06 KG/M2 | HEIGHT: 67 IN

## 2025-08-25 DIAGNOSIS — E66.3 OVERWEIGHT: ICD-10-CM

## 2025-08-25 DIAGNOSIS — N95.1 MENOPAUSAL SYMPTOM: Primary | ICD-10-CM

## 2025-08-25 PROCEDURE — 1160F RVW MEDS BY RX/DR IN RCRD: CPT | Mod: CPTII,95,, | Performed by: PHYSICIAN ASSISTANT

## 2025-08-25 PROCEDURE — 3044F HG A1C LEVEL LT 7.0%: CPT | Mod: CPTII,95,, | Performed by: PHYSICIAN ASSISTANT

## 2025-08-25 PROCEDURE — 1159F MED LIST DOCD IN RCRD: CPT | Mod: CPTII,95,, | Performed by: PHYSICIAN ASSISTANT

## 2025-08-25 PROCEDURE — 98005 SYNCH AUDIO-VIDEO EST LOW 20: CPT | Mod: 95,,, | Performed by: PHYSICIAN ASSISTANT

## 2025-08-26 ENCOUNTER — OFFICE VISIT (OUTPATIENT)
Dept: ORTHOPEDICS | Facility: CLINIC | Age: 55
End: 2025-08-26
Payer: COMMERCIAL

## 2025-08-26 ENCOUNTER — HOSPITAL ENCOUNTER (OUTPATIENT)
Dept: RADIOLOGY | Facility: HOSPITAL | Age: 55
Discharge: HOME | End: 2025-08-26
Payer: COMMERCIAL

## 2025-08-26 VITALS — BODY MASS INDEX: 26.47 KG/M2 | WEIGHT: 168.63 LBS | HEIGHT: 67 IN

## 2025-08-26 DIAGNOSIS — Z96.641 S/P HIP REPLACEMENT, RIGHT: ICD-10-CM

## 2025-08-26 DIAGNOSIS — Z96.641 S/P HIP REPLACEMENT, RIGHT: Primary | ICD-10-CM

## 2025-08-26 PROCEDURE — 1160F RVW MEDS BY RX/DR IN RCRD: CPT | Mod: CPTII,S$GLB,,

## 2025-08-26 PROCEDURE — 99024 POSTOP FOLLOW-UP VISIT: CPT | Mod: S$GLB,,,

## 2025-08-26 PROCEDURE — 73521 X-RAY EXAM HIPS BI 2 VIEWS: CPT | Mod: TC

## 2025-08-26 PROCEDURE — 73521 X-RAY EXAM HIPS BI 2 VIEWS: CPT | Mod: 26,,, | Performed by: RADIOLOGY

## 2025-08-26 PROCEDURE — 3044F HG A1C LEVEL LT 7.0%: CPT | Mod: CPTII,S$GLB,,

## 2025-08-26 PROCEDURE — 1159F MED LIST DOCD IN RCRD: CPT | Mod: CPTII,S$GLB,,

## 2025-08-26 PROCEDURE — 99999 PR PBB SHADOW E&M-EST. PATIENT-LVL IV: CPT | Mod: PBBFAC,,,

## 2025-08-31 ENCOUNTER — EXTERNAL HOME HEALTH (OUTPATIENT)
Dept: HOME HEALTH SERVICES | Facility: HOSPITAL | Age: 55
End: 2025-08-31
Payer: COMMERCIAL

## (undated) DEVICE — DRESSING TRANS 4X4 TEGADERM

## (undated) DEVICE — UNDERGLOVES BIOGEL PI SIZE 7.5

## (undated) DEVICE — KIT PT CARE HANA PROFX SSXT

## (undated) DEVICE — SEALER AQUAMANTYS 3.48MM

## (undated) DEVICE — DRESSING TELFA N ADH 3X8

## (undated) DEVICE — ELECTRODE REM PLYHSV RETURN 9

## (undated) DEVICE — SOL NACL IRR 1000ML BTL

## (undated) DEVICE — DRESSING AQUACEL AG RBBN 2X45

## (undated) DEVICE — BLADE SAGITTAL 18 X 1.27 X 90M

## (undated) DEVICE — TAPE SILK 3IN

## (undated) DEVICE — TOWEL OR DISP STRL BLUE 4/PK

## (undated) DEVICE — ALCOHOL 70% ANTISEPTIC ISO 4OZ

## (undated) DEVICE — SOL NACL 0.9% IV INJ 1000ML

## (undated) DEVICE — UNDERGLOVES BIOGEL PI SIZE 8.5

## (undated) DEVICE — HOOD T7 W/ PEEL AWAY LENS

## (undated) DEVICE — SUT 2/0 36IN COATED VICRYL

## (undated) DEVICE — SUT MONOCRYL 3-0 PS-2 UND

## (undated) DEVICE — TOWEL COTTON SURG WHT 27X17IN

## (undated) DEVICE — SUT MONOCRYL 4-0 PS-1 UND

## (undated) DEVICE — DRAPE IOBAN 2 STERI

## (undated) DEVICE — MANIFOLD 4 PORT

## (undated) DEVICE — SYR 50CC LL

## (undated) DEVICE — Device

## (undated) DEVICE — SOL NACL .9% 250ML INJ

## (undated) DEVICE — GLOVE BIOGEL PI MICRO SZ 7

## (undated) DEVICE — NDL HYPO STD REG BVL 18GX1.5IN

## (undated) DEVICE — PACK ECLIPSE UNIVERSAL STERILE

## (undated) DEVICE — SPONGE COTTON TRAY 4X4IN

## (undated) DEVICE — BNDG COFLEX FOAM LF2 ST 6X5YD

## (undated) DEVICE — PENCIL SMK EVAC CONNECTOR 10FT

## (undated) DEVICE — SUT 1 36IN COATED VICRYL UN

## (undated) DEVICE — SOL BETADINE 5%

## (undated) DEVICE — DRAPE C-ARM ELAS CLIP 42X120IN

## (undated) DEVICE — BRUSH SCRUB HIBICLENS 4%

## (undated) DEVICE — PULSAVAC ZIMMER

## (undated) DEVICE — SYS CLSR DERMABOND PRINEO 22CM

## (undated) DEVICE — GLOVE BIOGEL SKINSENSE PI 8.0

## (undated) DEVICE — GOWN SMARTGOWN 3XL XLONG